# Patient Record
Sex: MALE | Race: WHITE | Employment: OTHER | URBAN - METROPOLITAN AREA
[De-identification: names, ages, dates, MRNs, and addresses within clinical notes are randomized per-mention and may not be internally consistent; named-entity substitution may affect disease eponyms.]

---

## 2020-06-25 ENCOUNTER — OFFICE VISIT (OUTPATIENT)
Dept: PRIMARY CARE CLINIC | Age: 80
End: 2020-06-25

## 2020-06-25 PROCEDURE — 99211 OFF/OP EST MAY X REQ PHY/QHP: CPT | Performed by: NURSE PRACTITIONER

## 2020-06-27 LAB
SARS-COV-2: NOT DETECTED
SOURCE: NORMAL

## 2020-06-30 ENCOUNTER — HOSPITAL ENCOUNTER (OUTPATIENT)
Dept: NON INVASIVE DIAGNOSTICS | Age: 80
Discharge: HOME | End: 2020-06-30
Payer: MEDICARE

## 2020-06-30 ENCOUNTER — HOSPITAL ENCOUNTER (OUTPATIENT)
Dept: PULMONOLOGY | Age: 80
Discharge: HOME | End: 2020-06-30
Payer: MEDICARE

## 2020-06-30 VITALS — OXYGEN SATURATION: 96 %

## 2020-06-30 LAB
LV EF: 53 %
LVEF MODALITY: NORMAL

## 2020-06-30 PROCEDURE — 94760 N-INVAS EAR/PLS OXIMETRY 1: CPT

## 2020-06-30 PROCEDURE — 94664 DEMO&/EVAL PT USE INHALER: CPT

## 2020-06-30 PROCEDURE — 94726 PLETHYSMOGRAPHY LUNG VOLUMES: CPT

## 2020-06-30 PROCEDURE — 94010 BREATHING CAPACITY TEST: CPT

## 2020-06-30 PROCEDURE — 93306 TTE W/DOPPLER COMPLETE: CPT

## 2020-06-30 PROCEDURE — 93356 MYOCRD STRAIN IMG SPCKL TRCK: CPT

## 2020-06-30 PROCEDURE — 94729 DIFFUSING CAPACITY: CPT

## 2020-07-01 NOTE — PROCEDURES
Pulmonary Function Test:     Indication: Diffuse large B cell lymphoma     Test comment:     Spirometry data is acceptable and reproducible. Maximum effort given during the test.    Pulse ox is 96% on room air    There is no height or weight on file to calculate BMI. Spirometry data:    FEV1/FVC: 79. Predicted ratio 71    FEV1 2.79L, which is 94% predicted    FVC is 3.51L, which is 84% predicted    Lung Volumes:    TLC (by Plethysmography) is 6.84L, which is 94% predicted    RV is 3.56L which is 131% predicted    Diffusion Capacity:    DLCO is 28.48 which is 92% predicted    Impression:    1. There is no obstruction present    2. There is no restriction present    3. There is no significant hyperinflation. There is air trapping of unclear significance     4.  There is no reduction in diffusion capacity

## 2020-07-13 PROBLEM — C83.30 DIFFUSE LARGE B CELL LYMPHOMA (HCC): Status: ACTIVE | Noted: 2020-07-13

## 2020-07-15 ENCOUNTER — HOSPITAL ENCOUNTER (OUTPATIENT)
Dept: ONCOLOGY | Age: 80
Setting detail: INFUSION SERIES
Discharge: HOME | End: 2020-07-15
Payer: MEDICARE

## 2020-07-15 VITALS
RESPIRATION RATE: 18 BRPM | DIASTOLIC BLOOD PRESSURE: 75 MMHG | OXYGEN SATURATION: 97 % | HEART RATE: 64 BPM | TEMPERATURE: 98 F | SYSTOLIC BLOOD PRESSURE: 128 MMHG

## 2020-07-15 DIAGNOSIS — C83.30 DIFFUSE LARGE B-CELL LYMPHOMA, UNSPECIFIED BODY REGION (HCC): Primary | ICD-10-CM

## 2020-07-15 LAB
A/G RATIO: 1.5 (ref 1.1–2.2)
ALBUMIN SERPL-MCNC: 4.5 G/DL (ref 3.4–5)
ALP BLD-CCNC: 73 U/L (ref 40–129)
ALT SERPL-CCNC: 18 U/L (ref 10–40)
ANION GAP SERPL CALCULATED.3IONS-SCNC: 10 MMOL/L (ref 3–16)
AST SERPL-CCNC: 23 U/L (ref 15–37)
BASOPHILS ABSOLUTE: 0 K/UL (ref 0–0.2)
BASOPHILS ABSOLUTE: 0 K/UL (ref 0–0.2)
BASOPHILS RELATIVE PERCENT: 0.4 %
BASOPHILS RELATIVE PERCENT: 0.6 %
BILIRUB SERPL-MCNC: 0.4 MG/DL (ref 0–1)
BUN BLDV-MCNC: 20 MG/DL (ref 7–20)
CALCIUM SERPL-MCNC: 9.3 MG/DL (ref 8.3–10.6)
CHLORIDE BLD-SCNC: 104 MMOL/L (ref 99–110)
CO2: 27 MMOL/L (ref 21–32)
CREAT SERPL-MCNC: 1.1 MG/DL (ref 0.8–1.3)
EOSINOPHILS ABSOLUTE: 0 K/UL (ref 0–0.6)
EOSINOPHILS ABSOLUTE: 0.1 K/UL (ref 0–0.6)
EOSINOPHILS RELATIVE PERCENT: 0.5 %
EOSINOPHILS RELATIVE PERCENT: 1 %
GFR AFRICAN AMERICAN: >60
GFR NON-AFRICAN AMERICAN: >60
GLOBULIN: 3 G/DL
GLUCOSE BLD-MCNC: 225 MG/DL (ref 70–99)
HCT VFR BLD CALC: 44 % (ref 40.5–52.5)
HCT VFR BLD CALC: 46.4 % (ref 40.5–52.5)
HEMOGLOBIN: 14.6 G/DL (ref 13.5–17.5)
HEMOGLOBIN: 15.5 G/DL (ref 13.5–17.5)
LYMPHOCYTES ABSOLUTE: 1.4 K/UL (ref 1–5.1)
LYMPHOCYTES ABSOLUTE: 1.6 K/UL (ref 1–5.1)
LYMPHOCYTES RELATIVE PERCENT: 23.6 %
LYMPHOCYTES RELATIVE PERCENT: 26.2 %
MAGNESIUM: 2.1 MG/DL (ref 1.8–2.4)
MCH RBC QN AUTO: 32.9 PG (ref 26–34)
MCH RBC QN AUTO: 33.3 PG (ref 26–34)
MCHC RBC AUTO-ENTMCNC: 33.2 G/DL (ref 31–36)
MCHC RBC AUTO-ENTMCNC: 33.5 G/DL (ref 31–36)
MCV RBC AUTO: 98.9 FL (ref 80–100)
MCV RBC AUTO: 99.5 FL (ref 80–100)
MONOCYTES ABSOLUTE: 0.5 K/UL (ref 0–1.3)
MONOCYTES ABSOLUTE: 0.6 K/UL (ref 0–1.3)
MONOCYTES RELATIVE PERCENT: 10.2 %
MONOCYTES RELATIVE PERCENT: 7.7 %
NEUTROPHILS ABSOLUTE: 3.4 K/UL (ref 1.7–7.7)
NEUTROPHILS ABSOLUTE: 4.6 K/UL (ref 1.7–7.7)
NEUTROPHILS RELATIVE PERCENT: 62.2 %
NEUTROPHILS RELATIVE PERCENT: 67.6 %
PDW BLD-RTO: 13.4 % (ref 12.4–15.4)
PDW BLD-RTO: 13.6 % (ref 12.4–15.4)
PLATELET # BLD: 139 K/UL (ref 135–450)
PLATELET # BLD: 162 K/UL (ref 135–450)
PMV BLD AUTO: 8.8 FL (ref 5–10.5)
PMV BLD AUTO: 9.5 FL (ref 5–10.5)
POTASSIUM SERPL-SCNC: 4.8 MMOL/L (ref 3.5–5.1)
RBC # BLD: 4.44 M/UL (ref 4.2–5.9)
RBC # BLD: 4.66 M/UL (ref 4.2–5.9)
SODIUM BLD-SCNC: 141 MMOL/L (ref 136–145)
TOTAL PROTEIN: 7.5 G/DL (ref 6.4–8.2)
WBC # BLD: 5.4 K/UL (ref 4–11)
WBC # BLD: 6.9 K/UL (ref 4–11)

## 2020-07-15 PROCEDURE — 2580000003 HC RX 258: Performed by: INTERNAL MEDICINE

## 2020-07-15 PROCEDURE — 85025 COMPLETE CBC W/AUTO DIFF WBC: CPT

## 2020-07-15 PROCEDURE — 96366 THER/PROPH/DIAG IV INF ADDON: CPT

## 2020-07-15 PROCEDURE — 6360000002 HC RX W HCPCS: Performed by: INTERNAL MEDICINE

## 2020-07-15 PROCEDURE — 99213 OFFICE O/P EST LOW 20 MIN: CPT

## 2020-07-15 PROCEDURE — 80053 COMPREHEN METABOLIC PANEL: CPT

## 2020-07-15 PROCEDURE — 83735 ASSAY OF MAGNESIUM: CPT

## 2020-07-15 PROCEDURE — 96365 THER/PROPH/DIAG IV INF INIT: CPT

## 2020-07-15 RX ORDER — ASCORBIC ACID 500 MG
500 TABLET ORAL DAILY
COMMUNITY

## 2020-07-15 RX ORDER — M-VIT,TX,IRON,MINS/CALC/FOLIC 27MG-0.4MG
1 TABLET ORAL DAILY
COMMUNITY

## 2020-07-15 RX ADMIN — CALCIUM GLUCONATE 4 G: 98 INJECTION, SOLUTION INTRAVENOUS at 08:17

## 2020-07-15 NOTE — PROGRESS NOTES
Autologous stem cell collection  7/15/2020     Asaf Sanchez    MRN: [id number]    : 1940    Referring MD: Burt Hayes MD  [address] [facility] [address], [address], [address]    S:  SUBJECTIVE:  Patient is doing welI. Dx: recurrent DLBCL    Indication: anticipation T-cell collection for Derian trial CAR-T infusion     Day + 1 of T-cell collection      Medications    Scheduled Meds:   calcium gluconate IVPB  4 g Intravenous Once     Continuous Infusions:  PRN Meds:. ROS  · Constitutional: Denies fever, sweats, weight loss. Minor [place]  · Eyes: No visual changes or diplopia. No scleral icterus. · ENT: No Headaches, hearing loss or vertigo. No mouth sores or sore throat. · Cardiovascular: No chest pain, dyspnea on exertion, palpitations or loss of consciousness. · Respiratory: No cough or wheezing, no sputum production. No hemoptysis. .    · Gastrointestinal: No abdominal pain, appetite loss, blood in stools. No change in bowel habits. · Genitourinary: No dysuria, trouble voiding, or hematuria. · Musculoskeletal:  Generalized weakness. No joint complaints. · Integumentary: No rash or pruritis. · Neurological: No headache, diplopia. No change in gait, balance, or coordination. No paresthesias. · Endocrine: No temperature intolerance. No excessive thirst, fluid intake, or urination. · Hematologic/Lymphatic: No abnormal bruising or ecchymoses, blood clots or swollen lymph nodes. · Allergic/Immunologic: No nasal congestion or hives.      O:   Physical Exam:     I&O:  No intake or output data in the 24 hours ending 07/15/20 0946    Vital Signs:  /75   Pulse 77   Temp 98.1 °F (36.7 °C) (Oral)   Resp 16   SpO2 97%     Weight:    Wt Readings from Last 3 Encounters:   No data found for Wt         General: Awake, alert and oriented.   HEENT: normocephalic, PERRL, no scleral erythema or icterus, Oral mucosa moist and intact, throat clear  NECK: supple without palpable adenopathy  BACK: Straight negative CVAT  SKIN: warm dry and intact without lesions rashes or masses  CHEST: CTA bilaterally without use of accessory muscles  CV: Normal S1 S2, RRR, no MRG  ABD: NT ND normoactive BS, no palpable masses or hepatosplenomegaly  EXTREMITIES: without edema, denies calf tenderness  NEURO: CN II - XII grossly intact  CATHETER: none    Data    CBC:   Recent Labs     07/15/20  0815   WBC 5.4   HGB 15.5   HCT 46.4   MCV 99.5        BMP/Mag:  Recent Labs     07/15/20  0815      K 4.8      CO2 27   BUN 20   CREATININE 1.1   MG 2.10     LIVP:   Recent Labs     07/15/20  0815   AST 23   ALT 18   BILITOT 0.4   ALKPHOS 73     Coags: No results for input(s): PROTIME, INR, APTT in the last 72 hours. Uric Acid No results for input(s): LABURIC in the last 72 hours. Description of procedure:   Pt underwent a 20lt collection at a flow rate of 20mls/min. Pt tolerated the procedure well with no complications. He received Ca gluconate empirically. Access was peripheral IV which is intact at the insertion site. A/P:    DLBCL: Collect lymphocytes for CAR-T 7/15/20.  He  tolerated the lymphocyte collection well today.     - PET week of August 2nd, 2020, prior to lympodepleting chemotherapy     Toxicities: mild weakness and fatigue       Chrissie Schmidt, APRN - NP

## 2020-07-15 NOTE — PROGRESS NOTES
Patient had to come back to outpatient infusion for a CBC w Diff. His post cbc w diff was sent per [facility] RN but lab never received it. Patient was upset that he had to come back. RN verbalized understanding and the importance of that particular lab. Patient and wife discharged ambulatory.

## 2020-07-15 NOTE — PLAN OF CARE
Problem: KNOWLEDGE DEFICIT  Goal: Patient/S.O. demonstrates understanding of disease process, treatment plan, medications, and discharge instructions. Outcome: Met This Shift   Pt arrived to [facility] today for scheduled T cell pheresis procedure. Apheresis, line care, education, & lab draws all completed by Karlee RUGGIERO, Shannon Stallworth. Review aKrlee documentation for details.

## 2020-08-04 ENCOUNTER — HOSPITAL ENCOUNTER (OUTPATIENT)
Dept: CT IMAGING | Age: 80
Discharge: HOME | End: 2020-08-04
Payer: MEDICARE

## 2020-08-04 LAB
GFR AFRICAN AMERICAN: >60
GFR NON-AFRICAN AMERICAN: >60
PERFORMED ON: NORMAL
POC CREATININE: 1.1 MG/DL (ref 0.8–1.3)
POC SAMPLE TYPE: NORMAL

## 2020-08-04 PROCEDURE — 6360000004 HC RX CONTRAST MEDICATION: Performed by: INTERNAL MEDICINE

## 2020-08-04 PROCEDURE — 82565 ASSAY OF CREATININE: CPT

## 2020-08-04 PROCEDURE — 70491 CT SOFT TISSUE NECK W/DYE: CPT

## 2020-08-04 PROCEDURE — 71260 CT THORAX DX C+: CPT

## 2020-08-04 RX ADMIN — IOPAMIDOL 80 ML: 755 INJECTION, SOLUTION INTRAVENOUS at 09:04

## 2020-08-04 RX ADMIN — IOHEXOL 50 ML: 240 INJECTION, SOLUTION INTRATHECAL; INTRAVASCULAR; INTRAVENOUS; ORAL at 09:05

## 2020-08-18 ENCOUNTER — HOSPITAL ENCOUNTER (INPATIENT)
Age: 80
LOS: 7 days | Discharge: HOME | DRG: 552 | End: 2020-08-25
Attending: INTERNAL MEDICINE | Admitting: INTERNAL MEDICINE
Payer: MEDICARE

## 2020-08-18 ENCOUNTER — APPOINTMENT (OUTPATIENT)
Dept: GENERAL RADIOLOGY | Age: 80
DRG: 552 | End: 2020-08-18
Attending: INTERNAL MEDICINE
Payer: MEDICARE

## 2020-08-18 PROBLEM — R29.90 NEUROTOXICITY: Status: ACTIVE | Noted: 2020-08-18

## 2020-08-18 LAB
ALBUMIN SERPL-MCNC: 4.1 G/DL (ref 3.4–5)
ANION GAP SERPL CALCULATED.3IONS-SCNC: 11 MMOL/L (ref 3–16)
BILIRUBIN URINE: NEGATIVE
BLOOD, URINE: NEGATIVE
BUN BLDV-MCNC: 19 MG/DL (ref 7–20)
CALCIUM SERPL-MCNC: 8.7 MG/DL (ref 8.3–10.6)
CHLORIDE BLD-SCNC: 98 MMOL/L (ref 99–110)
CLARITY: CLEAR
CO2: 25 MMOL/L (ref 21–32)
COLOR: YELLOW
CREAT SERPL-MCNC: 1 MG/DL (ref 0.8–1.3)
GFR AFRICAN AMERICAN: >60
GFR NON-AFRICAN AMERICAN: >60
GLUCOSE BLD-MCNC: 229 MG/DL (ref 70–99)
GLUCOSE URINE: NEGATIVE MG/DL
KETONES, URINE: ABNORMAL MG/DL
LACTATE DEHYDROGENASE: 185 U/L (ref 100–190)
LACTIC ACID: 1.8 MMOL/L (ref 0.4–2)
LEUKOCYTE ESTERASE, URINE: NEGATIVE
MICROSCOPIC EXAMINATION: ABNORMAL
NITRITE, URINE: NEGATIVE
PH UA: 6 (ref 5–8)
PHOSPHORUS: 2.5 MG/DL (ref 2.5–4.9)
PHOSPHORUS: 2.8 MG/DL (ref 2.5–4.9)
POTASSIUM SERPL-SCNC: 4.1 MMOL/L (ref 3.5–5.1)
PROTEIN UA: NEGATIVE MG/DL
SODIUM BLD-SCNC: 134 MMOL/L (ref 136–145)
SPECIFIC GRAVITY UA: 1.02 (ref 1–1.03)
URIC ACID, SERUM: 4.6 MG/DL (ref 3.5–7.2)
URINE TYPE: ABNORMAL
UROBILINOGEN, URINE: 0.2 E.U./DL

## 2020-08-18 PROCEDURE — 87103 BLOOD FUNGUS CULTURE: CPT

## 2020-08-18 PROCEDURE — 87040 BLOOD CULTURE FOR BACTERIA: CPT

## 2020-08-18 PROCEDURE — 80069 RENAL FUNCTION PANEL: CPT

## 2020-08-18 PROCEDURE — 6360000002 HC RX W HCPCS: Performed by: INTERNAL MEDICINE

## 2020-08-18 PROCEDURE — 6370000000 HC RX 637 (ALT 250 FOR IP): Performed by: INTERNAL MEDICINE

## 2020-08-18 PROCEDURE — 83605 ASSAY OF LACTIC ACID: CPT

## 2020-08-18 PROCEDURE — 36591 DRAW BLOOD OFF VENOUS DEVICE: CPT

## 2020-08-18 PROCEDURE — 6370000000 HC RX 637 (ALT 250 FOR IP): Performed by: PHYSICIAN ASSISTANT

## 2020-08-18 PROCEDURE — 2580000003 HC RX 258: Performed by: INTERNAL MEDICINE

## 2020-08-18 PROCEDURE — 84100 ASSAY OF PHOSPHORUS: CPT

## 2020-08-18 PROCEDURE — 84550 ASSAY OF BLOOD/URIC ACID: CPT

## 2020-08-18 PROCEDURE — 94150 VITAL CAPACITY TEST: CPT

## 2020-08-18 PROCEDURE — 81003 URINALYSIS AUTO W/O SCOPE: CPT

## 2020-08-18 PROCEDURE — 71046 X-RAY EXAM CHEST 2 VIEWS: CPT

## 2020-08-18 PROCEDURE — 93005 ELECTROCARDIOGRAM TRACING: CPT | Performed by: PHYSICIAN ASSISTANT

## 2020-08-18 PROCEDURE — 83615 LACTATE (LD) (LDH) ENZYME: CPT

## 2020-08-18 PROCEDURE — 2060000000 HC ICU INTERMEDIATE R&B

## 2020-08-18 PROCEDURE — 51798 US URINE CAPACITY MEASURE: CPT

## 2020-08-18 RX ORDER — ACETAMINOPHEN 325 MG/1
650 TABLET ORAL EVERY 4 HOURS PRN
Status: DISCONTINUED | OUTPATIENT
Start: 2020-08-18 | End: 2020-08-25 | Stop reason: HOSPADM

## 2020-08-18 RX ORDER — LEVETIRACETAM 500 MG/1
500 TABLET ORAL 2 TIMES DAILY
Status: DISCONTINUED | OUTPATIENT
Start: 2020-08-18 | End: 2020-08-25 | Stop reason: HOSPADM

## 2020-08-18 RX ORDER — ONDANSETRON HYDROCHLORIDE 8 MG/1
8 TABLET, FILM COATED ORAL EVERY 8 HOURS PRN
Status: DISCONTINUED | OUTPATIENT
Start: 2020-08-18 | End: 2020-08-25 | Stop reason: HOSPADM

## 2020-08-18 RX ORDER — ONDANSETRON HYDROCHLORIDE 8 MG/1
8 TABLET, FILM COATED ORAL EVERY 8 HOURS PRN
COMMUNITY
End: 2021-05-06

## 2020-08-18 RX ORDER — SODIUM CHLORIDE 0.9 % (FLUSH) 0.9 %
10 SYRINGE (ML) INJECTION EVERY 12 HOURS SCHEDULED
Status: DISCONTINUED | OUTPATIENT
Start: 2020-08-18 | End: 2020-08-25 | Stop reason: HOSPADM

## 2020-08-18 RX ORDER — ASCORBIC ACID 500 MG
500 TABLET ORAL DAILY
Status: DISCONTINUED | OUTPATIENT
Start: 2020-08-18 | End: 2020-08-25 | Stop reason: HOSPADM

## 2020-08-18 RX ORDER — SODIUM CHLORIDE 0.9 % (FLUSH) 0.9 %
10 SYRINGE (ML) INJECTION PRN
Status: DISCONTINUED | OUTPATIENT
Start: 2020-08-18 | End: 2020-08-25 | Stop reason: HOSPADM

## 2020-08-18 RX ORDER — POTASSIUM CHLORIDE 29.8 MG/ML
20 INJECTION INTRAVENOUS CONTINUOUS PRN
Status: DISCONTINUED | OUTPATIENT
Start: 2020-08-18 | End: 2020-08-25 | Stop reason: HOSPADM

## 2020-08-18 RX ORDER — M-VIT,TX,IRON,MINS/CALC/FOLIC 27MG-0.4MG
1 TABLET ORAL DAILY
Status: DISCONTINUED | OUTPATIENT
Start: 2020-08-18 | End: 2020-08-25 | Stop reason: HOSPADM

## 2020-08-18 RX ORDER — LEVETIRACETAM 500 MG/1
500 TABLET ORAL 2 TIMES DAILY
COMMUNITY
End: 2021-05-06

## 2020-08-18 RX ORDER — SODIUM CHLORIDE 9 MG/ML
500 INJECTION, SOLUTION INTRAVENOUS CONTINUOUS PRN
Status: DISCONTINUED | OUTPATIENT
Start: 2020-08-18 | End: 2020-08-25 | Stop reason: HOSPADM

## 2020-08-18 RX ORDER — TRAMADOL HYDROCHLORIDE 50 MG/1
50 TABLET ORAL EVERY 6 HOURS PRN
Status: DISCONTINUED | OUTPATIENT
Start: 2020-08-18 | End: 2020-08-25 | Stop reason: HOSPADM

## 2020-08-18 RX ORDER — MAGNESIUM SULFATE IN WATER 40 MG/ML
4 INJECTION, SOLUTION INTRAVENOUS PRN
Status: DISCONTINUED | OUTPATIENT
Start: 2020-08-18 | End: 2020-08-25 | Stop reason: HOSPADM

## 2020-08-18 RX ADMIN — PIPERACILLIN SODIUM AND TAZOBACTAM SODIUM 4.5 G: 4; .5 INJECTION, POWDER, LYOPHILIZED, FOR SOLUTION INTRAVENOUS at 21:03

## 2020-08-18 RX ADMIN — MULTIPLE VITAMINS W/ MINERALS TAB 1 TABLET: TAB at 18:05

## 2020-08-18 RX ADMIN — LEVETIRACETAM 500 MG: 500 TABLET, FILM COATED ORAL at 21:02

## 2020-08-18 RX ADMIN — Medication 500 MG: at 18:50

## 2020-08-18 RX ADMIN — TRAMADOL HYDROCHLORIDE 50 MG: 50 TABLET, FILM COATED ORAL at 23:45

## 2020-08-18 RX ADMIN — SODIUM CHLORIDE 15 ML: 900 IRRIGANT IRRIGATION at 17:03

## 2020-08-18 RX ADMIN — ACETAMINOPHEN 650 MG: 325 TABLET ORAL at 21:02

## 2020-08-18 ASSESSMENT — PAIN - FUNCTIONAL ASSESSMENT
PAIN_FUNCTIONAL_ASSESSMENT: PREVENTS OR INTERFERES SOME ACTIVE ACTIVITIES AND ADLS
PAIN_FUNCTIONAL_ASSESSMENT: PREVENTS OR INTERFERES SOME ACTIVE ACTIVITIES AND ADLS

## 2020-08-18 ASSESSMENT — PAIN DESCRIPTION - DESCRIPTORS
DESCRIPTORS: CRAMPING;DISCOMFORT
DESCRIPTORS: CRAMPING;DISCOMFORT

## 2020-08-18 ASSESSMENT — PAIN DESCRIPTION - LOCATION
LOCATION: LEG;HIP
LOCATION: LEG;HIP

## 2020-08-18 ASSESSMENT — PAIN DESCRIPTION - ONSET
ONSET: ON-GOING
ONSET: ON-GOING

## 2020-08-18 ASSESSMENT — PAIN SCALES - GENERAL
PAINLEVEL_OUTOF10: 4
PAINLEVEL_OUTOF10: 0
PAINLEVEL_OUTOF10: 3

## 2020-08-18 ASSESSMENT — PAIN DESCRIPTION - ORIENTATION
ORIENTATION: RIGHT;LEFT
ORIENTATION: RIGHT;LEFT

## 2020-08-18 ASSESSMENT — PAIN DESCRIPTION - PAIN TYPE
TYPE: ACUTE PAIN
TYPE: ACUTE PAIN

## 2020-08-18 ASSESSMENT — PAIN DESCRIPTION - FREQUENCY
FREQUENCY: CONTINUOUS
FREQUENCY: CONTINUOUS

## 2020-08-18 ASSESSMENT — PAIN DESCRIPTION - PROGRESSION
CLINICAL_PROGRESSION: NOT CHANGED
CLINICAL_PROGRESSION: NOT CHANGED

## 2020-08-18 NOTE — PROGRESS NOTES
4 Eyes Admission Assessment     I agree as the admission nurse that 2 RN's have performed a thorough Head to Toe Skin Assessment on the patient. ALL assessment sites listed below have been assessed on admission. Areas assessed by both nurses: theodore and Anika   [x]   Head, Face, and Ears   [x]   Shoulders, Back, and Chest  [x]   Arms, Elbows, and Hands   [x]   Coccyx, Sacrum, and Ischum  [x]   Legs, Feet, and Heels        Does the Patient have Skin Breakdown?   No         Sushant Prevention initiated:  No   Wound Care Orders initiated:  No      [facility] nurse consulted for Pressure Injury (Stage 3,4, Unstageable, DTI, NWPT, and Complex wounds) or Sushant score 18 or lower:  No      Nurse 1 eSignature: Electronically signed by Kimberly Damon RN on 8/18/20 at 4:15 PM EDT    **SHARE this note so that the co-signing nurse is able to place an eSignature**    Nurse 2 eSignature: Electronically signed by Aniyah Castillo RN on 8/18/20 at 7:28 PM EDT

## 2020-08-18 NOTE — PROGRESS NOTES
Patient admitted to [facility] via wheelchair from [facility]. Patient and wife oriented to patient room including call light and bed controls. Admission assessment completed - see admission flowsheet documentation. Patient is high fall risk. Safety measures instituted per policy. Patient and wife oriented to unit policies and procedures including: pain management practices, unit safety precautions, family rapid response, q4h vital signs and assessments, daily 4am lab draws, weekly chest x-rays, weekly VRE rectal swabs for surveillance, daily chlorhexidine bathing, standing transfusion orders, and routine central line care. Also discussed use of call light and how to get in touch with nursing staff. Stressed the importance of calling out immediately for any changes in condition including but not limited to: pain, chills, fever, nausea, vomiting, diarrhea, chest pain, sob/olsen, assistance with toileting, bleeding, or any other symptoms that are out of the ordinary for the patient. Patient verbalizes understanding of all instructions and will call for assistance as needed.

## 2020-08-18 NOTE — H&P
[facility] History and Physical        Attending Physician: Wagner Allison MD    Primary Care: Sweta Martins MD       Referring MD: Wagner Allison MD  [address] [facility] [address]  [place], [address]    Name: Grady Johnson :  1940  MRN:  [id number]    Admission: (Not on file)      Date: 2020    Reason for Admission:  Neurotoxicity     History of Present Illness:     Mr. 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 With retroperitoneal lymph node biopsy showed myc partially positive; BCL-2 strongly positive. A high-67 proliferation index was 90%. 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 He was treated with R-CHOP x6 (1/10/2019- 2019). His PET scan after 4 cycles showed complete response. A follow-up PET scan (2020) showed 2 new zones of elevated uptake involving the right inguinal node and soft tissue thickening in the right retroperitoneum. He underwent a repeat biopsy of the inguinal lymph node. This again documented diffuse large cell B-cell non-Hodgkin's lymphoma germinal center type. There was translocation of chromosome 14; 18. He was then consented for [address]. He underwent lymphodepleting chemotherapy with Fludarabine and cyclophosphamide on 8/10-20 and had his CAR-T infusion on 20. He was returning daily for close follow up. He presented to [facility] in the morning on 20 in his usual state of health. He had walked in and out of the office without issue. He then went home and was eating lunch in a chair when he had sudden onset numbness in bilateral lower extremities. He was able to ambulate into the car but reported increased weakness. He denies loss of bowel or bladder function but states that he does not feel that he has to urinate. He denies fevers, chills, confusion, back pain. Given his sudden onset neurologic changes, he will be admitted for further work up and close monitoring. Neurology will be consulted on admission. He will also have MRIs of the lumbar spine ordered, as well as an LP after washout of his anticoagulant to further evaluate. No past surgical history on file. No past medical history on file. Prior to Admission medications    Medication Sig Start Date End Date Taking? Authorizing Provider   apixaban (ELIQUIS) 2.5 MG TABS tablet Take 2.5 mg by mouth 2 times daily    Historical Provider, MD   Multiple Vitamins-Minerals (THERAPEUTIC MULTIVITAMIN-MINERALS) tablet Take 1 tablet by mouth daily    Historical Provider, MD   vitamin C (ASCORBIC ACID) 500 MG tablet Take 500 mg by mouth daily    Historical Provider, MD       No Known Allergies    No family history on file.      Social History     Socioeconomic History    Marital status:      Spouse name: Not on file    Number of children: Not on file    Years of education: Not on file    Highest education level: Not on file   Occupational History    Not on file   Social Needs    Financial resource strain: Not on file    Food insecurity     Worry: Not on file     Inability: Not on file    Transportation needs     Medical: Not on file     Non-medical: Not on file   Tobacco Use    Smoking status: Not on file   Substance and Sexual Activity    Alcohol use: Not on file    Drug use: Not on file    Sexual activity: Not on file   Lifestyle    Physical activity     Days per week: Not on file     Minutes per session: Not on file    Stress: Not on file   Relationships    Social connections     Talks on phone: Not on file     Gets together: Not on file     Attends Congregation service: Not on file     Active member of club or organization: Not on file     Attends meetings of clubs or organizations: Not on file     Relationship status: Not on file    Intimate partner violence     Fear of current or ex partner: Not on file     Emotionally abused: Not on file     Physically abused: Not on file     Forced sexual activity: Not on file   Other Topics Concern    Not on file   Social History Narrative    Not on file        ROS:  As noted above, otherwise remainder of 10-point ROS negative      Physical Exam:     Vital Signs: There were no vitals taken for this visit. Weight:    Wt Readings from Last 3 Encounters:   No data found for Wt       KPS: 50%  Requires considerable assistance and frequent medical care    ECOG PS:  (3) Capable of limited self-care, confined to bed or chair > 50% of waking hours    General: Awake, alert and oriented. HEENT: normocephalic, alopecia, PERRL, no scleral erythema or icterus, Oral mucosa moist and intact, throat clear  NECK: supple without palpable adenopathy  BACK: Straight negative CVAT  SKIN: warm dry and intact without lesions rashes or masses  CHEST: CTA bilaterally without use of accessory muscles  CV: Normal S1 S2, RRR, no MRG  ABD: NT ND normoactive BS, no palpable masses or hepatosplenomegaly  EXTREMITIES: without edema, denies calf tenderness  NEURO: CN II - XII grossly intact; strength 4/5 in BLE except 5/5 plantar and dorsiflexioin at the ankles, sensation in the LE intact, finger to dose intact as well as upper body strength. Walks with unsteady wide based gait which is new. CATHETER: Right chest port     Laboratory Data:  CBC: No results for input(s): WBC, HGB, HCT, MCV, PLT in the last 72 hours. BMP/Mag:No results for input(s): NA, K, CL, CO2, PHOS, BUN, CREATININE, MG in the last 72 hours. Invalid input(s): CA  LIVP: No results for input(s): AST, ALT, LIPASE, BILIDIR, BILITOT, ALKPHOS in the last 72 hours. Invalid input(s): AMYLASE,  ALB  Coags: No results for input(s): PROTIME, INR, APTT in the last 72 hours. Uric Acid No results for input(s): LABURIC in the last 72 hours.   No results found for: CRP  No results found for: FERRITIN    PROBLEM LIST:             1. Diffuse Large B-Cell Lymphoma  2. History of Pulmonary Embolism  3. Neurotoxicity       TREATMENT:             1. R-CHOP x 6 cycles (1/10/2019-4/24/2019)  2. USOR 57478 - lymphodepleting chemotherapy:  Fludarabine and cyclophosphamide (8/10/2020-8/12/2020) f/b CAR-T 8/14/2020      ASSESSMENT AND PLAN:          1. Recurrent NHL  - PET/CT (6/30/20): right paraortic mass, SUV 5.2.   - Plan: lympho-depleting chemo (Fludarabine/Cyclophosphamide, 8/10-8/12/20) followed by CAR-T infusion (8/14/20) per clinical trial.      Day +4 of CAR-T infusion     Ferritin 210  C-RP 0.9    Lymphodepleting Chemotherapy: Fludarabine and cyclophosphamide  Disease Status at time of Infusion: Relapsed  CAR-T Infusion Date: 8/14/20  CAR-T Product:  Karin Handler ([id number])    2. CRS/Neuro:   - Cont Keppra 500mg BID (start 8/10/20)   - Consult neurology on admission  - MRI L/S spine (8/18/20): pending  - LP (8/18/20): ordered, will need to be delayed until 8/20/20 d/t taking Eliquis 8/18/20    Toxicity Grading   CRS stgstrstastdstest:st st1st ICANs thgthrthathdtheth:th th5th ICE score: 10      3. ID: Afebrile    - Start prophylaxis antimicrobials when ANC <1.5.     4. Heme: anemia from chemotherapy  - Transfuse for Hgb < 7 and Platelets < 28K  - No transfusion today     5. Metabolic: Electrolytes & renal fxn stable  - Encourage PO fluid intake    6. GI / Nutrition: Appetite and oral intake is good  - Cont low microbial diet  - Zofran and Compazine as needed     7. Hx of PE  - Lower dose to 2.5mg BID and hold if platelets < 94L -- HOLD in anticipation of LP      - DVT Prophylaxis: Prophylaxis on hold d/t contraindication  Contraindications to pharmacologic prophylaxis: Patient already on therapeutic anticoagulation  Contraindications to mechanical prophylaxis: None    - Disposition: Once resolution in neurologic symptoms    The patient was seen and examined by Dr. Poli Nichole. This admission history and physical has been discussed and agreed upon by Dr. Poli Nichole.     Teresa Urbina DEX Mcclodu MD  [facility]  Please contact me through [address]

## 2020-08-18 NOTE — CONSULTS
Neurology Consult Note    Reason for Consult: numbness     Chief complaint: numbness     Dr Maynor Hickman MD asked me to see Tasneem Morocho in consultation for evaluation of numbness. History of Present Illness:  Tasneem Morocho is a [de-identified] y.o. male who presents with numbness  Location: both legs  Quality: numb and tingling  Onset: abruptly  Course: improved by about 50%  Frequency: once  Duration: about 4 hours  Modifying factors: none, but symptoms have been improving on their own since onset  Associated symptoms: initially perceived weakness in both legs, now strength reported as normal.    Sneha Caballero was having lunch and abruptly after lunch developed weakness and reduced sensation and paresthesias in both legs. He admits to reduced urinary urgency since chemotherapy began weeks ago, and denies any abrupt change in this today. He admits to chronic gradient stocking reduction in sensation in both feet up to the ankles bilaterally for \"years. \" He denies any headaches, neck, or back pain. He denies any change in vision or hearing. He denies any dysphagia. He denies urinary or bowel incontinence. Medical History:  No past medical history on file. No past surgical history on file. Medications Prior to Admission: levETIRAcetam (KEPPRA) 500 MG tablet, Take 500 mg by mouth 2 times daily  ondansetron (ZOFRAN) 8 MG tablet, Take 8 mg by mouth every 8 hours as needed for Nausea or Vomiting  apixaban (ELIQUIS) 2.5 MG TABS tablet, Take 2.5 mg by mouth 2 times daily  Multiple Vitamins-Minerals (THERAPEUTIC MULTIVITAMIN-MINERALS) tablet, Take 1 tablet by mouth daily  vitamin C (ASCORBIC ACID) 500 MG tablet, Take 500 mg by mouth daily  No Known Allergies  No family history on file.   Social History     Tobacco Use   Smoking Status Not on file     Social History     Substance and Sexual Activity   Drug Use Not on file     Social History     Substance and Sexual Activity   Alcohol Use Not on file ROS:  Constitutional- No weight loss. No fevers. Eyes- No diplopia. No photophobia. Ears/nose/throat- No dysphagia. No Dysarthria  Cardiovascular- No palpitations. No chest pain  Respiratory- No dyspnea. No Cough  Gastrointestinal- No Abdominal pain. No Vomiting. Genitourinary- No incontinence. No urinary retention  Musculoskeletal- No myalgia. No arthralgia  Skin- No rash. No easy bruising. Psychiatric- No depression. No anxiety  Endocrine- No diabetes. No thyroid issues. Hematologic- No bleeding difficulty. No fatigue  Neurologic- +weakness. No Headache. Exam:  Vitals:    08/18/20 1512   BP: 132/80   Pulse: 79   Resp: 18   SpO2: 97%     Constitutional   Vital signs: BP, HR, and RR reviewed   General Alert, no distress, well-nourished  Eyes: fundoscopic exam unable to visualize fundi  Cardiovascular: pulses symmetric in all 4 extremities. No peripheral edema. Psychiatric: cooperative with examination, no psychotic behavior noted. Neurologic  Mental status:  orientation to person and place  General fund of knowledge grossly intact  Memory grossly intact  Attention intact as able to attend well to the exam   Language fluent in conversation, no dysarthria  Comprehension intact; follows simple commands     Cranial nerves:  CN2: Visual Fields full w/o extinction on confrontational testing  CN 3,4,6: extraocular muscles intact  CN5: facial sensation symmetric  CN7:face symmetric  CN8: hearing grossly intact  CN9: palate elevated symmetrically  CN11: trap full strength on shoulder shrug  CN12: tongue midline with protrusion     Strength: No prontator drift. 5/5 strength in bilateral upper and lower extremities. Deep tendon reflexes: 2/4 in all 4 extremities     Sensory: light touch is reduced in bilateral lower extremities, intact in U.E. bilat. Cerebellar/coordination: finger nose finger normal without ataxia.     Tone: normal in all 4 extremities     Gait: gait was deferred for safety    I reviewed the following laboratory and imaging study reports, and I independently reviewed the following imaging studies. Labs  No results found for this or any previous visit (from the past 36 hour(s)). Studies:  XR CHEST (2 VW)    (Results Pending)   MRI LUMBAR SPINE W WO CONTRAST    (Results Pending)   MRI SACRUM COCCYX W WO CONTRAST    (Results Pending)   FL LUMBAR PUNCTURE DIAG    (Results Pending)       Impression:  1. Lower extremity numbness  2. Paresthesias  3. Lower extremity weakness  4. Reduced urinary urgency  5. Cauda equina syndrome  6. Myelo-radiculoneuropathy, possible  7. Non-hodgkin's lymphoma  8. Neurotoxicity, possible with CAR-T therapy  9. History of bilateral femoral DVT    Loki Linder is a [de-identified] y.o. male who presented with acute onset bilateral lower extremity numbness and paresthesias slowly improving, and with perceived weakness (now resolved) and chronically reduced urinary urgency (since onset of CAR-T therapy) collectively concerning for neurologic or vascular etiology. Symptoms concerning for Cauda equina syndrome, however neurotoxicity from CAR-T well within differential, as well as worsening of known bilateral femoral DVT a consideration. Recommendation:  1. Obtain MRI T/L spine w/wo to evaluate for myelopathy or cauda equina compression; we discussed risks and benefits of dona usage. 2. If any lesion found on MRI imaging, recommend team obtain stat neurosurgery consultation. 3. Obtain vascular medicine/surgery consultation given history of femoral DVTs. 4. Obtain inflammatory workup including TSH, vitamin B12, thiamine, folate, methylmalonic acid, VDRL, HIV, and serum paraneoplastic ([facility]) panel. 5. If above workup normal would advise EMG of both legs when able. 6. The patient should follow up with Neurology as an outpatient after discharge. Your medical management. Thank you for allowing my participation in Mike's care. Please call if any questions or concerns. Ilsa Mariano D.O. [address] [place] [address] [address] Neuroscience  Ph: [phone]    Total face to face time spent performing history, physical examination, and counseling was at least 80 minutes with 50% of time spent counseling the patient and family.     A copy of this note was provided for the attending: Megan Mhoan MD .

## 2020-08-19 ENCOUNTER — APPOINTMENT (OUTPATIENT)
Dept: MRI IMAGING | Age: 80
DRG: 552 | End: 2020-08-19
Attending: INTERNAL MEDICINE
Payer: MEDICARE

## 2020-08-19 ENCOUNTER — APPOINTMENT (OUTPATIENT)
Dept: GENERAL RADIOLOGY | Age: 80
DRG: 552 | End: 2020-08-19
Attending: INTERNAL MEDICINE
Payer: MEDICARE

## 2020-08-19 LAB
ALBUMIN SERPL-MCNC: 4.1 G/DL (ref 3.4–5)
ALP BLD-CCNC: 72 U/L (ref 40–129)
ALT SERPL-CCNC: 19 U/L (ref 10–40)
ANION GAP SERPL CALCULATED.3IONS-SCNC: 12 MMOL/L (ref 3–16)
ANISOCYTOSIS: ABNORMAL
APTT: 30.4 SEC (ref 24.2–36.2)
AST SERPL-CCNC: 20 U/L (ref 15–37)
BACTERIA: ABNORMAL /HPF
BASOPHILS ABSOLUTE: 0 K/UL (ref 0–0.2)
BASOPHILS RELATIVE PERCENT: 0 %
BILIRUB SERPL-MCNC: 0.6 MG/DL (ref 0–1)
BILIRUBIN DIRECT: <0.2 MG/DL (ref 0–0.3)
BILIRUBIN URINE: NEGATIVE
BILIRUBIN, INDIRECT: NORMAL MG/DL (ref 0–1)
BLOOD, URINE: NEGATIVE
BUN BLDV-MCNC: 19 MG/DL (ref 7–20)
C-REACTIVE PROTEIN: 7 MG/L (ref 0–5.1)
CALCIUM SERPL-MCNC: 8.8 MG/DL (ref 8.3–10.6)
CHLORIDE BLD-SCNC: 98 MMOL/L (ref 99–110)
CLARITY: CLEAR
CO2: 25 MMOL/L (ref 21–32)
COLOR: YELLOW
CREAT SERPL-MCNC: 1.1 MG/DL (ref 0.8–1.3)
CRYSTALS, UA: ABNORMAL /HPF
EKG ATRIAL RATE: 79 BPM
EKG DIAGNOSIS: NORMAL
EKG P AXIS: 35 DEGREES
EKG P-R INTERVAL: 138 MS
EKG Q-T INTERVAL: 388 MS
EKG QRS DURATION: 104 MS
EKG QTC CALCULATION (BAZETT): 444 MS
EKG R AXIS: -25 DEGREES
EKG T AXIS: -17 DEGREES
EKG VENTRICULAR RATE: 79 BPM
EOSINOPHILS ABSOLUTE: 0.1 K/UL (ref 0–0.6)
EOSINOPHILS RELATIVE PERCENT: 2 %
FERRITIN: 249.7 NG/ML (ref 30–400)
FOLATE: >20 NG/ML (ref 4.78–24.2)
GFR AFRICAN AMERICAN: >60
GFR NON-AFRICAN AMERICAN: >60
GLUCOSE BLD-MCNC: 154 MG/DL (ref 70–99)
GLUCOSE BLD-MCNC: 204 MG/DL (ref 70–99)
GLUCOSE BLD-MCNC: 215 MG/DL (ref 70–99)
GLUCOSE BLD-MCNC: 223 MG/DL (ref 70–99)
GLUCOSE BLD-MCNC: 235 MG/DL (ref 70–99)
GLUCOSE URINE: 250 MG/DL
HCT VFR BLD CALC: 36.7 % (ref 40.5–52.5)
HEMOGLOBIN: 12.6 G/DL (ref 13.5–17.5)
INR BLD: 0.99 (ref 0.86–1.14)
KETONES, URINE: NEGATIVE MG/DL
LACTATE DEHYDROGENASE: 188 U/L (ref 100–190)
LEUKOCYTE ESTERASE, URINE: NEGATIVE
LYMPHOCYTES ABSOLUTE: 0.2 K/UL (ref 1–5.1)
LYMPHOCYTES RELATIVE PERCENT: 5 %
MAGNESIUM: 1.9 MG/DL (ref 1.8–2.4)
MCH RBC QN AUTO: 33.7 PG (ref 26–34)
MCHC RBC AUTO-ENTMCNC: 34.2 G/DL (ref 31–36)
MCV RBC AUTO: 98.6 FL (ref 80–100)
MICROSCOPIC EXAMINATION: YES
MONOCYTES ABSOLUTE: 0.2 K/UL (ref 0–1.3)
MONOCYTES RELATIVE PERCENT: 5 %
NEUTROPHILS ABSOLUTE: 2.8 K/UL (ref 1.7–7.7)
NEUTROPHILS RELATIVE PERCENT: 88 %
NITRITE, URINE: NEGATIVE
PDW BLD-RTO: 13 % (ref 12.4–15.4)
PERFORMED ON: ABNORMAL
PH UA: 5.5 (ref 5–8)
PHOSPHORUS: 3.2 MG/DL (ref 2.5–4.9)
PLATELET # BLD: 135 K/UL (ref 135–450)
PMV BLD AUTO: 8.8 FL (ref 5–10.5)
POTASSIUM SERPL-SCNC: 4.3 MMOL/L (ref 3.5–5.1)
PROTEIN UA: ABNORMAL MG/DL
PROTHROMBIN TIME: 11.5 SEC (ref 10–13.2)
RBC # BLD: 3.72 M/UL (ref 4.2–5.9)
RBC UA: ABNORMAL /HPF (ref 0–4)
SODIUM BLD-SCNC: 135 MMOL/L (ref 136–145)
SPECIFIC GRAVITY UA: >=1.03 (ref 1–1.03)
TOTAL CK: 56 U/L (ref 39–308)
TOTAL PROTEIN: 6.7 G/DL (ref 6.4–8.2)
TOXIC GRANULATION: PRESENT
TSH REFLEX: 1.59 UIU/ML (ref 0.27–4.2)
URIC ACID, SERUM: 3.7 MG/DL (ref 3.5–7.2)
URINE TYPE: ABNORMAL
UROBILINOGEN, URINE: 0.2 E.U./DL
VITAMIN B-12: 357 PG/ML (ref 211–911)
WBC # BLD: 3.2 K/UL (ref 4–11)
WBC UA: ABNORMAL /HPF (ref 0–5)

## 2020-08-19 PROCEDURE — 82728 ASSAY OF FERRITIN: CPT

## 2020-08-19 PROCEDURE — 84100 ASSAY OF PHOSPHORUS: CPT

## 2020-08-19 PROCEDURE — 83036 HEMOGLOBIN GLYCOSYLATED A1C: CPT

## 2020-08-19 PROCEDURE — 83921 ORGANIC ACID SINGLE QUANT: CPT

## 2020-08-19 PROCEDURE — 84425 ASSAY OF VITAMIN B-1: CPT

## 2020-08-19 PROCEDURE — 81001 URINALYSIS AUTO W/SCOPE: CPT

## 2020-08-19 PROCEDURE — 87102 FUNGUS ISOLATION CULTURE: CPT

## 2020-08-19 PROCEDURE — 83735 ASSAY OF MAGNESIUM: CPT

## 2020-08-19 PROCEDURE — 2060000000 HC ICU INTERMEDIATE R&B

## 2020-08-19 PROCEDURE — 85025 COMPLETE CBC W/AUTO DIFF WBC: CPT

## 2020-08-19 PROCEDURE — 83615 LACTATE (LD) (LDH) ENZYME: CPT

## 2020-08-19 PROCEDURE — 6360000002 HC RX W HCPCS: Performed by: INTERNAL MEDICINE

## 2020-08-19 PROCEDURE — 87070 CULTURE OTHR SPECIMN AEROBIC: CPT

## 2020-08-19 PROCEDURE — 2580000003 HC RX 258: Performed by: PHYSICIAN ASSISTANT

## 2020-08-19 PROCEDURE — A9579 GAD-BASE MR CONTRAST NOS,1ML: HCPCS | Performed by: PSYCHIATRY & NEUROLOGY

## 2020-08-19 PROCEDURE — 85730 THROMBOPLASTIN TIME PARTIAL: CPT

## 2020-08-19 PROCEDURE — 87253 VIRUS INOCULATE TISSUE ADDL: CPT

## 2020-08-19 PROCEDURE — 80076 HEPATIC FUNCTION PANEL: CPT

## 2020-08-19 PROCEDURE — 93010 ELECTROCARDIOGRAM REPORT: CPT | Performed by: INTERNAL MEDICINE

## 2020-08-19 PROCEDURE — 36415 COLL VENOUS BLD VENIPUNCTURE: CPT

## 2020-08-19 PROCEDURE — 72157 MRI CHEST SPINE W/O & W/DYE: CPT

## 2020-08-19 PROCEDURE — 87252 VIRUS INOCULATION TISSUE: CPT

## 2020-08-19 PROCEDURE — 2580000003 HC RX 258: Performed by: INTERNAL MEDICINE

## 2020-08-19 PROCEDURE — 6370000000 HC RX 637 (ALT 250 FOR IP): Performed by: PHYSICIAN ASSISTANT

## 2020-08-19 PROCEDURE — 84550 ASSAY OF BLOOD/URIC ACID: CPT

## 2020-08-19 PROCEDURE — 86140 C-REACTIVE PROTEIN: CPT

## 2020-08-19 PROCEDURE — 85610 PROTHROMBIN TIME: CPT

## 2020-08-19 PROCEDURE — 6360000004 HC RX CONTRAST MEDICATION: Performed by: PSYCHIATRY & NEUROLOGY

## 2020-08-19 PROCEDURE — 72158 MRI LUMBAR SPINE W/O & W/DYE: CPT

## 2020-08-19 PROCEDURE — 87086 URINE CULTURE/COLONY COUNT: CPT

## 2020-08-19 PROCEDURE — 36591 DRAW BLOOD OFF VENOUS DEVICE: CPT

## 2020-08-19 PROCEDURE — 82746 ASSAY OF FOLIC ACID SERUM: CPT

## 2020-08-19 PROCEDURE — 82550 ASSAY OF CK (CPK): CPT

## 2020-08-19 PROCEDURE — 84443 ASSAY THYROID STIM HORMONE: CPT

## 2020-08-19 PROCEDURE — 6370000000 HC RX 637 (ALT 250 FOR IP): Performed by: NURSE PRACTITIONER

## 2020-08-19 PROCEDURE — 87205 SMEAR GRAM STAIN: CPT

## 2020-08-19 PROCEDURE — 80048 BASIC METABOLIC PNL TOTAL CA: CPT

## 2020-08-19 PROCEDURE — 82607 VITAMIN B-12: CPT

## 2020-08-19 PROCEDURE — 87106 FUNGI IDENTIFICATION YEAST: CPT

## 2020-08-19 RX ORDER — SODIUM CHLORIDE 9 MG/ML
INJECTION, SOLUTION INTRAVENOUS CONTINUOUS
Status: DISCONTINUED | OUTPATIENT
Start: 2020-08-19 | End: 2020-08-21

## 2020-08-19 RX ORDER — OXYCODONE HYDROCHLORIDE 5 MG/1
5 TABLET ORAL EVERY 4 HOURS PRN
Status: DISCONTINUED | OUTPATIENT
Start: 2020-08-19 | End: 2020-08-25 | Stop reason: HOSPADM

## 2020-08-19 RX ORDER — DEXTROSE MONOHYDRATE 25 G/50ML
12.5 INJECTION, SOLUTION INTRAVENOUS PRN
Status: DISCONTINUED | OUTPATIENT
Start: 2020-08-19 | End: 2020-08-25 | Stop reason: HOSPADM

## 2020-08-19 RX ORDER — NICOTINE POLACRILEX 4 MG
15 LOZENGE BUCCAL PRN
Status: DISCONTINUED | OUTPATIENT
Start: 2020-08-19 | End: 2020-08-25 | Stop reason: HOSPADM

## 2020-08-19 RX ORDER — INSULIN LISPRO 100 [IU]/ML
0-6 INJECTION, SOLUTION INTRAVENOUS; SUBCUTANEOUS NIGHTLY
Status: DISCONTINUED | OUTPATIENT
Start: 2020-08-19 | End: 2020-08-24

## 2020-08-19 RX ORDER — DEXTROSE MONOHYDRATE 50 MG/ML
100 INJECTION, SOLUTION INTRAVENOUS PRN
Status: DISCONTINUED | OUTPATIENT
Start: 2020-08-19 | End: 2020-08-25 | Stop reason: HOSPADM

## 2020-08-19 RX ORDER — INSULIN LISPRO 100 [IU]/ML
0-12 INJECTION, SOLUTION INTRAVENOUS; SUBCUTANEOUS
Status: DISCONTINUED | OUTPATIENT
Start: 2020-08-19 | End: 2020-08-24

## 2020-08-19 RX ADMIN — LEVETIRACETAM 500 MG: 500 TABLET, FILM COATED ORAL at 20:58

## 2020-08-19 RX ADMIN — Medication 10 ML: at 09:25

## 2020-08-19 RX ADMIN — Medication 10 ML: at 20:58

## 2020-08-19 RX ADMIN — Medication 500 MG: at 09:23

## 2020-08-19 RX ADMIN — INSULIN LISPRO 4 UNITS: 100 INJECTION, SOLUTION INTRAVENOUS; SUBCUTANEOUS at 09:45

## 2020-08-19 RX ADMIN — PIPERACILLIN SODIUM AND TAZOBACTAM SODIUM 4.5 G: 4; .5 INJECTION, POWDER, LYOPHILIZED, FOR SOLUTION INTRAVENOUS at 20:58

## 2020-08-19 RX ADMIN — GADOTERIDOL 20 ML: 279.3 INJECTION, SOLUTION INTRAVENOUS at 08:18

## 2020-08-19 RX ADMIN — SODIUM CHLORIDE: 9 INJECTION, SOLUTION INTRAVENOUS at 09:25

## 2020-08-19 RX ADMIN — INSULIN LISPRO 4 UNITS: 100 INJECTION, SOLUTION INTRAVENOUS; SUBCUTANEOUS at 11:49

## 2020-08-19 RX ADMIN — MULTIPLE VITAMINS W/ MINERALS TAB 1 TABLET: TAB at 09:23

## 2020-08-19 RX ADMIN — PIPERACILLIN SODIUM AND TAZOBACTAM SODIUM 4.5 G: 4; .5 INJECTION, POWDER, LYOPHILIZED, FOR SOLUTION INTRAVENOUS at 09:23

## 2020-08-19 RX ADMIN — INSULIN LISPRO 2 UNITS: 100 INJECTION, SOLUTION INTRAVENOUS; SUBCUTANEOUS at 21:50

## 2020-08-19 RX ADMIN — INSULIN LISPRO 2 UNITS: 100 INJECTION, SOLUTION INTRAVENOUS; SUBCUTANEOUS at 17:06

## 2020-08-19 RX ADMIN — PIPERACILLIN SODIUM AND TAZOBACTAM SODIUM 4.5 G: 4; .5 INJECTION, POWDER, LYOPHILIZED, FOR SOLUTION INTRAVENOUS at 15:25

## 2020-08-19 RX ADMIN — LEVETIRACETAM 500 MG: 500 TABLET, FILM COATED ORAL at 09:23

## 2020-08-19 RX ADMIN — PIPERACILLIN SODIUM AND TAZOBACTAM SODIUM 4.5 G: 4; .5 INJECTION, POWDER, LYOPHILIZED, FOR SOLUTION INTRAVENOUS at 03:35

## 2020-08-19 ASSESSMENT — PAIN DESCRIPTION - PROGRESSION
CLINICAL_PROGRESSION: NOT CHANGED
CLINICAL_PROGRESSION: NOT CHANGED

## 2020-08-19 ASSESSMENT — PAIN SCALES - GENERAL
PAINLEVEL_OUTOF10: 0

## 2020-08-19 NOTE — PLAN OF CARE
Problem: Falls - Risk of:  Goal: Will remain free from falls  Description: Will remain free from falls  8/19/2020 1424 by Chang Tejeda RN  Outcome: Met This Shift  Note: Pt is a High fall risk. See Lenell Fell Fall Score and ABCDS Injury Risk assessments. Explained fall risk precautions to pt and family and rationale behind their use to keep the patient safe. Pt bed is in low position, side rails up, call light and belongings are in reach. Fall wristband applied and present on pts wrist.  Chair Alarm on. Pt encouraged to call for assistance. Will continue with hourly rounds for PO intake, pain needs, toileting and repositioning as needed. Problem: Pain:  Goal: Pain level will decrease  Description: Pt has not complained of pain during this shift. Will continue to monitor. 8/19/2020 1424 by Cahng Tejeda RN  Outcome: Met This Shift  Note: Pt reported he was not in pain. Nurse will continue to monitor. Problem: PROTECTIVE PRECAUTIONS  Goal: Patient will remain free of nosocomial Infections  Description: Pt currently in a private, positive pressure room. Educated pt on wearing a mask when neutropenic and/or leaving the floor. No living plants or flowers allowed. Also reinforced importance of hand hygiene. Pt following a low microbial diet. Surfaces throughout room cleaned with bleach wipes per unit policy. 8/19/2020 1424 by Chang Tejeda RN  Outcome: Ongoing  Note: Pt remains in protective precautions. No living plants or fresh flowers in his/her room. Patient educated on wearing mask when in hallways. Patient, staff, and visitors adhering to handwashing guidelines. Patient cleansed with chlorhexidine wipes and linens changed daily per protocol. Pt verbalizes understanding of low microbial diet. Patient remains free of nosocomial infections.        Problem: Bleeding:  Goal: Will show no signs and symptoms of excessive bleeding  Description: Will show no signs and symptoms of excessive bleeding  8/19/2020 1424 by Andreas Frederick RN  Outcome: Ongoing  Note: Patient's hemoglobin this AM:   Recent Labs     08/19/20  0340   HGB 12.6*     Patient's platelet count this AM:   Recent Labs     08/19/20  0340       Thrombocytopenia Precautions in place. Patient showing no signs or symptoms of active bleeding. Transfusion not indicated at this time. Patient verbalizes understanding of all instructions. Will continue to assess and implement POC. Call light within reach and hourly rounding in place. Problem: Venous Thromboembolism:  Goal: Will show no signs or symptoms of venous thromboembolism  Description: Will show no signs or symptoms of venous thromboembolism  8/19/2020 1424 by Andreas Frederick RN  Outcome: Ongoing  Note:  Pt is at risk for DVT d/t decreased mobility and cancer treatment. Pt educated on importance of activity. Pt has orders for SCDs while in bed, however pt currently refusing treatment. Reviewed risks of DVT & PE development while inpatient. Provider aware of patient's refusal and re-education of importance of prophylaxis. No new orders at this time. Will continue to re-instruct patient and intervene as appropriate. Problem: Infection - Central Venous Catheter-Associated Bloodstream Infection:  Goal: Will show no infection signs and symptoms  Description: Will show no infection signs and symptoms  Outcome: Ongoing  Note: CVC site remains free of signs/symptoms of infection. No drainage, edema, erythema, pain, or warmth noted at site. Dressing changes continue per protocol and on an as needed basis - see flowsheet. Performed CHG bath today per [facility] protocol utilizing Bed bath with CHG wipes. CVC site cleansed with CHG wipe over dressing, skin surrounding dressing, and first 6\" of IV tubing. Pt tolerated well. Continued to encourage daily CHG bathing per [facility] protocol.

## 2020-08-19 NOTE — PROGRESS NOTES
[facility] History and Physical      2020    Elana Cooper    :  1940    MRN:  [id number]    Referring MD: Kevin Willard MD  [address], [place] [address], [address]      Subjective:  Pain in both legs but strength seems excellent. MRI pending and will have LP today. ECOG PS:  (3) Capable of limited self-care, confined to bed or chair > 50% of waking hours    KPS: 60% Requires occasional assistance, but is able to care for most of his personal needs    Isolation:  None     Medications    Scheduled Meds:   [Held by provider] apixaban  2.5 mg Oral BID    therapeutic multivitamin-minerals  1 tablet Oral Daily    vitamin C  500 mg Oral Daily    levETIRAcetam  500 mg Oral BID    sodium chloride flush  10 mL Intravenous 2 times per day    Saline Mouthwash  15 mL Swish & Spit 4x Daily AC & HS    piperacillin-tazobactam  4.5 g Intravenous Q6H     Continuous Infusions:   sodium chloride      sodium chloride      potassium chloride       PRN Meds:.ondansetron, sodium chloride, sodium chloride flush, potassium chloride, magnesium sulfate, magnesium hydroxide, Saline Mouthwash, alteplase, acetaminophen, traMADol      ROS:  As noted above, otherwise remainder of 10-point ROS negative      Physical Exam:     Vital Signs:  /78   Pulse 81   Temp 99.6 °F (37.6 °C) (Oral)   Resp 18   Ht 5' 11\" (1.803 m)   Wt 250 lb (113.4 kg)   SpO2 (!) 84%   BMI 34.87 kg/m²     Weight:    Wt Readings from Last 3 Encounters:   20 250 lb (113.4 kg)       General: Awake, alert and oriented.   HEENT: normocephalic, alopecia, PERRL, no scleral erythema or icterus, Oral mucosa moist and intact, throat clear  NECK: supple without palpable adenopathy  BACK: Straight negative CVAT  SKIN: warm dry and intact without lesions rashes or masses  CHEST: CTA bilaterally without use of accessory muscles  CV: Normal S1 S2, RRR, no MRG  ABD: NT ND normoactive BS, no palpable masses or

## 2020-08-19 NOTE — PLAN OF CARE
Problem: Falls - Risk of:  Goal: Will remain free from falls  Description: Will remain free from falls  Outcome: Ongoing    [place] Risk per VALENCIA/ABCDS: Pt bed is in low position, side rails up, call light and belongings are in reach. Fall risk light is on outside pts room. Pt encouraged to call for assistance as needed. Will continue with hourly rounds for PO intake, pain needs, toileting and repositioning as needed. Problem: PROTECTIVE PRECAUTIONS  Goal: Patient will remain free of nosocomial Infections  Description: Pt currently in a private, positive pressure room. Educated pt on wearing a mask when neutropenic and/or leaving the floor. No living plants or flowers allowed. Also reinforced importance of hand hygiene. Pt following a low microbial diet. Surfaces throughout room cleaned with bleach wipes per unit policy. Outcome: Ongoing    Problem: Bleeding:  Goal: Will show no signs and symptoms of excessive bleeding  Description: Will show no signs and symptoms of excessive bleeding  Outcome: Ongoing   Patient's hemoglobin this AM: No results for input(s): HGB in the last 72 hours. Patient's platelet count this AM: No results for input(s): PLT in the last 72 hours. Thrombocytopenia Precautions in place. Patient showing no signs or symptoms of active bleeding. Transfusion not indicated at this time. Patient verbalizes understanding of all instructions. Will continue to assess and implement POC. Call light within reach and hourly rounding in place. Problem: Pain:  Goal: Control of chronic pain  Description: Control of chronic pain  Outcome: Ongoing   Pt has not complained of pain during this shift. Will continue to monitor. Problem: Discharge Planning:  Goal: Discharged to appropriate level of care  Description: Discharged to appropriate level of care  Outcome: Ongoing  Pt has been updated on plan of care and has no questions at this time. Will continue to monitor.

## 2020-08-19 NOTE — PLAN OF CARE
Problem: Falls - Risk of:  Goal: Will remain free from falls  Description: Will remain free from falls  8/19/2020 0512 by Aliza Baker RN  Outcome: Ongoing  8/19/2020 0512 by Aliza Baker RN  Outcome: Ongoing    Pt is a High fall risk. See Kevan Westfir Fall Score and ABCDS Injury Risk assessments.   + Screening for Orthostasis and/or + High Fall Risk per VALENCIA/ABCDS: Explained fall risk precautions to pt and family and rationale behind their use to keep the patient safe. Pt bed is in low position, side rails up, call light and belongings are in reach. Fall wristband applied and present on pts wrist.  Chair Alarm on. Pt encouraged to call for assistance. Will continue with hourly rounds for PO intake, pain needs, toileting and repositioning as needed. Problem: Bleeding:  Goal: Will show no signs and symptoms of excessive bleeding  Description: Will show no signs and symptoms of excessive bleeding  8/19/2020 0512 by Aliza Baker RN  Outcome: Ongoing    Patient's hemoglobin this AM:   Recent Labs     08/19/20  0340   HGB 12.6*     Patient's platelet count this AM:   Recent Labs     08/19/20  0340       Thrombocytopenia Precautions in place. Patient showing no signs or symptoms of active bleeding. Transfusion not indicated at this time. Patient verbalizes understanding of all instructions. Will continue to assess and implement POC. Call light within reach and hourly rounding in place. Problem: Pain:  Goal: Pain level will decrease  Description: Pt has not complained of pain during this shift. Will continue to monitor. 8/19/2020 0512 by Aliza Baker RN  Outcome: Ongoing     Pt states pain in legs during this shift and inability to get comfortable. Tramadol given per orders. Pt satisfied.      Problem: Venous Thromboembolism:  Goal: Will show no signs or symptoms of venous thromboembolism  Description: Will show no signs or symptoms of venous thromboembolism  8/19/2020 0512 by Aliaz Baker RN  Outcome: Ongoing    Adherent with DVT Prevention: Pt is at risk for DVT d/t decreased mobility and cancer treatment. Pt educated on importance of activity. Pt has orders for SCDs while in bed. Pt verbalizes understanding of need for prophylaxis while inpatient. Problem: Discharge Planning:  Goal: Discharged to appropriate level of care  Description: Discharged to appropriate level of care  8/19/2020 0512 by Arely Vera RN  Outcome: Ongoing    Will continue to update pt on POC.

## 2020-08-19 NOTE — CONSULTS
[facility]  [id number]   1940   8/19/2020    Requesting physician: Dr. Ronnie Denton     Reason for consultation: thecal sac narrowing     History of present illness: Patient is a [de-identified] y.o. 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 He had a follow up appointment at [facility] yesterday (currently enrolled in clinical trial with CAR-T) and was in his usual state of health. He and his wife went home after the appointment, he was sitting at the table eating his lunch and had a sudden onset of numbness to both thighs. He says the numbness did not extend below his knee, but does complain of chronic decreased sensation to both feet for years. This numbness was not associated with weakness according to the patient, he was able to walk to the car from the house when this was occurring. The patient denies loss of bowel/bladder function, denies back pain or spasms throughout this episode, he does reports some leg cramping while lying flat in MRI today. The numbness lasted for roughly 3-4 hours and started spontaneously resolving. The patient was admitted overnight and as of this morning states that he feels completely back to his baseline. MRI of thoracic and lumbar spine completed revealing focal T2 hyperintense intramedullary signal at T7 suggestive of transverse myelitis, and DDD at L4-5 with moderate to severe thecal sac narrowing. Neurosurgery was consulted for recommendations. ROS:   GENERAL:  Denies fever or recent illness.  Denies weight changes   EYES:  Denies vision change or diplopia  EARS:  Denies hearing loss  CARDIAC:  Denies chest pain  RESPIRATORY:  Denies shortness of breath  SKIN:  Denies rash or lesions   HEM:  Denies excessive bruising  PSYCH:  Denies anxiety or depression  NEURO:  Denies headache,lateralizing weakness, reports numbness in bilateral thighs that has resolved   :  Denies urinary difficulty  GI: Denies nausea, vomiting, diarrhea or constipation  MUSCULOSKELETAL:  Reports leg cramping that has resolved     No Known Allergies    No past medical history on file. No past surgical history on file. Social History     Occupational History    Not on file   Tobacco Use    Smoking status: Never Smoker    Smokeless tobacco: Never Used   Substance and Sexual Activity    Alcohol use: Not on file    Drug use: Not on file    Sexual activity: Not on file        No family history on file.      Outpatient Medications Marked as Taking for the 8/18/20 encounter [facility] Encounter)   Medication Sig Dispense Refill    levETIRAcetam (KEPPRA) 500 MG tablet Take 500 mg by mouth 2 times daily      ondansetron (ZOFRAN) 8 MG tablet Take 8 mg by mouth every 8 hours as needed for Nausea or Vomiting      apixaban (ELIQUIS) 2.5 MG TABS tablet Take 2.5 mg by mouth 2 times daily      Multiple Vitamins-Minerals (THERAPEUTIC MULTIVITAMIN-MINERALS) tablet Take 1 tablet by mouth daily      vitamin C (ASCORBIC ACID) 500 MG tablet Take 500 mg by mouth daily        Current Facility-Administered Medications   Medication Dose Route Frequency Provider Last Rate Last Dose    0.9 % sodium chloride infusion   Intravenous Continuous Carla Rose MD 50 mL/hr at 08/19/20 0925      insulin lispro (1 Unit Dial) 0-12 Units  0-12 Units Subcutaneous TID WC CHIKIS Harrison CNP   4 Units at 08/19/20 1149    insulin lispro (1 Unit Dial) 0-6 Units  0-6 Units Subcutaneous Nightly CHIKIS Harrison CNP        glucose (GLUTOSE) 40 % oral gel 15 g  15 g Oral PRN CHIKIS Harrison CNP        dextrose 50 % IV solution  12.5 g Intravenous PRN CHIKIS Harrison CNP        glucagon (rDNA) injection 1 mg  1 mg Intramuscular PRN CHIKIS Harrison CNP        dextrose 5 % solution  100 mL/hr Intravenous PRN CHIKIS Harrison CNP  oxyCODONE (ROXICODONE) immediate release tablet 5 mg  5 mg Oral Q4H PRN CHIKIS Smith - CNP        [Held by provider] apixaban (ELIQUIS) tablet 2.5 mg  2.5 mg Oral BID Raimundo Rios   Stopped at 08/19/20 0900    therapeutic multivitamin-minerals 1 tablet  1 tablet Oral Daily Raimundo Rios   1 tablet at 08/19/20 3851    vitamin C (ASCORBIC ACID) tablet 500 mg  500 mg Oral Daily Raimundo Rios   500 mg at 08/19/20 6754    levETIRAcetam (KEPPRA) tablet 500 mg  500 mg Oral BID DEX Rios   500 mg at 08/19/20 7239    ondansetron (ZOFRAN) tablet 8 mg  8 mg Oral Q8H PRN DEX Rios        0.9 % sodium chloride infusion  500 mL Intravenous Continuous PRN DEX Rios        sodium chloride flush 0.9 % injection 10 mL  10 mL Intravenous 2 times per day DEX Rios   10 mL at 08/19/20 0925    sodium chloride flush 0.9 % injection 10 mL  10 mL Intravenous PRN DEX Rios        potassium chloride 20 mEq/50 mL IVPB (Central Line)  20 mEq Intravenous Continuous PRN DEX Rios        magnesium sulfate 4 g in 100 mL IVPB premix  4 g Intravenous PRN DEX Rios        magnesium hydroxide (MILK OF MAGNESIA) 400 MG/5ML suspension 30 mL  30 mL Oral Daily PRN DEX Rios        Saline Mouthwash 15 mL  15 mL Swish & Spit 4x Daily AC & HS Raimundo Rios   15 mL at 08/18/20 1703    Saline Mouthwash 15 mL  15 mL Swish & Spit Q4H PRN Raimundo Rios        alteplase (CATHFLO) injection 2 mg  2 mg Intracatheter PRN DEX Rios        acetaminophen (TYLENOL) tablet 650 mg  650 mg Oral Q4H PRN Marizol Simon MD   650 mg at 08/18/20 2102    piperacillin-tazobactam (ZOSYN) 4.5 g in dextrose 5 % 100 mL IVPB (mini-bag)  4.5 g Intravenous Q6H Marizol Simon MD   Stopped at 08/19/20 1000    traMADol (ULTRAM) tablet 50 mg  50 mg Oral Q6H PRN Marizol Simon MD   50 mg at 08/18/20 5252      Objective:  /74 Pulse 78   Temp 99.1 °F (37.3 °C) (Oral)   Resp 18   Ht 5' 11\" (1.803 m)   Wt 250 lb 12.8 oz (113.8 kg)   SpO2 97%   BMI 34.98 kg/m²     Physical Exam:  Patient seen and examined   General: Well developed. Alert and cooperative in no acute distress. HENT: atraumatic, neck supple  Eyes: Optic discs: Not tested  Pulmonary: unlabored respiratory effort  Cardiovascular:  Warm well perfused. Bilateral lower extremity edema, R>L. Gastrointestinal: abdomen soft, NT, ND    Neurologic Exam:  Neurological:  Mental Status: Awake, alert, oriented x 4, speech clear and appropriate  Attention: Intact  Language: No aphasia or dysarthria noted  Sensation: Intact to all extremities to light touch, reports decreased sensation to feet for years  Coordination: Intact    DTRs:    Right  Left    ankle clonus  - -   toes (babinski)  down down       Musculoskeletal:   Gait: Not tested   Assist devices: None   Tone: normal   Motor strength:    Right  Left    Right  Left    Deltoid  5 5  Hip Flex  5 5   Biceps  5 5  Knee Extensors  5 5   Triceps  5 5  Knee Flexors  5 5   Wrist Ext  5 5  Ankle Dorsiflex. 5 5   Wrist Flex  5 5  Ankle Plantarflex. 5 5   Handgrip  5 5  Ext Jorge Luis Longus  5 5   Thumb Ext  5 5           Radiological Findings:    MRI thoracic and lumbar spine:   Thoracic spine  1. Focal T2 hyperintense intramedullary signal at T7 suggestive of transverse myelitis. 2. Multilevel degenerative disc disease as above. 3. Multilevel neural foraminal stenosis, most pronounced at left T10-T11. Lumbar spine   1. Multilevel degenerative disc disease as detailed above, most pronounced at L4-5 where there is moderate to severe thecal sac narrowing. 2. Multilevel mild neural foraminal stenosis.            Labs  Recent Labs     08/18/20  1736 08/18/20  2118 08/19/20  0340   NA  --  134* 135*   CL  --  98* 98*   CO2  --  25 25   BUN  --  19 19   CREATININE  --  1.0 1.1   GLUCOSE  --  229* 223*   PHOS 2.8 2.5 3.2   MG  --   --

## 2020-08-20 ENCOUNTER — APPOINTMENT (OUTPATIENT)
Dept: GENERAL RADIOLOGY | Age: 80
DRG: 552 | End: 2020-08-20
Attending: INTERNAL MEDICINE
Payer: MEDICARE

## 2020-08-20 LAB
ALBUMIN SERPL-MCNC: 3.9 G/DL (ref 3.4–5)
ALP BLD-CCNC: 68 U/L (ref 40–129)
ALT SERPL-CCNC: 18 U/L (ref 10–40)
ANION GAP SERPL CALCULATED.3IONS-SCNC: 9 MMOL/L (ref 3–16)
APPEARANCE CSF: ABNORMAL
AST SERPL-CCNC: 20 U/L (ref 15–37)
BASOPHILS ABSOLUTE: 0 K/UL (ref 0–0.2)
BASOPHILS RELATIVE PERCENT: 0.4 %
BILIRUB SERPL-MCNC: 0.6 MG/DL (ref 0–1)
BILIRUBIN DIRECT: <0.2 MG/DL (ref 0–0.3)
BILIRUBIN, INDIRECT: NORMAL MG/DL (ref 0–1)
BUN BLDV-MCNC: 17 MG/DL (ref 7–20)
C-REACTIVE PROTEIN: 7.8 MG/L (ref 0–5.1)
CALCIUM SERPL-MCNC: 8.4 MG/DL (ref 8.3–10.6)
CHLORIDE BLD-SCNC: 99 MMOL/L (ref 99–110)
CLOT EVALUATION CSF: ABNORMAL
CO2: 26 MMOL/L (ref 21–32)
COLOR CSF: COLORLESS
CREAT SERPL-MCNC: 1.2 MG/DL (ref 0.8–1.3)
EOSINOPHILS ABSOLUTE: 0.1 K/UL (ref 0–0.6)
EOSINOPHILS RELATIVE PERCENT: 3.9 %
ESTIMATED AVERAGE GLUCOSE: 200.1 MG/DL
FERRITIN: 269.5 NG/ML (ref 30–400)
GFR AFRICAN AMERICAN: >60
GFR NON-AFRICAN AMERICAN: 58
GLUCOSE BLD-MCNC: 144 MG/DL (ref 70–99)
GLUCOSE BLD-MCNC: 157 MG/DL (ref 70–99)
GLUCOSE BLD-MCNC: 160 MG/DL (ref 70–99)
GLUCOSE BLD-MCNC: 178 MG/DL (ref 70–99)
GLUCOSE BLD-MCNC: 292 MG/DL (ref 70–99)
GLUCOSE, CSF: 104 MG/DL (ref 40–80)
HBA1C MFR BLD: 8.6 %
HCT VFR BLD CALC: 35.5 % (ref 40.5–52.5)
HEMOGLOBIN: 12.3 G/DL (ref 13.5–17.5)
LYMPHOCYTES ABSOLUTE: 0.3 K/UL (ref 1–5.1)
LYMPHOCYTES RELATIVE PERCENT: 17.4 %
MCH RBC QN AUTO: 33.3 PG (ref 26–34)
MCHC RBC AUTO-ENTMCNC: 34.5 G/DL (ref 31–36)
MCV RBC AUTO: 96.7 FL (ref 80–100)
MONOCYTES ABSOLUTE: 0.2 K/UL (ref 0–1.3)
MONOCYTES RELATIVE PERCENT: 13.4 %
NEUTROPHILS ABSOLUTE: 1 K/UL (ref 1.7–7.7)
NEUTROPHILS RELATIVE PERCENT: 64.9 %
NO DIFFERENTIAL CSF: ABNORMAL
PATH CONSULT CSF: YES
PDW BLD-RTO: 12.8 % (ref 12.4–15.4)
PERFORMED ON: ABNORMAL
PLATELET # BLD: 137 K/UL (ref 135–450)
PMV BLD AUTO: 8.3 FL (ref 5–10.5)
POTASSIUM SERPL-SCNC: 4.1 MMOL/L (ref 3.5–5.1)
PROTEIN CSF: 68 MG/DL (ref 15–45)
RBC # BLD: 3.68 M/UL (ref 4.2–5.9)
RBC CSF: 1145 /CUMM
SODIUM BLD-SCNC: 134 MMOL/L (ref 136–145)
TOTAL PROTEIN: 6.4 G/DL (ref 6.4–8.2)
TUBE NUMBER CSF: ABNORMAL
URINE CULTURE, ROUTINE: NORMAL
WBC # BLD: 1.6 K/UL (ref 4–11)
WBC CSF: 4 /CUMM (ref 0–5)

## 2020-08-20 PROCEDURE — 83036 HEMOGLOBIN GLYCOSYLATED A1C: CPT

## 2020-08-20 PROCEDURE — 62328 DX LMBR SPI PNXR W/FLUOR/CT: CPT

## 2020-08-20 PROCEDURE — 2060000000 HC ICU INTERMEDIATE R&B

## 2020-08-20 PROCEDURE — 6370000000 HC RX 637 (ALT 250 FOR IP): Performed by: NURSE PRACTITIONER

## 2020-08-20 PROCEDURE — 6370000000 HC RX 637 (ALT 250 FOR IP): Performed by: PHYSICIAN ASSISTANT

## 2020-08-20 PROCEDURE — 80048 BASIC METABOLIC PNL TOTAL CA: CPT

## 2020-08-20 PROCEDURE — 85025 COMPLETE CBC W/AUTO DIFF WBC: CPT

## 2020-08-20 PROCEDURE — 82728 ASSAY OF FERRITIN: CPT

## 2020-08-20 PROCEDURE — 97116 GAIT TRAINING THERAPY: CPT

## 2020-08-20 PROCEDURE — 009U3ZX DRAINAGE OF SPINAL CANAL, PERCUTANEOUS APPROACH, DIAGNOSTIC: ICD-10-PCS | Performed by: RADIOLOGY

## 2020-08-20 PROCEDURE — 6360000002 HC RX W HCPCS: Performed by: INTERNAL MEDICINE

## 2020-08-20 PROCEDURE — 2500000003 HC RX 250 WO HCPCS: Performed by: INTERNAL MEDICINE

## 2020-08-20 PROCEDURE — 6370000000 HC RX 637 (ALT 250 FOR IP): Performed by: INTERNAL MEDICINE

## 2020-08-20 PROCEDURE — 84157 ASSAY OF PROTEIN OTHER: CPT

## 2020-08-20 PROCEDURE — 97530 THERAPEUTIC ACTIVITIES: CPT

## 2020-08-20 PROCEDURE — 97161 PT EVAL LOW COMPLEX 20 MIN: CPT

## 2020-08-20 PROCEDURE — 87205 SMEAR GRAM STAIN: CPT

## 2020-08-20 PROCEDURE — 36591 DRAW BLOOD OFF VENOUS DEVICE: CPT

## 2020-08-20 PROCEDURE — 80076 HEPATIC FUNCTION PANEL: CPT

## 2020-08-20 PROCEDURE — 89050 BODY FLUID CELL COUNT: CPT

## 2020-08-20 PROCEDURE — B01B1ZZ FLUOROSCOPY OF SPINAL CORD USING LOW OSMOLAR CONTRAST: ICD-10-PCS | Performed by: RADIOLOGY

## 2020-08-20 PROCEDURE — 82945 GLUCOSE OTHER FLUID: CPT

## 2020-08-20 PROCEDURE — 86140 C-REACTIVE PROTEIN: CPT

## 2020-08-20 PROCEDURE — 2580000003 HC RX 258: Performed by: INTERNAL MEDICINE

## 2020-08-20 PROCEDURE — 2580000003 HC RX 258: Performed by: PHYSICIAN ASSISTANT

## 2020-08-20 RX ORDER — VALACYCLOVIR HYDROCHLORIDE 500 MG/1
500 TABLET, FILM COATED ORAL 2 TIMES DAILY
Status: DISCONTINUED | OUTPATIENT
Start: 2020-08-20 | End: 2020-08-25 | Stop reason: HOSPADM

## 2020-08-20 RX ORDER — FLUCONAZOLE 200 MG/1
200 TABLET ORAL DAILY
Status: DISCONTINUED | OUTPATIENT
Start: 2020-08-20 | End: 2020-08-25 | Stop reason: HOSPADM

## 2020-08-20 RX ADMIN — LEVETIRACETAM 500 MG: 500 TABLET, FILM COATED ORAL at 21:00

## 2020-08-20 RX ADMIN — LEVETIRACETAM 500 MG: 500 TABLET, FILM COATED ORAL at 08:01

## 2020-08-20 RX ADMIN — FLUCONAZOLE 200 MG: 200 TABLET ORAL at 08:01

## 2020-08-20 RX ADMIN — Medication 10 ML: at 21:01

## 2020-08-20 RX ADMIN — SODIUM CHLORIDE: 9 INJECTION, SOLUTION INTRAVENOUS at 21:53

## 2020-08-20 RX ADMIN — INSULIN LISPRO 2 UNITS: 100 INJECTION, SOLUTION INTRAVENOUS; SUBCUTANEOUS at 07:10

## 2020-08-20 RX ADMIN — PIPERACILLIN SODIUM AND TAZOBACTAM SODIUM 4.5 G: 4; .5 INJECTION, POWDER, LYOPHILIZED, FOR SOLUTION INTRAVENOUS at 21:00

## 2020-08-20 RX ADMIN — VALACYCLOVIR HYDROCHLORIDE 500 MG: 500 TABLET, FILM COATED ORAL at 21:00

## 2020-08-20 RX ADMIN — PIPERACILLIN SODIUM AND TAZOBACTAM SODIUM 4.5 G: 4; .5 INJECTION, POWDER, LYOPHILIZED, FOR SOLUTION INTRAVENOUS at 15:48

## 2020-08-20 RX ADMIN — PIPERACILLIN SODIUM AND TAZOBACTAM SODIUM 4.5 G: 4; .5 INJECTION, POWDER, LYOPHILIZED, FOR SOLUTION INTRAVENOUS at 04:00

## 2020-08-20 RX ADMIN — MULTIPLE VITAMINS W/ MINERALS TAB 1 TABLET: TAB at 08:01

## 2020-08-20 RX ADMIN — VALACYCLOVIR HYDROCHLORIDE 500 MG: 500 TABLET, FILM COATED ORAL at 08:01

## 2020-08-20 RX ADMIN — INSULIN LISPRO 3 UNITS: 100 INJECTION, SOLUTION INTRAVENOUS; SUBCUTANEOUS at 21:48

## 2020-08-20 RX ADMIN — LIDOCAINE HYDROCHLORIDE 5 ML: 10 INJECTION, SOLUTION EPIDURAL; INFILTRATION; INTRACAUDAL; PERINEURAL at 12:11

## 2020-08-20 RX ADMIN — Medication 10 ML: at 08:01

## 2020-08-20 RX ADMIN — PIPERACILLIN SODIUM AND TAZOBACTAM SODIUM 4.5 G: 4; .5 INJECTION, POWDER, LYOPHILIZED, FOR SOLUTION INTRAVENOUS at 08:00

## 2020-08-20 RX ADMIN — Medication 500 MG: at 08:01

## 2020-08-20 RX ADMIN — INSULIN LISPRO 2 UNITS: 100 INJECTION, SOLUTION INTRAVENOUS; SUBCUTANEOUS at 16:10

## 2020-08-20 RX ADMIN — TRAMADOL HYDROCHLORIDE 50 MG: 50 TABLET, FILM COATED ORAL at 02:14

## 2020-08-20 ASSESSMENT — PAIN SCALES - GENERAL
PAINLEVEL_OUTOF10: 0
PAINLEVEL_OUTOF10: 3
PAINLEVEL_OUTOF10: 0
PAINLEVEL_OUTOF10: 0

## 2020-08-20 NOTE — PLAN OF CARE
Problem: Falls - Risk of:  Goal: Will remain free from falls  Description: Will remain free from falls  8/20/2020 0136 by Erin Ruvalcaba RN  Outcome: Ongoing  Note: Pt is a High fall risk. See [facility] Fall Score and ABCDS Injury Risk assessments.   + Screening for Orthostasis and/or + High Fall Risk per VALENCIA/ABCDS: Explained fall risk precautions to pt and family and rationale behind their use to keep the patient safe. Pt bed is in low position, side rails up, call light and belongings are in reach. Fall wristband applied and present on pts wrist.  Bed alarm on. Pt encouraged to call for assistance. Will continue with hourly rounds for PO intake, pain needs, toileting and repositioning as needed. .       Problem: PROTECTIVE PRECAUTIONS  Goal: Patient will remain free of nosocomial Infections  Description: Pt currently in a private, positive pressure room. Educated pt on wearing a mask when neutropenic and/or leaving the floor. No living plants or flowers allowed. Also reinforced importance of hand hygiene. Pt following a low microbial diet. Surfaces throughout room cleaned with bleach wipes per unit policy. 8/20/2020 0136 by Erin Ruvalcaba RN  Outcome: Ongoing  Note: Pt remains in protective precautions. Pt educated on wearing mask when in hallways. Pt, staff, and visitors adhering to handwashing guidelines. Pt educated to shower or bathe daily with chlorhexidine and linens changed daily per protocol. Pt verbalizes understanding of low microbial diet. Will continue to monitor. Problem: Bleeding:  Goal: Will show no signs and symptoms of excessive bleeding  Description: Will show no signs and symptoms of excessive bleeding  8/20/2020 0136 by Erin Ruvalcaba RN  Outcome: Ongoing  Note: Pt platelet count 262. Pt educated/reminded about bleeding precautions. Pt verbalizes understanding. No signs of active bleeding this shift. Will continue to monitor.         Problem: Pain:  Goal: Pain level will decrease  Description: Pt has not complained of pain during this shift. Will continue to monitor. 8/20/2020 0136 by Shaan Verduzco RN  Outcome: Ongoing  Note: Pt has complained of pain in his back, has declined the need for medication. Heat packs have been provided and pt is satisfied with that. Will continue to monitor. Problem: Venous Thromboembolism:  Goal: Will show no signs or symptoms of venous thromboembolism  Description: Will show no signs or symptoms of venous thromboembolism  8/20/2020 0136 by Shaan Verduzco RN  Outcome: Ongoing  Note: Adherent with DVT Prevention: Pt is at risk for DVT d/t decreased mobility and cancer treatment. Pt educated on importance of activity. Pt has orders for Subcut prophylactic lovenox. Pt verbalizes understanding of need for prophylaxis while inpatient. Problem: Discharge Planning:  Goal: Discharged to appropriate level of care  Description: Discharged to appropriate level of care  8/20/2020 0136 by Shaan Verduzco RN  Outcome: Ongoing  Note: Pt is up to date on plan of care. Problem: Infection - Central Venous Catheter-Associated Bloodstream Infection:  Goal: Will show no infection signs and symptoms  Description: Will show no infection signs and symptoms  8/20/2020 0136 by Shaan Verduzco RN  Outcome: Ongoing  Note: Pt afebrile throughout shift. VSS. No sign of redness, tenderness, or drainage at line site. Will continue to monitor.

## 2020-08-20 NOTE — PROGRESS NOTES
Occupational Therapy  Referral received, chart reviewed/reviewed PT note. Spoke with Pt and pt's wife and they reported pt is at or close to performing at baseline for functional mobility and ADLs. Educated on role of OT, no need for formal eval, no OT needs found. Pt and wife reported no concerns. OT to sign off, RN aware.

## 2020-08-20 NOTE — PLAN OF CARE
Problem: Pain:  Goal: Pain level will decrease  Description: Pt has not complained of pain during this shift. Will continue to monitor. Outcome: Met This Shift     Problem: Falls - Risk of:  Goal: Will remain free from falls  Description: Will remain free from falls  Outcome: Ongoing  Note: Explained fall risk precautions to pt and family and rationale behind their use to keep the patient safe. Pt bed is in low position, side rails up, call light and belongings are in reach. Fall wristband applied and present on pts wrist.  Chair Alarm on. Pt encouraged to call for assistance. Will continue with hourly rounds for PO intake, pain needs, toileting and repositioning as needed. Problem: PROTECTIVE PRECAUTIONS  Goal: Patient will remain free of nosocomial Infections  Description: Pt currently in a private, positive pressure room. Educated pt on wearing a mask when neutropenic and/or leaving the floor. No living plants or flowers allowed. Also reinforced importance of hand hygiene. Pt following a low microbial diet. Surfaces throughout room cleaned with bleach wipes per unit policy. Outcome: Ongoing  Note: Pt remains in protective precautions. No living plants or fresh flowers in his/her room. Patient educated on wearing mask when in hallways. Patient, staff, and visitors adhering to handwashing guidelines. Patient cleansed with chlorhexidine wipes and linens changed daily per protocol. Pt verbalizes understanding of low microbial diet. Patient remains free of nosocomial infections. Problem: Bleeding:  Goal: Will show no signs and symptoms of excessive bleeding  Description: Will show no signs and symptoms of excessive bleeding  Outcome: Ongoing  Note: Patient's hemoglobin this AM:   Recent Labs     08/20/20  0410   HGB 12.3*     Patient's platelet count this AM:   Recent Labs     08/20/20  0410       Thrombocytopenia Precautions in place.   Patient showing no signs or symptoms of active bleeding. Transfusion not indicated at this time. Patient verbalizes understanding of all instructions. Will continue to assess and implement POC. Call light within reach and hourly rounding in place. Problem: Venous Thromboembolism:  Goal: Will show no signs or symptoms of venous thromboembolism  Description: Will show no signs or symptoms of venous thromboembolism  Outcome: Ongoing  Note: Pt is at risk for DVT d/t decreased mobility and cancer treatment. Pt educated on importance of activity. Pt has orders for SCDs while in bed, however pt currently refusing treatment. Reviewed risks of DVT & PE development while inpatient. Provider aware of patient's refusal and re-education of importance of prophylaxis. No new orders at this time. Will continue to re-instruct patient and intervene as appropriate. Problem: Discharge Planning:  Goal: Discharged to appropriate level of care  Description: Discharged to appropriate level of care  Outcome: Ongoing     Problem: Infection - Central Venous Catheter-Associated Bloodstream Infection:  Goal: Will show no infection signs and symptoms  Description: Will show no infection signs and symptoms  Outcome: Ongoing  Note: CVC site remains free of signs/symptoms of infection. No drainage, edema, erythema, pain, or warmth noted at site. Dressing changes continue per protocol and on an as needed basis - see flowsheet. Performed CHG bath today per [facility] protocol utilizing Bed bath with CHG wipes. CVC site cleansed with CHG wipe over dressing, skin surrounding dressing, and first 6\" of IV tubing. Pt tolerated well. Continued to encourage daily CHG bathing per [facility] protocol.

## 2020-08-20 NOTE — PROGRESS NOTES
of transverse myelitis. 2. Multilevel degenerative disc disease as above. 3. Multilevel neural foraminal stenosis, most pronounced at left T10-T11.              Lumbar spine         1. Multilevel degenerative disc disease as detailed above, most pronounced at L4-5 where there is moderate to severe thecal sac narrowing. 2. Multilevel mild neural foraminal stenosis.                 Impression:  1. Bilateral lower extremity weakness  2. Bilateral lower extremity numbness  3. Non-Hodgkin's Lymphoma  4. History of bilateral femoral DVT        Nestor Mckeon is a [de-identified] y.o. male who presented with abrupt onset BLE numbness and subjective BLE weakness in the setting of recent CAR-T therapy and Non-Hodgkin's Lymphoma.      Per radiology read, there is Focal T2 hyperintense intramedullary signal at T7 suggestive of transverse myelitis, however the patients current clinical picture does not match this radiographic finding. Imaging also revealed severe thecal sac narrowing at the level of L4-L5. Per neurosurgery, no surgical intervention indicated.      Patient remains at his baseline.      Recommendations:  - Agree with plans for LP   - Await pending serum studies  - PT/OT, rehab as able  - If above workup normal would advise EMG of both legs when able  - The patient should follow up with Neurology as an outpatient after discharge, would advise with Dr. Daryl Medel or Dr. Nichelle Cook if an option  - If preliminary CSF unrevealing, will sign off at that time. No further recommendations.  Please call with questions         A copy of this note was provided for MD Deborah Sheets [address] Neurology  [phone]

## 2020-08-20 NOTE — PROGRESS NOTES
NUTRITION NOTE   Admission Date: 8/18/2020     Type and Reason for Visit: Initial    NUTRITION RECOMMENDATIONS:   1. PO Diet: Continue current diet  2. ONS: Not indicated at this time. NUTRITION ASSESSMENT:  Nutrition eval for pt s/p CAR-t infusion 8/14. Currently on general diet; po intake on general diet has been adequate, taking % of meals offered so far. No weight loss per EMR. RD will continue to monitor for adequacy of PO intake to meet increased nutrient needs s/p CAR-t. MALNUTRITION ASSESSMENT  Context of Malnutrition: Chronic Illness   Malnutrition Status: No malnutrition    NUTRITION DIAGNOSIS   Problem: Problem #1: Increased nutrient needs  Etiology: Acute or chronic injury or trauma  Signs & Symptoms: chemo and CAR-t infusion    NUTRITION INTERVENTION  Food and/or Nutrient Delivery:Continue Current diet   Nutrition education/counseling/coordination of care: Continue Inpatient Monitoring     NUTRITION RISK LEVEL: Risk Level: Low        The patient will still be monitored per nutrition standards of care. Consult dietitian if nutrition interventions essential to patient care is needed.      Rafael Guillory, [address]  [place]:  [phone]  Office:  [phone]

## 2020-08-20 NOTE — CARE COORDINATION
Case Management Assessment           Initial Evaluation                Date / Time of Evaluation: 8/20/2020 11:52 AM                 Assessment Completed by: Davee Hatchet    Patient Name: Pro Campbell     YOB: 1940  Diagnosis: Neurotoxicity [R29.90]     Date / Time: 8/18/2020  2:53 PM    Patient Admission Status: Inpatient    If patient is discharged prior to next notation, then this note serves as note for discharge by case management. Current PCP: Lexus Cadet MD  Clinic Patient: No    Chart Reviewed: Yes  Patient/ Family Interviewed: Yes    Initial assessment completed at bedside with: patient's spouse at bedside    Hospitalization in the last 30 days: Yes    Emergency Contacts:  Extended Emergency Contact Information  Primary Emergency Contact: [address] [place] [address] Phone: [phone]  Relation: Spouse  Secondary Emergency Contact: Jorge Espinoza  [place] Phone: [phone]  Relation: Child    Advance Directives:   Code Status: Full Code      Financial  Payor: Albin Napoles / Plan: MEDICARE PART A AND B / Product Type: *No Product type* /     Pre-cert required for SNF: No    Pharmacy    [address] [phone]  [facility] [address]  Phone: [phone] Fax: [phone]      Potential assistance Purchasing Medications: Potential Assistance Purchasing Medications: No  Does Patient want to participate in local refill/ meds to beds program?: No    Meds To Beds General Rules:  1. Can ONLY be done Monday- Friday between 8:30am-5pm  2. Prescription(s) must be in pharmacy by 3pm to be filled same day  3. Copy of patient's insurance/ prescription drug card and patient face sheet must be sent along with the prescription(s)  4. Cost of Rx cannot be added to hospital bill. If financial assistance is needed, please contact unit  or ;   or  Kang [employer] provide pharmacy voucher for patients co-pays  5. Patients can then  the prescription on their way out of the hospital at discharge, or pharmacy can deliver to the bedside if staff is available. (payment due at time of pick-up or delivery - cash, check, or card accepted)     Able to afford home medications/ co-pay costs: Yes    ADLS  Support Systems: None    PT AM-PAC: 23 /24  OT AM-PAC:   /24    [place]: tri-level esperanza  Steps: 16 steps to get to main level    Plans to RETURN to current housing: Yes  Barriers to RETURNING to current housing: medical clearance    Home Care Information  Currently ACTIVE with "[employer]" Way: No  Home Care Agency: Not Applicable    Currently ACTIVE with [place] on Aging: No  Passport/ Waiver: No  Passport/ Waiver Services: Not Applicable    Durable Medical Equipment  DME Provider: none  Equipment: independent at [address] [place] [address] and [facility] [employer] prior to admission: No  My[address] P[address]: Not Applicable  Other Respiratory Equipment: none    Informed of need to bring portable home O2 tank on day of DISCHARGE for nursing to connect prior to leaving: No  Verbalized agreement/Understanding: No  Person to bring portable tank at discharge: none    Dialysis  Active with HD/PD prior to admission: No  Nephrologist: none    HD Center:  Not Applicable    DISCHARGE PLAN:  Disposition: Home- No Services Needed    Transportation PLAN for discharge: family     Factors facilitating achievement of predicted outcomes: Family support, Motivated and Cooperative    Barriers to discharge: Medical complications    Additional Case Management Notes: Met with patient's spouse at bedside. She reports that the patient is independent at baseline. Patient was admitted for lower extremity weakness, but this has since improved. Patient and family anticipate no needs at discharge. Following for potential needs.     The Plan for Transition of Care is related to the

## 2020-08-20 NOTE — PROGRESS NOTES
Physical Therapy    Facility/Department: [address]  Initial Assessment/Treatment/Discharge Summary     NAME: Rosina Payment  : 1940  MRN: [id number]    Date of Service: 2020    Discharge Recommendations:    Rosina Mccoy scored a 23/24 on the AM-PAC short mobility form. At this time, no further PT is recommended upon discharge due to pt at functional baseline. Recommend patient returns to prior setting with prior services. PT Equipment Recommendations  Equipment Needed: No    Assessment   Assessment: Pt very near or at functional baseline performing all functional mobility at supervision-independent level. Pt planning to d/c home with wife verbalizing no safety concerns. Pt with no further acute PT needs at this time. Will sign off from PT services. Prognosis: Excellent  Decision Making: Low Complexity  Patient Education: role of PT, use of call light, d/c planning; pt verb understanding  Barriers to Learning: none  REQUIRES PT FOLLOW UP: No  Activity Tolerance  Activity Tolerance: Patient Tolerated treatment well       Patient Diagnosis(es): There were no encounter diagnoses. has no past medical history on file. has no past surgical history on file. Restrictions  Position Activity Restriction  Other position/activity restrictions: up as tolerated, If platelet count equal to or less than 10 but the patient has received a platelet transfusion, physical therapy or occupational therapy may proceed with treatment. Vision/Hearing  Vision: Within Functional Limits  Hearing: Within functional limits     Subjective  General  Chart Reviewed: Yes  Additional Pertinent Hx: [de-identified] y.o. 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   MRI of thoracic and lumbar spine completed revealing focal T2 hyperintense intramedullary signal at T7 suggestive of transverse Good  Sitting - Static: Good  Sitting - Dynamic: Good  Standing - Static: Good  Standing - Dynamic: Good        Plan   Safety Devices  Type of devices: Left in chair, Chair alarm in place, Call light within reach, Nurse notified, Gait belt    Therapy Time   Individual Concurrent Group Co-treatment   Time In 0910         Time Out 0948         Minutes 38           Timed Code Treatment Minutes:  23    Total Treatment Minutes:  [address], [place] [id number]

## 2020-08-20 NOTE — PROGRESS NOTES
[facility] [place] [facility]      2020    Adan Dumont    :  1940    MRN:  [id number]    Referring MD: Nette Guzman MD  [address], [address], [address]      Subjective:  Pain in both legs but strength seems excellent, resolving. LP today. Difficult to focus during interview. ECOG PS:  (3) Capable of limited self-care, confined to bed or chair > 50% of waking hours    KPS: 60% Requires occasional assistance, but is able to care for most of his personal needs    Isolation:  None     Medications    Scheduled Meds:   insulin lispro  0-12 Units Subcutaneous TID WC    insulin lispro  0-6 Units Subcutaneous Nightly    [Held by provider] apixaban  2.5 mg Oral BID    therapeutic multivitamin-minerals  1 tablet Oral Daily    vitamin C  500 mg Oral Daily    levETIRAcetam  500 mg Oral BID    sodium chloride flush  10 mL Intravenous 2 times per day    Saline Mouthwash  15 mL Swish & Spit 4x Daily AC & HS    piperacillin-tazobactam  4.5 g Intravenous Q6H     Continuous Infusions:   sodium chloride 50 mL/hr at 20 0925    dextrose      sodium chloride      potassium chloride       PRN Meds:.glucose, dextrose, glucagon (rDNA), dextrose, oxyCODONE, ondansetron, sodium chloride, sodium chloride flush, potassium chloride, magnesium sulfate, magnesium hydroxide, Saline Mouthwash, alteplase, acetaminophen, traMADol      ROS:  As noted above, otherwise remainder of 10-point ROS negative      Physical Exam:     Vital Signs:  /65   Pulse 75   Temp 98.6 °F (37 °C) (Oral)   Resp 16   Ht 5' 11\" (1.803 m)   Wt 250 lb 12.8 oz (113.8 kg)   SpO2 94%   BMI 34.98 kg/m²     Weight:    Wt Readings from Last 3 Encounters:   20 250 lb 12.8 oz (113.8 kg)       General: Awake, alert and oriented.   HEENT: normocephalic, alopecia, PERRL, no scleral erythema or icterus, Oral mucosa moist and intact, throat clear  NECK: supple without palpable adenopathy  BACK: Straight negative CVAT  SKIN: warm dry and intact without lesions rashes or masses  CHEST: CTA bilaterally without use of accessory muscles  CV: Normal S1 S2, RRR, no MRG  ABD: NT ND normoactive BS, no palpable masses or hepatosplenomegaly  EXTREMITIES: without edema, denies calf tenderness  NEURO: CN II - XII grossly intact; strength 4/5 in BLE except 5/5 plantar and dorsiflexioin at the ankles, sensation in the LE intact, finger to dose intact as well as upper body strength. Walks with unsteady wide based gait which is new. CATHETER: Right chest port     Laboratory Data:  CBC:   Recent Labs     08/19/20  0340 08/20/20  0410   WBC 3.2* 1.6*   HGB 12.6* 12.3*   HCT 36.7* 35.5*   MCV 98.6 96.7    137     BMP/Mag:  Recent Labs     08/18/20  1736 08/18/20  2118 08/19/20  0340 08/20/20  0410   NA  --  134* 135* 134*   K  --  4.1 4.3 4.1   CL  --  98* 98* 99   CO2  --  25 25 26   PHOS 2.8 2.5 3.2  --    BUN  --  19 19 17   CREATININE  --  1.0 1.1 1.2   MG  --   --  1.90  --      LIVP:   Recent Labs     08/19/20  0340 08/20/20  0410   AST 20 20   ALT 19 18   BILIDIR <0.2 <0.2   BILITOT 0.6 0.6   ALKPHOS 72 68     Coags:   Recent Labs     08/19/20  0340   PROTIME 11.5   INR 0.99   APTT 30.4     Uric Acid   Recent Labs     08/18/20  1736 08/19/20  0340   LABURIC 4.6 3.7     Lab Results   Component Value Date    CRP 7.8 (H) 08/20/2020    CRP 7.0 (H) 08/19/2020     Lab Results   Component Value Date    FERRITIN 269.5 08/20/2020    FERRITIN 249.7 08/19/2020       PROBLEM LIST:             1. Diffuse Large B-Cell Lymphoma  2. H/o PE  3. DVT      TREATMENT:             1. R-CHOP x 6 cycles (1/10/2019-4/24/2019)  2. USOR 86570 - lymphodepleting chemotherapy:  Fludarabine and cyclophosphamide (8/10/2020-8/12/2020) f/b CAR-T 8/14/2020     ASSESSMENT AND PLAN:           1.  Recurrent NHL  - PET/CT (6/30/20): right paraortic mass, SUV 5.2.   - Plan: lympho-depleting chemo (Fludarabine/Cyclophosphamide, 8/10-8/12/20) followed by CAR-T infusion (8/14/20) per clinical trial.      Day +6 of CAR-T infusion   Lymphodepleting Chemotherapy: Fludarabine and cyclophosphamide  Disease Status at time of Infusion: Relapsed  CAR-T Infusion Date: 8/14/20  CAR-T Product:  Lisocabtagene Maraleucel ([id number])    2. CRS / Neuro:  No evidence  - Neuro checks w/ CARTOX 10-point assessment Q4hrs  - Cont Keppra 500mg BID (8/10/20)   - Consult neurology on admission  1. Obtain MRI T/L spine w/wo to evaluate for myelopathy or cauda equina compression - If any lesion found on MRI imaging, recommend team obtain stat neurosurgery consultation. 2. Obtain vascular medicine/surgery consultation given history of femoral DVTs. 3. Obtain inflammatory workup including TSH, vitamin B12, thiamine, folate, methylmalonic acid, VDRL, HIV, and serum paraneoplastic ([facility]) panel. 4. If above workup normal would advise EMG of both legs when able. - MRI T/L/S spine (8/19/20): Focal T2 hyperintense intramedullary signal at T7 suggestive of transverse myelitis. Multilevel degenerative disc disease.  - LP (8/20/20): ordered - routine CSF studies including cytology/flow as well as viral, fungal, & bacterial cultures  - Monitor CRP and Ferrtin closely   Lab Results   Component Value Date    CRP 7.8 (H) 08/20/2020    CRP 7.0 (H) 08/19/2020     Lab Results   Component Value Date    FERRITIN 269.5 08/20/2020    FERRITIN 249.7 08/19/2020     Toxicity Grading  CRS Grade: Grade 0  ICANS Grade:  Grade 0  ICE Score:  CAR-T Score: 10    3. ID: febrile following admission - suspect possible CRS although cannot r/o systemic infection. No localizing symptoms. Now afebrile  - Start Diflucan, & Valtrex today  - Cont Zosyn Day +2 (8/19/20)  - Blood Cxs 8/19 - NGTD    4. Heme: Neutropenia & Anemia from chemotherapy   - Transfuse for Hgb < 7 and Platelets < 49R  - No transfusion today    5.   Metabolic: +Hyperglycemia  TLS:  No evidence, cont allopurinol and daily TLS labs   - Cont IVFs: NS at 50mL/hr  - Replace potassium and magnesium per PRN orders  - Start humalog Med SSI & accuchecks AC/HS  - Check HgbA1c    6. Nutrition:  Appetite and oral intake is good. - Cont low microbial diet   - Follow closely with dietary    7. Coagulation: H/o PE and DVT on chronic anticoagulation  - Hold eliquis for upcoming LP. Resume following LP  - No evidence of acute thrombosis at this time      - DVT Prophylaxis: Cont tx dose eliquis - resume following LP  Contraindications to pharmacologic prophylaxis: None  Contraindications to mechanical prophylaxis: None    - Disposition:  Uncertain at this time      CHIKIS Hussein - CNP      [facility]. Valentina Britton DO, MS  Oncology/Hematology Care    Please contact via:  1. Perfect Serve  2.   Cell Phone:  [phone]    8/20/2020   10:13 AM

## 2020-08-21 LAB
ALBUMIN SERPL-MCNC: 3.8 G/DL (ref 3.4–5)
ALP BLD-CCNC: 73 U/L (ref 40–129)
ALT SERPL-CCNC: 18 U/L (ref 10–40)
ANION GAP SERPL CALCULATED.3IONS-SCNC: 9 MMOL/L (ref 3–16)
ANISOCYTOSIS: ABNORMAL
APTT: 28.8 SEC (ref 24.2–36.2)
AST SERPL-CCNC: 19 U/L (ref 15–37)
ATYPICAL LYMPHOCYTE RELATIVE PERCENT: 4 % (ref 0–6)
BANDED NEUTROPHILS RELATIVE PERCENT: 1 % (ref 0–7)
BASOPHILS ABSOLUTE: 0 K/UL (ref 0–0.2)
BASOPHILS RELATIVE PERCENT: 0 %
BILIRUB SERPL-MCNC: 0.6 MG/DL (ref 0–1)
BILIRUBIN DIRECT: <0.2 MG/DL (ref 0–0.3)
BILIRUBIN, INDIRECT: ABNORMAL MG/DL (ref 0–1)
BUN BLDV-MCNC: 16 MG/DL (ref 7–20)
C-REACTIVE PROTEIN: 28.2 MG/L (ref 0–5.1)
CALCIUM SERPL-MCNC: 8.5 MG/DL (ref 8.3–10.6)
CHLORIDE BLD-SCNC: 100 MMOL/L (ref 99–110)
CO2: 27 MMOL/L (ref 21–32)
CREAT SERPL-MCNC: 1.1 MG/DL (ref 0.8–1.3)
D DIMER: <200 NG/ML DDU (ref 0–229)
EOSINOPHILS ABSOLUTE: 0.1 K/UL (ref 0–0.6)
EOSINOPHILS RELATIVE PERCENT: 5 %
ESTIMATED AVERAGE GLUCOSE: 203 MG/DL
FERRITIN: 284.1 NG/ML (ref 30–400)
FIBRINOGEN: 521 MG/DL (ref 200–397)
FINAL REPORT: NORMAL
FUNGUS STAIN: NORMAL
GFR AFRICAN AMERICAN: >60
GFR NON-AFRICAN AMERICAN: >60
GLUCOSE BLD-MCNC: 163 MG/DL (ref 70–99)
GLUCOSE BLD-MCNC: 175 MG/DL (ref 70–99)
GLUCOSE BLD-MCNC: 177 MG/DL (ref 70–99)
GLUCOSE BLD-MCNC: 178 MG/DL (ref 70–99)
GLUCOSE BLD-MCNC: 184 MG/DL (ref 70–99)
HBA1C MFR BLD: 8.7 %
HCT VFR BLD CALC: 34.3 % (ref 40.5–52.5)
HEMOGLOBIN: 11.9 G/DL (ref 13.5–17.5)
INR BLD: 1.01 (ref 0.86–1.14)
LACTATE DEHYDROGENASE: 143 U/L (ref 100–190)
LYMPHOCYTES ABSOLUTE: 0.6 K/UL (ref 1–5.1)
LYMPHOCYTES RELATIVE PERCENT: 34 %
MACROCYTES: ABNORMAL
MAGNESIUM: 2 MG/DL (ref 1.8–2.4)
MCH RBC QN AUTO: 34 PG (ref 26–34)
MCHC RBC AUTO-ENTMCNC: 34.8 G/DL (ref 31–36)
MCV RBC AUTO: 97.9 FL (ref 80–100)
METHYLMALONIC ACID: 0.12 UMOL/L (ref 0–0.4)
MONOCYTES ABSOLUTE: 0.3 K/UL (ref 0–1.3)
MONOCYTES RELATIVE PERCENT: 22 %
NEUTROPHILS ABSOLUTE: 0.5 K/UL (ref 1.7–7.7)
NEUTROPHILS RELATIVE PERCENT: 34 %
ORGANISM: ABNORMAL
PDW BLD-RTO: 13.1 % (ref 12.4–15.4)
PERFORMED ON: ABNORMAL
PHOSPHORUS: 3.5 MG/DL (ref 2.5–4.9)
PLATELET # BLD: 150 K/UL (ref 135–450)
PMV BLD AUTO: 8.6 FL (ref 5–10.5)
POTASSIUM SERPL-SCNC: 4.1 MMOL/L (ref 3.5–5.1)
PRELIMINARY: NORMAL
PROTHROMBIN TIME: 11.7 SEC (ref 10–13.2)
RBC # BLD: 3.51 M/UL (ref 4.2–5.9)
SODIUM BLD-SCNC: 136 MMOL/L (ref 136–145)
THROAT CULTURE: ABNORMAL
THROAT CULTURE: ABNORMAL
TOTAL PROTEIN: 6.3 G/DL (ref 6.4–8.2)
TRIGL SERPL-MCNC: 146 MG/DL (ref 0–150)
URIC ACID, SERUM: 2.5 MG/DL (ref 3.5–7.2)
WBC # BLD: 1.5 K/UL (ref 4–11)

## 2020-08-21 PROCEDURE — 6370000000 HC RX 637 (ALT 250 FOR IP): Performed by: PHYSICIAN ASSISTANT

## 2020-08-21 PROCEDURE — 83615 LACTATE (LD) (LDH) ENZYME: CPT

## 2020-08-21 PROCEDURE — 6360000002 HC RX W HCPCS: Performed by: INTERNAL MEDICINE

## 2020-08-21 PROCEDURE — 85610 PROTHROMBIN TIME: CPT

## 2020-08-21 PROCEDURE — 82728 ASSAY OF FERRITIN: CPT

## 2020-08-21 PROCEDURE — 80076 HEPATIC FUNCTION PANEL: CPT

## 2020-08-21 PROCEDURE — 84478 ASSAY OF TRIGLYCERIDES: CPT

## 2020-08-21 PROCEDURE — 2580000003 HC RX 258: Performed by: INTERNAL MEDICINE

## 2020-08-21 PROCEDURE — 6370000000 HC RX 637 (ALT 250 FOR IP): Performed by: NURSE PRACTITIONER

## 2020-08-21 PROCEDURE — 85730 THROMBOPLASTIN TIME PARTIAL: CPT

## 2020-08-21 PROCEDURE — 84550 ASSAY OF BLOOD/URIC ACID: CPT

## 2020-08-21 PROCEDURE — 86140 C-REACTIVE PROTEIN: CPT

## 2020-08-21 PROCEDURE — 83735 ASSAY OF MAGNESIUM: CPT

## 2020-08-21 PROCEDURE — 2580000003 HC RX 258: Performed by: PHYSICIAN ASSISTANT

## 2020-08-21 PROCEDURE — 80048 BASIC METABOLIC PNL TOTAL CA: CPT

## 2020-08-21 PROCEDURE — 82232 ASSAY OF BETA-2 PROTEIN: CPT

## 2020-08-21 PROCEDURE — 2060000000 HC ICU INTERMEDIATE R&B

## 2020-08-21 PROCEDURE — 88112 CYTOPATH CELL ENHANCE TECH: CPT

## 2020-08-21 PROCEDURE — 85379 FIBRIN DEGRADATION QUANT: CPT

## 2020-08-21 PROCEDURE — 85025 COMPLETE CBC W/AUTO DIFF WBC: CPT

## 2020-08-21 PROCEDURE — 84100 ASSAY OF PHOSPHORUS: CPT

## 2020-08-21 PROCEDURE — 85384 FIBRINOGEN ACTIVITY: CPT

## 2020-08-21 RX ADMIN — Medication 500 MG: at 09:39

## 2020-08-21 RX ADMIN — VALACYCLOVIR HYDROCHLORIDE 500 MG: 500 TABLET, FILM COATED ORAL at 09:35

## 2020-08-21 RX ADMIN — SODIUM CHLORIDE 15 ML: 900 IRRIGANT IRRIGATION at 12:00

## 2020-08-21 RX ADMIN — LEVETIRACETAM 500 MG: 500 TABLET, FILM COATED ORAL at 09:35

## 2020-08-21 RX ADMIN — MULTIPLE VITAMINS W/ MINERALS TAB 1 TABLET: TAB at 09:34

## 2020-08-21 RX ADMIN — Medication 10 ML: at 09:38

## 2020-08-21 RX ADMIN — INSULIN LISPRO 2 UNITS: 100 INJECTION, SOLUTION INTRAVENOUS; SUBCUTANEOUS at 07:19

## 2020-08-21 RX ADMIN — PIPERACILLIN SODIUM AND TAZOBACTAM SODIUM 4.5 G: 4; .5 INJECTION, POWDER, LYOPHILIZED, FOR SOLUTION INTRAVENOUS at 21:19

## 2020-08-21 RX ADMIN — VALACYCLOVIR HYDROCHLORIDE 500 MG: 500 TABLET, FILM COATED ORAL at 21:19

## 2020-08-21 RX ADMIN — APIXABAN 2.5 MG: 5 TABLET, FILM COATED ORAL at 09:34

## 2020-08-21 RX ADMIN — Medication 10 ML: at 21:20

## 2020-08-21 RX ADMIN — INSULIN LISPRO 1 UNITS: 100 INJECTION, SOLUTION INTRAVENOUS; SUBCUTANEOUS at 21:20

## 2020-08-21 RX ADMIN — FLUCONAZOLE 200 MG: 200 TABLET ORAL at 09:34

## 2020-08-21 RX ADMIN — PIPERACILLIN SODIUM AND TAZOBACTAM SODIUM 4.5 G: 4; .5 INJECTION, POWDER, LYOPHILIZED, FOR SOLUTION INTRAVENOUS at 15:43

## 2020-08-21 RX ADMIN — LEVETIRACETAM 500 MG: 500 TABLET, FILM COATED ORAL at 21:20

## 2020-08-21 RX ADMIN — INSULIN LISPRO 2 UNITS: 100 INJECTION, SOLUTION INTRAVENOUS; SUBCUTANEOUS at 11:34

## 2020-08-21 RX ADMIN — PIPERACILLIN SODIUM AND TAZOBACTAM SODIUM 4.5 G: 4; .5 INJECTION, POWDER, LYOPHILIZED, FOR SOLUTION INTRAVENOUS at 04:00

## 2020-08-21 RX ADMIN — INSULIN LISPRO 2 UNITS: 100 INJECTION, SOLUTION INTRAVENOUS; SUBCUTANEOUS at 17:18

## 2020-08-21 RX ADMIN — APIXABAN 2.5 MG: 5 TABLET, FILM COATED ORAL at 21:19

## 2020-08-21 RX ADMIN — PIPERACILLIN SODIUM AND TAZOBACTAM SODIUM 4.5 G: 4; .5 INJECTION, POWDER, LYOPHILIZED, FOR SOLUTION INTRAVENOUS at 09:34

## 2020-08-21 ASSESSMENT — PAIN SCALES - GENERAL
PAINLEVEL_OUTOF10: 0

## 2020-08-21 NOTE — PROGRESS NOTES
[address] [facility] History and Physical      2020    Nestor Mckeon    :  1940    MRN:  [id number]    Referring MD: Felicitas Bravo MD  [address] [facility] [address], [place] [address] [address], [address]      Subjective:  No acute issues - LE symptoms improving; occ febrile; H influ isolated from throat     ECOG PS:  (3) Capable of limited self-care, confined to bed or chair > 50% of waking hours    KPS: 60% Requires occasional assistance, but is able to care for most of his personal needs    Isolation:  None     Medications    Scheduled Meds:   fluconazole  200 mg Oral Daily    valACYclovir  500 mg Oral BID    insulin lispro  0-12 Units Subcutaneous TID WC    insulin lispro  0-6 Units Subcutaneous Nightly    [Held by provider] apixaban  2.5 mg Oral BID    therapeutic multivitamin-minerals  1 tablet Oral Daily    vitamin C  500 mg Oral Daily    levETIRAcetam  500 mg Oral BID    sodium chloride flush  10 mL Intravenous 2 times per day    Saline Mouthwash  15 mL Swish & Spit 4x Daily AC & HS    piperacillin-tazobactam  4.5 g Intravenous Q6H     Continuous Infusions:   sodium chloride 50 mL/hr at 20 2153    dextrose      sodium chloride      potassium chloride       PRN Meds:.glucose, dextrose, glucagon (rDNA), dextrose, oxyCODONE, ondansetron, sodium chloride, sodium chloride flush, potassium chloride, magnesium sulfate, magnesium hydroxide, Saline Mouthwash, alteplase, acetaminophen, traMADol      ROS:  As noted above, otherwise remainder of 10-point ROS negative      Physical Exam:     Vital Signs:  /61   Pulse 66   Temp 98.6 °F (37 °C) (Oral)   Resp 16   Ht 5' 11\" (1.803 m)   Wt 253 lb (114.8 kg)   SpO2 97%   BMI 35.29 kg/m²     Weight:    Wt Readings from Last 3 Encounters:   20 253 lb (114.8 kg)       General: Awake, alert and oriented.   HEENT: normocephalic, alopecia, PERRL, no scleral erythema or icterus, Oral mucosa moist and intact, throat clear  NECK: supple without palpable adenopathy  BACK: Straight negative CVAT  SKIN: warm dry and intact without lesions rashes or masses  CHEST: CTA bilaterally without use of accessory muscles  CV: Normal S1 S2, RRR, no MRG  ABD: NT ND normoactive BS, no palpable masses or hepatosplenomegaly  EXTREMITIES: without edema, denies calf tenderness  NEURO: CN II - XII grossly intact; strength 4/5 in BLE except 5/5 plantar and dorsiflexioin at the ankles, sensation in the LE intact, finger to dose intact as well as upper body strength. Walks with unsteady wide based gait which is new. CATHETER: Right chest port     Laboratory Data:  CBC:   Recent Labs     08/19/20  0340 08/20/20  0410 08/21/20  0406   WBC 3.2* 1.6* 1.5*   HGB 12.6* 12.3* 11.9*   HCT 36.7* 35.5* 34.3*   MCV 98.6 96.7 97.9    137 150     BMP/Mag:  Recent Labs     08/18/20  2118 08/19/20  0340 08/20/20  0410 08/21/20  0406   * 135* 134* 136   K 4.1 4.3 4.1 4.1   CL 98* 98* 99 100   CO2 25 25 26 27   PHOS 2.5 3.2  --  3.5   BUN 19 19 17 16   CREATININE 1.0 1.1 1.2 1.1   MG  --  1.90  --  2.00     LIVP:   Recent Labs     08/19/20  0340 08/20/20  0410 08/21/20  0406   AST 20 20 19   ALT 19 18 18   BILIDIR <0.2 <0.2 <0.2   BILITOT 0.6 0.6 0.6   ALKPHOS 72 68 73     Coags:   Recent Labs     08/19/20  0340 08/21/20  0406   PROTIME 11.5 11.7   INR 0.99 1.01   APTT 30.4 28.8     Uric Acid   Recent Labs     08/18/20  1736 08/19/20  0340 08/21/20  0406   LABURIC 4.6 3.7 2.5*     Lab Results   Component Value Date    CRP 28.2 (H) 08/21/2020    CRP 7.8 (H) 08/20/2020    CRP 7.0 (H) 08/19/2020     Lab Results   Component Value Date    FERRITIN 284.1 08/21/2020    FERRITIN 269.5 08/20/2020    FERRITIN 249.7 08/19/2020       PROBLEM LIST:             1. Diffuse Large B-Cell Lymphoma  2. H/o PE  3. DVT      TREATMENT:             1. R-CHOP x 6 cycles (1/10/2019-4/24/2019)  2.  OR 82132 - lymphodepleting chemotherapy:  Fludarabine and cyclophosphamide (8/10/2020-8/12/2020) f/b CAR-T 8/14/2020     ASSESSMENT AND PLAN:           1. Recurrent NHL  - PET/CT (6/30/20): right paraortic mass, SUV 5.2.   - Plan: lympho-depleting chemo (Fludarabine/Cyclophosphamide, 8/10-8/12/20) followed by CAR-T infusion (8/14/20) per clinical trial.      Day +7  Lymphodepleting Chemotherapy: Fludarabine and cyclophosphamide  Disease Status at time of Infusion: Relapsed  CAR-T Infusion Date: 8/14/20  CAR-T Product:  Lisocabtagenkelvin Holtucel ([id number])    2. CRS / Neuro:  No evidence  - Neuro checks w/ CARTOX 10-point assessment Q4hrs  - Cont Keppra 500mg BID (8/10/20)   - Consult neurology on admission  - If above workup normal would advise EMG of both legs when able  - The patient should follow up with Neurology as an outpatient after discharge, would advise with Dr. Bharti Jorgensen or Dr. Juan Bella if an option  - MRI T/L/S spine (8/19/20): Focal T2 hyperintense intramedullary signal at T7 suggestive of transverse myelitis. Multilevel degenerative disc disease.  - LP (8/20/20): CSF is bloody w/slightly elevated protein & WBCs. Otherwise cytology/flow as well as viral, fungal, & bacterial cultures all pending  - Monitor CRP and Ferrtin closely - continuing to rise  Lab Results   Component Value Date    CRP 28.2 (H) 08/21/2020    CRP 7.8 (H) 08/20/2020    CRP 7.0 (H) 08/19/2020     Lab Results   Component Value Date    FERRITIN 284.1 08/21/2020    FERRITIN 269.5 08/20/2020    FERRITIN 249.7 08/19/2020     Toxicity Grading  CRS Grade: Grade 0  ICANS Grade:  Grade 0  ICE Score:  CAR-T Score: 10    3. ID: febrile following admission - suspect possible CRS although cannot r/o systemic infection. No localizing symptoms. Now afebrile  - Cont Diflucan & Valtrex ppx while neutropenic  - Cont Zosyn Day +3 (8/19/20)  - Blood Cxs 8/19 - NGTD    4. Heme: Neutropenia & Anemia from chemotherapy   - Transfuse for Hgb < 7 and Platelets < 45O  - No transfusion today    5.   Metabolic: +Hyperglycemia  TLS:  No evidence, cont allopurinol and daily TLS labs   - D/c IVFs  - Replace potassium and magnesium per PRN orders  - Cont humalog Med SSI & accuchecks AC/HS  - Check HgbA1c 8/19 - 8.6 c/w T2DM  - Consult dietitian for DM diet education  - Will need f/u with endocrinology once outpt    6. Nutrition: Appetite and oral intake is good. - Cont low microbial diet   - Follow closely with dietary    7. Coagulation: H/o PE and DVT on chronic anticoagulation  - Resume eliquis  - No evidence of acute thrombosis at this time      - DVT Prophylaxis: Cont tx dose eliquis   Contraindications to pharmacologic prophylaxis: None  Contraindications to mechanical prophylaxis: None    - Disposition:  Uncertain at this time      CHIKIS Dominguez - [facility]. Alec Lombardo DO, MS  Oncology/Hematology Care    Please contact via:  1. [employer] Serve  2.   Cell Phone:  [phone]    8/21/2020   7:17 AM

## 2020-08-21 NOTE — PLAN OF CARE
Problem: Falls - Risk of:  Goal: Will remain free from falls  Description: Will remain free from falls  Note: Orthostatic vital signs obtained at start of shift - see flowsheet for details. Pt does not meet criteria for orthostasis. Pt is a Med fall risk. See Cooper Best Fall Score and ABCDS Injury Risk assessments. - Screening for Orthostasis AND not a [place] Risk per VALENCIA/ABCDS: Pt bed is in low position, side rails up, call light and belongings are in reach. Fall risk light is on outside pts room. Pt encouraged to call for assistance as needed. Will continue with hourly rounds for PO intake, pain needs, toileting and repositioning as needed. Problem: PROTECTIVE PRECAUTIONS  Goal: Patient will remain free of nosocomial Infections  Description: Pt currently in a private, positive pressure room. Educated pt on wearing a mask when neutropenic and/or leaving the floor. No living plants or flowers allowed. Also reinforced importance of hand hygiene. Pt following a low microbial diet. Surfaces throughout room cleaned with bleach wipes per unit policy. Note: Pt remains in protective precautions. Pt educated on wearing mask when in hallways. Pt, staff, and visitors adhering to handwashing guidelines. Pt educated to shower or bathe daily with chlorhexidine and linens changed daily per protocol. Pt verbalizes understanding of low microbial diet. Will continue to monitor. Problem: Bleeding:  Goal: Will show no signs and symptoms of excessive bleeding  Description: Will show no signs and symptoms of excessive bleeding  Note: Patient's hemoglobin this AM:   Recent Labs     08/21/20  0406   HGB 11.9*     Patient's platelet count this AM:   Recent Labs     08/21/20  0406       Thrombocytopenia Precautions in place. Patient showing no signs or symptoms of active bleeding. Transfusion not indicated at this time. Patient verbalizes understanding of all instructions.  Will

## 2020-08-21 NOTE — PROGRESS NOTES
Physician Progress Note      PATIENT:               Sofy Felix  CSN #:                  [id number]  :                       1940  ADMIT DATE:       2020 2:53 PM  [facility] DATE:  RESPONDING  PROVIDER #:        Shama Soler CNP          QUERY TEXT:    Dear Attending Provider,    Patient admitted with BLE numbness, s/p CAR-T . Noted to have Multilevel   neural foraminal stenosis T10-11, and severe thecal sac narrowing at the level   of L4-L5. If possible, please document in progress notes and discharge   summary if you are evaluating and/or treating any of the following: The medical record reflects the following:  Risk Factors: Thecal sac narrowing,  foraminal stenosis, CAR-T   Clinical Indicators: LP results negative, Per Neurosurgery \"MRI revealing DDD   with thecal sac narrowing at L4-5\"; Per attending notes \"suspect possible CRS   although cannot r/o systemic infection; ICE Score:  CAR-T Score: 10\"  Treatment: Imaging w/u, LP, Zosyn  Options provided:  -- BLE numbness due to DDD with thoracic and lumbar spinal stenosis  -- BLE numbness due CRS  -- Other - I will add my own diagnosis  -- Disagree - Not applicable / Not valid  -- Disagree - Clinically unable to determine / Unknown  -- Refer to Clinical Documentation Reviewer    PROVIDER RESPONSE TEXT:    This patient has BLE numbness due to DDD with thoracic and lumbar spinal   stenosis. Query created by:  Prakash Erickson on 2020 2:13 PM      Electronically signed by:  Shama Soler CNP 2020 2:54 PM

## 2020-08-21 NOTE — PROGRESS NOTES
Neurology Progress Note  Seen for lower extremity numbness and weakness     Updates:  No major changes     ROS:   Denies bladder incontinence or difficulty voiding  Denies weakness  Denies saddle anesthesia  Denies new numbness  No nausea or vomiting       Exam:  Blood pressure (!) 149/79, pulse 80, temperature 98 °F (36.7 °C), temperature source Oral, resp. rate 18, height 5' 11\" (1.803 m), weight 252 lb 12.8 oz (114.7 kg), SpO2 97 %. Constitutional                          Vital signs: BP, HR, and RR reviewed            General Alert, no distress, well-nourished  Eyes: Unable to visualize the fundi  Cardiovascular: pulses symmetric in all 4 extremities.  No peripheral edema. Psychiatric: cooperative with examination, no  psychotic behavior noted. Neurologic  Mental status: Eyes open spontaneously   orientation to person, place, month, year              General fund of knowledge grossly intact              Memory grossly intact              Attention intact as able to attend well to the exam                Language fluent in conversation              Comprehension intact; follows simple commands  Cranial nerves:   CN2: Visual Fields full w/o extinction on confrontational testing  CN 3,4,6: extraocular muscles intact  CN5: facial sensation symmetric   CN7:face symmetric without dysarthria  CN8: hearing grossly intact  CN9: palate elevated symmetrically  CN11: trap full strength on shoulder shrug  CN12: tongue midline with protrusion  Strength: 5/5 strength in all 4 extremities   Cerebellar/coordination: finger nose finger normal without ataxia  Tone: normal in all 4 extremities  Gait: normal gait        Labs:  CK: 56  CRP: 7.8  Folate: >20.00  B12: 357  A1C: 8.6  TSH: 1.59    CSF:   Ref.  Range 8/20/2020 12:04   Appearance, CSF Unknown Bloody (A)   Clot Evaluation CSF Unknown see below   Glucose, CSF Latest Ref Range: 40 - 80 mg/dL 104 (H)   No differential CSF Unknown see below   Protein, CSF Latest Ref Range: 15 - 45 mg/dL 68 (H)   RBC, CSF Latest Ref Range: 0 /cumm 1145 (A)   WBC, CSF Latest Ref Range: 0 - 5 /cumm 4   Color, CSF Latest Ref Range: Colorless  Colorless   Tube Number + CELL CT + DIFF-CSF Unknown Tube 1        Studies:  MRI T and L spine radiology read  Thoracic spine         1. Focal T2 hyperintense intramedullary signal at T7 suggestive of transverse myelitis. 2. Multilevel degenerative disc disease as above. 3. Multilevel neural foraminal stenosis, most pronounced at left T10-T11.              Lumbar spine         1. Multilevel degenerative disc disease as detailed above, most pronounced at L4-5 where there is moderate to severe thecal sac narrowing. 2. Multilevel mild neural foraminal stenosis.                 Impression:  1. Bilateral lower extremity weakness  2. Bilateral lower extremity numbness  3. Non-Hodgkin's Lymphoma  4. History of bilateral femoral DVT  5. Diabetes        Mike Patel is a [de-identified] y. o. male who presented with abrupt onset BLE numbness and subjective BLE weakness in the setting of recent CAR-T therapy and Non-Hodgkin's Lymphoma.      Per radiology read, there is Focal T2 hyperintense intramedullary signal at T7 suggestive of transverse myelitis, however the patient's current clinical picture does not match this radiographic finding. Imaging also revealed severe thecal sac narrowing at the level of L4-L5. Per neurosurgery, no surgical intervention indicated. CSF consistent with a traumatic tap. Protein is mildly elevated even when taking into account increased amount of red blood cells. This is a nonspecific finding and can be seen in the setting of disc disease and diabetes.  CSF reassuring against infection.      Patient remains at his baseline.      Recommendations:  - Await pending serum and CSF studies  - PT/OT, rehab as able  - If above workup normal would advise EMG of both legs when able  - The patient should follow up with Neurology as an outpatient after

## 2020-08-22 LAB
ALBUMIN SERPL-MCNC: 3.8 G/DL (ref 3.4–5)
ALP BLD-CCNC: 70 U/L (ref 40–129)
ALT SERPL-CCNC: 16 U/L (ref 10–40)
ANION GAP SERPL CALCULATED.3IONS-SCNC: 8 MMOL/L (ref 3–16)
AST SERPL-CCNC: 17 U/L (ref 15–37)
BASOPHILS ABSOLUTE: 0 K/UL (ref 0–0.2)
BASOPHILS RELATIVE PERCENT: 0 %
BETA-2 MICROGLOBULIN: 2.7 MG/L (ref 1.1–2.4)
BILIRUB SERPL-MCNC: 0.6 MG/DL (ref 0–1)
BILIRUBIN DIRECT: <0.2 MG/DL (ref 0–0.3)
BILIRUBIN, INDIRECT: ABNORMAL MG/DL (ref 0–1)
BLOOD CULTURE, ROUTINE: NORMAL
BUN BLDV-MCNC: 15 MG/DL (ref 7–20)
C-REACTIVE PROTEIN: 16.8 MG/L (ref 0–5.1)
CALCIUM SERPL-MCNC: 8.6 MG/DL (ref 8.3–10.6)
CHLORIDE BLD-SCNC: 102 MMOL/L (ref 99–110)
CO2: 27 MMOL/L (ref 21–32)
CREAT SERPL-MCNC: 1 MG/DL (ref 0.8–1.3)
CULTURE, BLOOD 2: NORMAL
EOSINOPHILS ABSOLUTE: 0.1 K/UL (ref 0–0.6)
EOSINOPHILS RELATIVE PERCENT: 3 %
FERRITIN: 241.7 NG/ML (ref 30–400)
GFR AFRICAN AMERICAN: >60
GFR NON-AFRICAN AMERICAN: >60
GLUCOSE BLD-MCNC: 122 MG/DL (ref 70–99)
GLUCOSE BLD-MCNC: 156 MG/DL (ref 70–99)
GLUCOSE BLD-MCNC: 179 MG/DL (ref 70–99)
GLUCOSE BLD-MCNC: 180 MG/DL (ref 70–99)
GLUCOSE BLD-MCNC: 366 MG/DL (ref 70–99)
HCT VFR BLD CALC: 34 % (ref 40.5–52.5)
HEMOGLOBIN: 11.7 G/DL (ref 13.5–17.5)
LYMPHOCYTES ABSOLUTE: 0.7 K/UL (ref 1–5.1)
LYMPHOCYTES RELATIVE PERCENT: 41 %
MCH RBC QN AUTO: 33.8 PG (ref 26–34)
MCHC RBC AUTO-ENTMCNC: 34.4 G/DL (ref 31–36)
MCV RBC AUTO: 98.3 FL (ref 80–100)
MONOCYTES ABSOLUTE: 0.5 K/UL (ref 0–1.3)
MONOCYTES RELATIVE PERCENT: 31 %
NEUTROPHILS ABSOLUTE: 0.4 K/UL (ref 1.7–7.7)
NEUTROPHILS RELATIVE PERCENT: 25 %
PDW BLD-RTO: 13.2 % (ref 12.4–15.4)
PERFORMED ON: ABNORMAL
PLATELET # BLD: 162 K/UL (ref 135–450)
PMV BLD AUTO: 8.1 FL (ref 5–10.5)
POTASSIUM SERPL-SCNC: 4 MMOL/L (ref 3.5–5.1)
RBC # BLD: 3.46 M/UL (ref 4.2–5.9)
RBC # BLD: NORMAL 10*6/UL
SODIUM BLD-SCNC: 137 MMOL/L (ref 136–145)
TOTAL PROTEIN: 6.2 G/DL (ref 6.4–8.2)
VITAMIN B1 WHOLE BLOOD: 91 NMOL/L (ref 70–180)
WBC # BLD: 1.7 K/UL (ref 4–11)

## 2020-08-22 PROCEDURE — 82728 ASSAY OF FERRITIN: CPT

## 2020-08-22 PROCEDURE — 80076 HEPATIC FUNCTION PANEL: CPT

## 2020-08-22 PROCEDURE — 6370000000 HC RX 637 (ALT 250 FOR IP): Performed by: PHYSICIAN ASSISTANT

## 2020-08-22 PROCEDURE — 6360000002 HC RX W HCPCS: Performed by: INTERNAL MEDICINE

## 2020-08-22 PROCEDURE — 6370000000 HC RX 637 (ALT 250 FOR IP): Performed by: NURSE PRACTITIONER

## 2020-08-22 PROCEDURE — 80048 BASIC METABOLIC PNL TOTAL CA: CPT

## 2020-08-22 PROCEDURE — 2580000003 HC RX 258: Performed by: INTERNAL MEDICINE

## 2020-08-22 PROCEDURE — 2580000003 HC RX 258: Performed by: PHYSICIAN ASSISTANT

## 2020-08-22 PROCEDURE — 94760 N-INVAS EAR/PLS OXIMETRY 1: CPT

## 2020-08-22 PROCEDURE — 36591 DRAW BLOOD OFF VENOUS DEVICE: CPT

## 2020-08-22 PROCEDURE — 86140 C-REACTIVE PROTEIN: CPT

## 2020-08-22 PROCEDURE — 2060000000 HC ICU INTERMEDIATE R&B

## 2020-08-22 PROCEDURE — 85025 COMPLETE CBC W/AUTO DIFF WBC: CPT

## 2020-08-22 RX ADMIN — APIXABAN 2.5 MG: 5 TABLET, FILM COATED ORAL at 20:24

## 2020-08-22 RX ADMIN — Medication 500 MG: at 08:36

## 2020-08-22 RX ADMIN — VALACYCLOVIR HYDROCHLORIDE 500 MG: 500 TABLET, FILM COATED ORAL at 08:36

## 2020-08-22 RX ADMIN — PIPERACILLIN SODIUM AND TAZOBACTAM SODIUM 4.5 G: 4; .5 INJECTION, POWDER, LYOPHILIZED, FOR SOLUTION INTRAVENOUS at 20:24

## 2020-08-22 RX ADMIN — Medication 10 ML: at 08:37

## 2020-08-22 RX ADMIN — MULTIPLE VITAMINS W/ MINERALS TAB 1 TABLET: TAB at 08:36

## 2020-08-22 RX ADMIN — INSULIN LISPRO 2 UNITS: 100 INJECTION, SOLUTION INTRAVENOUS; SUBCUTANEOUS at 07:00

## 2020-08-22 RX ADMIN — INSULIN LISPRO 10 UNITS: 100 INJECTION, SOLUTION INTRAVENOUS; SUBCUTANEOUS at 16:28

## 2020-08-22 RX ADMIN — PIPERACILLIN SODIUM AND TAZOBACTAM SODIUM 4.5 G: 4; .5 INJECTION, POWDER, LYOPHILIZED, FOR SOLUTION INTRAVENOUS at 03:59

## 2020-08-22 RX ADMIN — INSULIN LISPRO 1 UNITS: 100 INJECTION, SOLUTION INTRAVENOUS; SUBCUTANEOUS at 20:25

## 2020-08-22 RX ADMIN — VALACYCLOVIR HYDROCHLORIDE 500 MG: 500 TABLET, FILM COATED ORAL at 20:25

## 2020-08-22 RX ADMIN — PIPERACILLIN SODIUM AND TAZOBACTAM SODIUM 4.5 G: 4; .5 INJECTION, POWDER, LYOPHILIZED, FOR SOLUTION INTRAVENOUS at 08:40

## 2020-08-22 RX ADMIN — LEVETIRACETAM 500 MG: 500 TABLET, FILM COATED ORAL at 08:36

## 2020-08-22 RX ADMIN — PIPERACILLIN SODIUM AND TAZOBACTAM SODIUM 4.5 G: 4; .5 INJECTION, POWDER, LYOPHILIZED, FOR SOLUTION INTRAVENOUS at 15:10

## 2020-08-22 RX ADMIN — LEVETIRACETAM 500 MG: 500 TABLET, FILM COATED ORAL at 20:25

## 2020-08-22 RX ADMIN — FLUCONAZOLE 200 MG: 200 TABLET ORAL at 08:36

## 2020-08-22 RX ADMIN — Medication 10 ML: at 20:29

## 2020-08-22 RX ADMIN — APIXABAN 2.5 MG: 5 TABLET, FILM COATED ORAL at 08:36

## 2020-08-22 ASSESSMENT — PAIN SCALES - GENERAL
PAINLEVEL_OUTOF10: 0

## 2020-08-22 NOTE — PROGRESS NOTES
[facility] History and Physical      2020    Nikhil Issa    :  1940    MRN:  [id number]    Referring MD: Sarbjit Villar MD  [address] [place] [address], [address]      Subjective: In chair - off bed alarm today, no further fevers     ECOG PS:  (3) Capable of limited self-care, confined to bed or chair > 50% of waking hours    KPS: 60% Requires occasional assistance, but is able to care for most of his personal needs    Isolation:  None     Medications    Scheduled Meds:   fluconazole  200 mg Oral Daily    valACYclovir  500 mg Oral BID    insulin lispro  0-12 Units Subcutaneous TID WC    insulin lispro  0-6 Units Subcutaneous Nightly    apixaban  2.5 mg Oral BID    therapeutic multivitamin-minerals  1 tablet Oral Daily    vitamin C  500 mg Oral Daily    levETIRAcetam  500 mg Oral BID    sodium chloride flush  10 mL Intravenous 2 times per day    Saline Mouthwash  15 mL Swish & Spit 4x Daily AC & HS    piperacillin-tazobactam  4.5 g Intravenous Q6H     Continuous Infusions:   dextrose      sodium chloride      potassium chloride       PRN Meds:.glucose, dextrose, glucagon (rDNA), dextrose, oxyCODONE, ondansetron, sodium chloride, sodium chloride flush, potassium chloride, magnesium sulfate, magnesium hydroxide, Saline Mouthwash, alteplase, acetaminophen, traMADol      ROS:  As noted above, otherwise remainder of 10-point ROS negative      Physical Exam:     Vital Signs:  /78   Pulse 84   Temp 97.7 °F (36.5 °C) (Oral)   Resp 18   Ht 5' 11\" (1.803 m)   Wt 250 lb (113.4 kg)   SpO2 97%   BMI 34.87 kg/m²     Weight:    Wt Readings from Last 3 Encounters:   20 250 lb (113.4 kg)       General: Awake, alert and oriented.   HEENT: normocephalic, alopecia, PERRL, no scleral erythema or icterus, Oral mucosa moist and intact, throat clear  NECK: supple without palpable adenopathy  BACK: Straight negative CVAT  SKIN: warm dry and intact without lesions rashes CAR-T infusion (8/14/20) per clinical trial.      Day +8  Lymphodepleting Chemotherapy: Fludarabine and cyclophosphamide  Disease Status at time of Infusion: Relapsed  CAR-T Infusion Date: 8/14/20  CAR-T Product:  Brendan Naik ([id number])    2. CRS / Neuro:  No evidence  - Neuro checks w/ CARTOX 10-point assessment Q4hrs  - Cont Keppra 500mg BID (8/10/20)   - Consult neurology on admission  - If above workup normal would advise EMG of both legs when able  - The patient should follow up with Neurology as an outpatient after discharge, would advise with Dr. Blaine Lyons or Dr. Hemal Burton if an option  - MRI T/L/S spine (8/19/20): Focal T2 hyperintense intramedullary signal at T7 suggestive of transverse myelitis. Multilevel degenerative disc disease.  - LP (8/20/20): CSF is bloody w/slightly elevated protein & WBCs. Otherwise cytology/flow as well as viral, fungal, & bacterial cultures all pending  - Monitor CRP and Ferrtin closely - continuing to rise  Lab Results   Component Value Date    CRP 16.8 (H) 08/22/2020    CRP 28.2 (H) 08/21/2020    CRP 7.8 (H) 08/20/2020     Lab Results   Component Value Date    FERRITIN 241.7 08/22/2020    FERRITIN 284.1 08/21/2020    FERRITIN 269.5 08/20/2020     Toxicity Grading  CRS Grade: Grade 0  ICANS Grade:  Grade 0  ICE Score:  CAR-T Score: 10    3. ID: febrile following admission - suspect possible CRS although cannot r/o systemic infection. No localizing symptoms. Now afebrile  - Cont Diflucan & Valtrex ppx while neutropenic  - Cont Zosyn Day +4 (8/19/20)  - Blood Cxs 8/19 - NGTD    4. Heme: Neutropenia & Anemia from chemotherapy   - Transfuse for Hgb < 7 and Platelets < 97C  - No transfusion today    5.   Metabolic: +Hyperglycemia  TLS:  No evidence, cont allopurinol and daily TLS labs   - D/c IVFs  - Replace potassium and magnesium per PRN orders  - Cont humalog Med SSI & accuchecks AC/HS  - Check HgbA1c 8/19 - 8.6 c/w T2DM  - Consult dietitian for DM diet

## 2020-08-22 NOTE — PLAN OF CARE
Problem: Falls - Risk of:  Goal: Will remain free from falls  Description: Will remain free from falls  8/22/2020 0554 by Sanju Cruz RN  Outcome: Ongoing  Note: Orthostatic vital signs obtained at start of shift - see flowsheet for details. Pt does not meet criteria for orthostasis. Pt is a Med fall risk. See Kandi Needs Fall Score and ABCDS Injury Risk assessments. - Screening for Orthostasis AND not a [place] Risk per VALENCIA/ABCDS: Pt bed is in low position, side rails up, call light and belongings are in reach. Fall risk light is on outside pts room. Pt encouraged to call for assistance as needed. Will continue with hourly rounds for PO intake, pain needs, toileting and repositioning as needed. Problem: PROTECTIVE PRECAUTIONS  Goal: Patient will remain free of nosocomial Infections  Description: Pt currently in a private, positive pressure room. Educated pt on wearing a mask when neutropenic and/or leaving the floor. No living plants or flowers allowed. Also reinforced importance of hand hygiene. Pt following a low microbial diet. Surfaces throughout room cleaned with bleach wipes per unit policy. 8/22/2020 0554 by Sanju Cruz RN  Outcome: Ongoing  Note: Pt remains in protective precautions. Pt educated on wearing mask when in hallways. Pt, staff, and visitors adhering to handwashing guidelines. Pt educated to shower or bathe daily with chlorhexidine and linens changed daily per protocol. Pt verbalizes understanding of low microbial diet. Will continue to monitor.        Problem: Bleeding:  Goal: Will show no signs and symptoms of excessive bleeding  Description: Will show no signs and symptoms of excessive bleeding  8/22/2020 0554 by Sanju Cruz RN  Outcome: Ongoing  Note: Patient's hemoglobin this AM:   Recent Labs     08/22/20  0405   HGB 11.7*     Patient's platelet count this AM:   Recent Labs     08/22/20  0405       Thrombocytopenia Precautions in place. Patient showing no signs or symptoms of active bleeding. Transfusion not indicated at this time. Patient verbalizes understanding of all instructions. Will continue to assess and implement POC. Call light within reach and hourly rounding in place. Problem: Pain:  Goal: Pain level will decrease  Description: Pt has not complained of pain during this shift. Will continue to monitor. 8/22/2020 0554 by Arlen Yi RN  Outcome: Ongoing  Note: Pt had no complaints of pain this shift, is using a heating pack on back. Problem: Venous Thromboembolism:  Goal: Will show no signs or symptoms of venous thromboembolism  Description: Will show no signs or symptoms of venous thromboembolism  Outcome: Ongoing  Note:   Refusing DVT Prevention: Pt is at risk for DVT d/t decreased mobility and cancer treatment. Pt educated on importance of activity. Pt has orders for SCDs while in bed, however pt currently refusing treatment. Reviewed risks of DVT & PE development while inpatient. Provider aware of patient's refusal and re-education of importance of prophylaxis. No new orders at this time. Will continue to re-instruct patient and intervene as appropriate. Problem: Discharge Planning:  Goal: Discharged to appropriate level of care  Description: Discharged to appropriate level of care  8/22/2020 0554 by Arlen Yi RN  Outcome: Ongoing  Note: Pt is up to date on plan of care, expected to discharge beginning of next week     Problem: Infection - Central Venous Catheter-Associated Bloodstream Infection:  Goal: Will show no infection signs and symptoms  Description: Will show no infection signs and symptoms  8/22/2020 0554 by Arlen Yi RN  Outcome: Ongoing  Note: CVC site remains free of signs/symptoms of infection. No drainage, edema, erythema, pain, or warmth noted at site. Dressing changes continue per protocol and on an as needed basis - see flowsheet.      Compliant with [facility] Bath Protocol:  Performed CHG bath today per [facility] protocol utilizing CHG solution in the shower. CVC site cleansed with CHG wipe over dressing, skin surrounding dressing, and first 6\" of IV tubing. Pt tolerated well. Continued to encourage daily CHG bathing per [facility] protocol.

## 2020-08-22 NOTE — FLOWSHEET NOTE
08/21/20 2009   Encounter Summary   Services provided to: Patient and family together   Referral/Consult From: Rounding   Continue Visiting   (es 8/21)   Complexity of Encounter Moderate   Length of Encounter 15 minutes   Routine   Type Initial   Assessment Approachable   Intervention Active listening;Prayer   Outcome Receptive;Engaged in conversation

## 2020-08-22 NOTE — PLAN OF CARE
all instructions. Will continue to assess and implement POC. Call light within reach and hourly rounding in place. Problem: Pain:  Goal: Pain level will decrease  Description: Pt has not complained of pain during this shift. Will continue to monitor. 8/22/2020 1827 by Sunny Gonzales RN  Outcome: Ongoing  Note: Patient did not state any pain during this shift. Problem: Venous Thromboembolism:  Goal: Will show no signs or symptoms of venous thromboembolism  Description: Will show no signs or symptoms of venous thromboembolism  8/22/2020 1827 by Sunny Gonzales RN  Outcome: Ongoing  Note: Adherent with DVT Prevention: Pt is at risk for DVT d/t decreased mobility and cancer treatment. Pt educated on importance of activity. Pt ambulated the halls during this shift. Pt verbalizes understanding of need for prophylaxis while inpatient. Problem: Infection - Central Venous Catheter-Associated Bloodstream Infection:  Goal: Will show no infection signs and symptoms  Description: Will show no infection signs and symptoms  8/22/2020 1827 by Sunny Gonzales RN  Outcome: Ongoing  Note: CVC site remains free of signs/symptoms of infection. No drainage, edema, erythema, pain, or warmth noted at site. Dressing changes continue per protocol and on an as needed basis - see flowsheet. Compliant with [facility] Bath Protocol:  Performed CHG bath today per [facility] protocol utilizing Bed bath with CHG wipes. CVC site cleansed with CHG wipe over dressing, skin surrounding dressing, and first 6\" of IV tubing. Pt tolerated well. Continued to encourage daily CHG bathing per [facility] protocol.      Electronically signed by Sunny Gonzales RN on 8/22/2020 at 6:31 PM

## 2020-08-23 LAB
ALBUMIN SERPL-MCNC: 3.9 G/DL (ref 3.4–5)
ALP BLD-CCNC: 63 U/L (ref 40–129)
ALT SERPL-CCNC: 15 U/L (ref 10–40)
ANION GAP SERPL CALCULATED.3IONS-SCNC: 7 MMOL/L (ref 3–16)
AST SERPL-CCNC: 18 U/L (ref 15–37)
ATYPICAL LYMPHOCYTE RELATIVE PERCENT: 1 % (ref 0–6)
BASOPHILS ABSOLUTE: 0 K/UL (ref 0–0.2)
BASOPHILS RELATIVE PERCENT: 0 %
BILIRUB SERPL-MCNC: 0.6 MG/DL (ref 0–1)
BILIRUBIN DIRECT: <0.2 MG/DL (ref 0–0.3)
BILIRUBIN, INDIRECT: ABNORMAL MG/DL (ref 0–1)
BUN BLDV-MCNC: 17 MG/DL (ref 7–20)
C-REACTIVE PROTEIN: 8.1 MG/L (ref 0–5.1)
CALCIUM SERPL-MCNC: 9 MG/DL (ref 8.3–10.6)
CHLORIDE BLD-SCNC: 102 MMOL/L (ref 99–110)
CO2: 27 MMOL/L (ref 21–32)
CREAT SERPL-MCNC: 1.2 MG/DL (ref 0.8–1.3)
EOSINOPHILS ABSOLUTE: 0.1 K/UL (ref 0–0.6)
EOSINOPHILS RELATIVE PERCENT: 7 %
FERRITIN: 241.4 NG/ML (ref 30–400)
GFR AFRICAN AMERICAN: >60
GFR NON-AFRICAN AMERICAN: 58
GLUCOSE BLD-MCNC: 134 MG/DL (ref 70–99)
GLUCOSE BLD-MCNC: 135 MG/DL (ref 70–99)
GLUCOSE BLD-MCNC: 138 MG/DL (ref 70–99)
GLUCOSE BLD-MCNC: 151 MG/DL (ref 70–99)
GLUCOSE BLD-MCNC: 160 MG/DL (ref 70–99)
HCT VFR BLD CALC: 34.7 % (ref 40.5–52.5)
HEMOGLOBIN: 11.8 G/DL (ref 13.5–17.5)
LYMPHOCYTES ABSOLUTE: 0.9 K/UL (ref 1–5.1)
LYMPHOCYTES RELATIVE PERCENT: 41 %
MCH RBC QN AUTO: 33.5 PG (ref 26–34)
MCHC RBC AUTO-ENTMCNC: 33.9 G/DL (ref 31–36)
MCV RBC AUTO: 98.8 FL (ref 80–100)
MONOCYTES ABSOLUTE: 0.4 K/UL (ref 0–1.3)
MONOCYTES RELATIVE PERCENT: 17 %
NEUTROPHILS ABSOLUTE: 0.7 K/UL (ref 1.7–7.7)
NEUTROPHILS RELATIVE PERCENT: 34 %
PDW BLD-RTO: 13.3 % (ref 12.4–15.4)
PERFORMED ON: ABNORMAL
PLATELET # BLD: 187 K/UL (ref 135–450)
PMV BLD AUTO: 8.3 FL (ref 5–10.5)
POTASSIUM SERPL-SCNC: 3.8 MMOL/L (ref 3.5–5.1)
RBC # BLD: 3.51 M/UL (ref 4.2–5.9)
RBC # BLD: NORMAL 10*6/UL
SODIUM BLD-SCNC: 136 MMOL/L (ref 136–145)
TOTAL PROTEIN: 6.3 G/DL (ref 6.4–8.2)
WBC # BLD: 2.1 K/UL (ref 4–11)

## 2020-08-23 PROCEDURE — 6360000002 HC RX W HCPCS: Performed by: INTERNAL MEDICINE

## 2020-08-23 PROCEDURE — 2060000000 HC ICU INTERMEDIATE R&B

## 2020-08-23 PROCEDURE — 2580000003 HC RX 258: Performed by: PHYSICIAN ASSISTANT

## 2020-08-23 PROCEDURE — 82728 ASSAY OF FERRITIN: CPT

## 2020-08-23 PROCEDURE — 6370000000 HC RX 637 (ALT 250 FOR IP): Performed by: PHYSICIAN ASSISTANT

## 2020-08-23 PROCEDURE — 6370000000 HC RX 637 (ALT 250 FOR IP): Performed by: NURSE PRACTITIONER

## 2020-08-23 PROCEDURE — 80076 HEPATIC FUNCTION PANEL: CPT

## 2020-08-23 PROCEDURE — 85025 COMPLETE CBC W/AUTO DIFF WBC: CPT

## 2020-08-23 PROCEDURE — 2580000003 HC RX 258: Performed by: INTERNAL MEDICINE

## 2020-08-23 PROCEDURE — 86140 C-REACTIVE PROTEIN: CPT

## 2020-08-23 PROCEDURE — 36592 COLLECT BLOOD FROM PICC: CPT

## 2020-08-23 PROCEDURE — 80048 BASIC METABOLIC PNL TOTAL CA: CPT

## 2020-08-23 RX ADMIN — Medication 10 ML: at 08:28

## 2020-08-23 RX ADMIN — VALACYCLOVIR HYDROCHLORIDE 500 MG: 500 TABLET, FILM COATED ORAL at 20:16

## 2020-08-23 RX ADMIN — APIXABAN 2.5 MG: 5 TABLET, FILM COATED ORAL at 08:29

## 2020-08-23 RX ADMIN — LEVETIRACETAM 500 MG: 500 TABLET, FILM COATED ORAL at 08:30

## 2020-08-23 RX ADMIN — PIPERACILLIN SODIUM AND TAZOBACTAM SODIUM 4.5 G: 4; .5 INJECTION, POWDER, LYOPHILIZED, FOR SOLUTION INTRAVENOUS at 08:31

## 2020-08-23 RX ADMIN — Medication 10 ML: at 20:27

## 2020-08-23 RX ADMIN — FLUCONAZOLE 200 MG: 200 TABLET ORAL at 08:29

## 2020-08-23 RX ADMIN — PIPERACILLIN SODIUM AND TAZOBACTAM SODIUM 4.5 G: 4; .5 INJECTION, POWDER, LYOPHILIZED, FOR SOLUTION INTRAVENOUS at 20:16

## 2020-08-23 RX ADMIN — SODIUM CHLORIDE 500 ML: 9 INJECTION, SOLUTION INTRAVENOUS at 08:31

## 2020-08-23 RX ADMIN — PIPERACILLIN SODIUM AND TAZOBACTAM SODIUM 4.5 G: 4; .5 INJECTION, POWDER, LYOPHILIZED, FOR SOLUTION INTRAVENOUS at 14:55

## 2020-08-23 RX ADMIN — PIPERACILLIN SODIUM AND TAZOBACTAM SODIUM 4.5 G: 4; .5 INJECTION, POWDER, LYOPHILIZED, FOR SOLUTION INTRAVENOUS at 03:37

## 2020-08-23 RX ADMIN — Medication 500 MG: at 08:44

## 2020-08-23 RX ADMIN — INSULIN LISPRO 1 UNITS: 100 INJECTION, SOLUTION INTRAVENOUS; SUBCUTANEOUS at 20:27

## 2020-08-23 RX ADMIN — VALACYCLOVIR HYDROCHLORIDE 500 MG: 500 TABLET, FILM COATED ORAL at 08:28

## 2020-08-23 RX ADMIN — LEVETIRACETAM 500 MG: 500 TABLET, FILM COATED ORAL at 20:16

## 2020-08-23 RX ADMIN — APIXABAN 2.5 MG: 5 TABLET, FILM COATED ORAL at 20:17

## 2020-08-23 RX ADMIN — MULTIPLE VITAMINS W/ MINERALS TAB 1 TABLET: TAB at 08:29

## 2020-08-23 ASSESSMENT — PAIN SCALES - GENERAL
PAINLEVEL_OUTOF10: 0

## 2020-08-23 NOTE — PLAN OF CARE
Problem: Falls - Risk of:  Goal: Will remain free from falls  Description: Will remain free from falls  8/23/2020 1844 by Tracey Cook RN  Outcome: Ongoing  Note: Orthostatic vital signs obtained at start of shift - see flowsheet for details. Pt does not meet criteria for orthostasis. Pt is a Med fall risk. See Karla Harveyinion Fall Score and ABCDS Injury Risk assessments. - Screening for Orthostasis AND not a [place] Risk per VALENCIA/ABCDS: Pt bed is in low position, side rails up, call light and belongings are in reach. Fall risk light is on outside pts room. Pt encouraged to call for assistance as needed. Will continue with hourly rounds for PO intake, pain needs, toileting and repositioning as needed. Problem: Bleeding:  Goal: Will show no signs and symptoms of excessive bleeding  Description: Will show no signs and symptoms of excessive bleeding  8/23/2020 1844 by Tracey Cook RN  Outcome: Ongoing  Note: Patient's hemoglobin this AM:   Recent Labs     08/23/20  0330   HGB 11.8*     Patient's platelet count this AM:   Recent Labs     08/23/20  0330       Thrombocytopenia not present at this time. Patient showing no signs or symptoms of active bleeding. Transfusion not indicated at this time. Patient verbalizes understanding of all instructions. Will continue to assess and implement POC. Call light within reach and hourly rounding in place. Problem: Pain:  Goal: Pain level will decrease  Description: Pt has not complained of pain during this shift. Will continue to monitor. 8/23/2020 1844 by Tracey Cook RN  Outcome: Ongoing  Note: Patient did not state any pain during this shift. Will continue to monitor.      Problem: Venous Thromboembolism:  Goal: Will show no signs or symptoms of venous thromboembolism  Description: Will show no signs or symptoms of venous thromboembolism  8/23/2020 1844 by Tracey Cook RN  Outcome: Ongoing  Note: Adherent with DVT Prevention: Pt is at risk for DVT d/t decreased mobility and cancer treatment. Pt educated on importance of activity. Pt ambulated several times this shift. Pt verbalizes understanding of need for prophylaxis while inpatient. Problem: Infection - Central Venous Catheter-Associated Bloodstream Infection:  Goal: Will show no infection signs and symptoms  Description: Will show no infection signs and symptoms  8/23/2020 1844 by Spenser Gan RN  Note: CVC site remains free of signs/symptoms of infection. No drainage, edema, erythema, pain, or warmth noted at site. Dressing changes continue per protocol and on an as needed basis - see flowsheet. Compliant with [facility] Bath Protocol:  Performed CHG bath today per [facility] protocol utilizing Bed bath with CHG wipes. CVC site cleansed with CHG wipe over dressing, skin surrounding dressing, and first 6\" of IV tubing. Pt tolerated well. Continued to encourage daily CHG bathing per [facility] protocol.        Electronically signed by Spenser Gan RN on 8/23/2020 at 6:47 PM

## 2020-08-23 NOTE — PROGRESS NOTES
[facility]      2020    Luz Elena Garcia    :  1940    MRN:  [id number]    Referring MD: Opal Mooney MD  [address], [address], [address]      Subjective:  No further fevers -      ECOG PS:  (3) Capable of limited self-care, confined to bed or chair > 50% of waking hours    KPS: 60% Requires occasional assistance, but is able to care for most of his personal needs    Isolation:  None     Medications    Scheduled Meds:   fluconazole  200 mg Oral Daily    valACYclovir  500 mg Oral BID    insulin lispro  0-12 Units Subcutaneous TID WC    insulin lispro  0-6 Units Subcutaneous Nightly    apixaban  2.5 mg Oral BID    therapeutic multivitamin-minerals  1 tablet Oral Daily    vitamin C  500 mg Oral Daily    levETIRAcetam  500 mg Oral BID    sodium chloride flush  10 mL Intravenous 2 times per day    Saline Mouthwash  15 mL Swish & Spit 4x Daily AC & HS    piperacillin-tazobactam  4.5 g Intravenous Q6H     Continuous Infusions:   dextrose      sodium chloride 500 mL (20 0831)    potassium chloride       PRN Meds:.glucose, dextrose, glucagon (rDNA), dextrose, oxyCODONE, ondansetron, sodium chloride, sodium chloride flush, potassium chloride, magnesium sulfate, magnesium hydroxide, Saline Mouthwash, alteplase, acetaminophen, traMADol      ROS:  As noted above, otherwise remainder of 10-point ROS negative      Physical Exam:     Vital Signs:  /70   Pulse 65   Temp 97.6 °F (36.4 °C) (Oral)   Resp 18   Ht 5' 11\" (1.803 m)   Wt 248 lb 9.6 oz (112.8 kg)   SpO2 98%   BMI 34.67 kg/m²     Weight:    Wt Readings from Last 3 Encounters:   20 248 lb 9.6 oz (112.8 kg)       General: Awake, alert and oriented.   HEENT: normocephalic, alopecia, PERRL, no scleral erythema or icterus, Oral mucosa moist and intact, throat clear  NECK: supple without palpable adenopathy  BACK: Straight negative CVAT  SKIN: warm dry and intact without lesions rashes or masses  CHEST: CTA bilaterally without use of accessory muscles  CV: Normal S1 S2, RRR, no MRG  ABD: NT ND normoactive BS, no palpable masses or hepatosplenomegaly  EXTREMITIES: without edema, denies calf tenderness  NEURO: CN II - XII grossly intact; strength 4/5 in BLE except 5/5 plantar and dorsiflexioin at the ankles, sensation in the LE intact, finger to dose intact as well as upper body strength. Walks with unsteady wide based gait which is new. CATHETER: Right chest port     Laboratory Data:  CBC:   Recent Labs     08/21/20  0406 08/22/20  0405 08/23/20  0330   WBC 1.5* 1.7* 2.1*   HGB 11.9* 11.7* 11.8*   HCT 34.3* 34.0* 34.7*   MCV 97.9 98.3 98.8    162 187     BMP/Mag:  Recent Labs     08/21/20  0406 08/22/20  0405 08/23/20  0330    137 136   K 4.1 4.0 3.8    102 102   CO2 27 27 27   PHOS 3.5  --   --    BUN 16 15 17   CREATININE 1.1 1.0 1.2   MG 2.00  --   --      LIVP:   Recent Labs     08/21/20  0406 08/22/20  0405 08/23/20  0330   AST 19 17 18   ALT 18 16 15   BILIDIR <0.2 <0.2 <0.2   BILITOT 0.6 0.6 0.6   ALKPHOS 73 70 63     Coags:   Recent Labs     08/21/20  0406   PROTIME 11.7   INR 1.01   APTT 28.8     Uric Acid   Recent Labs     08/21/20  0406   LABURIC 2.5*     Lab Results   Component Value Date    CRP 8.1 (H) 08/23/2020    CRP 16.8 (H) 08/22/2020    CRP 28.2 (H) 08/21/2020     Lab Results   Component Value Date    FERRITIN 241.4 08/23/2020    FERRITIN 241.7 08/22/2020    FERRITIN 284.1 08/21/2020       PROBLEM LIST:             1. Diffuse Large B-Cell Lymphoma  2. H/o PE  3. DVT      TREATMENT:             1. R-CHOP x 6 cycles (1/10/2019-4/24/2019)  2. USOR 69167 - lymphodepleting chemotherapy:  Fludarabine and cyclophosphamide (8/10/2020-8/12/2020) f/b CAR-T 8/14/2020     ASSESSMENT AND PLAN:           1.  Recurrent NHL  - PET/CT (6/30/20): right paraortic mass, SUV 5.2.   - Plan: lympho-depleting chemo (Fludarabine/Cyclophosphamide, 8/10-8/12/20) followed by CAR-T infusion (8/14/20) per clinical trial.      Day +9  Lymphodepleting Chemotherapy: Fludarabine and cyclophosphamide  Disease Status at time of Infusion: Relapsed  CAR-T Infusion Date: 8/14/20  CAR-T Product:  Brendan Naik ([id number])    2. CRS / Neuro:  No evidence  - Neuro checks w/ CARTOX 10-point assessment Q4hrs  - Cont Keppra 500mg BID (8/10/20)   - Consult neurology on admission  - If above workup normal would advise EMG of both legs when able  - The patient should follow up with Neurology as an outpatient after discharge, would advise with Dr. Rita Coronel or Dr. Mu Dos Santos if an option  - MRI T/L/S spine (8/19/20): Focal T2 hyperintense intramedullary signal at T7 suggestive of transverse myelitis. Multilevel degenerative disc disease.  - LP (8/20/20): CSF is bloody w/slightly elevated protein & WBCs. Otherwise cytology/flow as well as viral, fungal, & bacterial cultures all pending  - Monitor CRP and Ferrtin closely - continuing to rise  Lab Results   Component Value Date    CRP 8.1 (H) 08/23/2020    CRP 16.8 (H) 08/22/2020    CRP 28.2 (H) 08/21/2020     Lab Results   Component Value Date    FERRITIN 241.4 08/23/2020    FERRITIN 241.7 08/22/2020    FERRITIN 284.1 08/21/2020     Toxicity Grading  CRS Grade: Grade 0  ICANS Grade:  Grade 0  ICE Score:  CAR-T Score: 10    3. ID: febrile following admission - suspect possible CRS although cannot r/o systemic infection. No localizing symptoms. Now afebrile  - Cont Diflucan & Valtrex ppx while neutropenic  - Cont Zosyn Day +5 (8/19/20)  - Blood Cxs 8/19 - NGTD    4. Heme: Neutropenia & Anemia from chemotherapy   - Transfuse for Hgb < 7 and Platelets < 44O  - No transfusion today    5.   Metabolic: +Hyperglycemia  TLS:  No evidence, cont allopurinol and daily TLS labs   - D/c IVFs  - Replace potassium and magnesium per PRN orders  - Cont humalog Med SSI & accuchecks AC/HS  - Check HgbA1c 8/19 - 8.6 c/w T2DM  - Consult dietitian for DM diet education  - Will need f/u with endocrinology once outpt    6. Nutrition: Appetite and oral intake is good. - Cont low microbial diet   - Follow closely with dietary    7. Coagulation: H/o PE and DVT on chronic anticoagulation  - Resume eliquis  - No evidence of acute thrombosis at this time      - DVT Prophylaxis: Cont tx dose eliquis   Contraindications to pharmacologic prophylaxis: None  Contraindications to mechanical prophylaxis: None    - Disposition:  Uncertain at this time      CHIKIS Spicer - URSULA Reyez. Radha Pastor DO, MS  [facility]    Please contact via:  1. [employer]  2.   Cell Phone:  [phone]    8/23/2020   1:22 PM

## 2020-08-24 ENCOUNTER — APPOINTMENT (OUTPATIENT)
Dept: GENERAL RADIOLOGY | Age: 80
DRG: 552 | End: 2020-08-24
Attending: INTERNAL MEDICINE
Payer: MEDICARE

## 2020-08-24 LAB
ALBUMIN SERPL-MCNC: 4.1 G/DL (ref 3.4–5)
ALP BLD-CCNC: 68 U/L (ref 40–129)
ALT SERPL-CCNC: 16 U/L (ref 10–40)
ANION GAP SERPL CALCULATED.3IONS-SCNC: 10 MMOL/L (ref 3–16)
APTT: 32.1 SEC (ref 24.2–36.2)
AST SERPL-CCNC: 19 U/L (ref 15–37)
BASOPHILS ABSOLUTE: 0 K/UL (ref 0–0.2)
BASOPHILS RELATIVE PERCENT: 0.8 %
BILIRUB SERPL-MCNC: 0.5 MG/DL (ref 0–1)
BILIRUBIN DIRECT: <0.2 MG/DL (ref 0–0.3)
BILIRUBIN URINE: NEGATIVE
BILIRUBIN, INDIRECT: NORMAL MG/DL (ref 0–1)
BLOOD, URINE: NEGATIVE
BUN BLDV-MCNC: 13 MG/DL (ref 7–20)
C-REACTIVE PROTEIN: 4.8 MG/L (ref 0–5.1)
CALCIUM SERPL-MCNC: 9.1 MG/DL (ref 8.3–10.6)
CHLORIDE BLD-SCNC: 103 MMOL/L (ref 99–110)
CLARITY: CLEAR
CO2: 26 MMOL/L (ref 21–32)
COLOR: YELLOW
CREAT SERPL-MCNC: 1.1 MG/DL (ref 0.8–1.3)
D DIMER: 212 NG/ML DDU (ref 0–229)
EOSINOPHILS ABSOLUTE: 0.1 K/UL (ref 0–0.6)
EOSINOPHILS RELATIVE PERCENT: 2.5 %
FERRITIN: 241.7 NG/ML (ref 30–400)
FIBRINOGEN: 445 MG/DL (ref 200–397)
GFR AFRICAN AMERICAN: >60
GFR NON-AFRICAN AMERICAN: >60
GLUCOSE BLD-MCNC: 141 MG/DL (ref 70–99)
GLUCOSE BLD-MCNC: 155 MG/DL (ref 70–99)
GLUCOSE URINE: NEGATIVE MG/DL
HCT VFR BLD CALC: 36.8 % (ref 40.5–52.5)
HEMOGLOBIN: 12.5 G/DL (ref 13.5–17.5)
INR BLD: 1.15 (ref 0.86–1.14)
KETONES, URINE: ABNORMAL MG/DL
LACTATE DEHYDROGENASE: 173 U/L (ref 100–190)
LEUKOCYTE ESTERASE, URINE: NEGATIVE
LYMPHOCYTES ABSOLUTE: 0.8 K/UL (ref 1–5.1)
LYMPHOCYTES RELATIVE PERCENT: 33.7 %
MAGNESIUM: 2.1 MG/DL (ref 1.8–2.4)
MCH RBC QN AUTO: 33.8 PG (ref 26–34)
MCHC RBC AUTO-ENTMCNC: 34 G/DL (ref 31–36)
MCV RBC AUTO: 99.2 FL (ref 80–100)
MICROSCOPIC EXAMINATION: ABNORMAL
MONOCYTES ABSOLUTE: 0.5 K/UL (ref 0–1.3)
MONOCYTES RELATIVE PERCENT: 19.6 %
NEUTROPHILS ABSOLUTE: 1 K/UL (ref 1.7–7.7)
NEUTROPHILS RELATIVE PERCENT: 43.4 %
NITRITE, URINE: NEGATIVE
PDW BLD-RTO: 13.4 % (ref 12.4–15.4)
PERFORMED ON: ABNORMAL
PH UA: 6.5 (ref 5–8)
PHOSPHORUS: 2.9 MG/DL (ref 2.5–4.9)
PLATELET # BLD: 212 K/UL (ref 135–450)
PMV BLD AUTO: 8 FL (ref 5–10.5)
POTASSIUM SERPL-SCNC: 4 MMOL/L (ref 3.5–5.1)
PROTEIN UA: NEGATIVE MG/DL
PROTHROMBIN TIME: 13.4 SEC (ref 10–13.2)
RBC # BLD: 3.71 M/UL (ref 4.2–5.9)
SODIUM BLD-SCNC: 139 MMOL/L (ref 136–145)
SPECIFIC GRAVITY UA: 1.01 (ref 1–1.03)
TOTAL PROTEIN: 6.7 G/DL (ref 6.4–8.2)
URIC ACID, SERUM: 2.6 MG/DL (ref 3.5–7.2)
URINE TYPE: ABNORMAL
UROBILINOGEN, URINE: 0.2 E.U./DL
WBC # BLD: 2.4 K/UL (ref 4–11)

## 2020-08-24 PROCEDURE — 80076 HEPATIC FUNCTION PANEL: CPT

## 2020-08-24 PROCEDURE — 85610 PROTHROMBIN TIME: CPT

## 2020-08-24 PROCEDURE — 6370000000 HC RX 637 (ALT 250 FOR IP): Performed by: PHYSICIAN ASSISTANT

## 2020-08-24 PROCEDURE — 6360000002 HC RX W HCPCS: Performed by: INTERNAL MEDICINE

## 2020-08-24 PROCEDURE — 71045 X-RAY EXAM CHEST 1 VIEW: CPT

## 2020-08-24 PROCEDURE — 84100 ASSAY OF PHOSPHORUS: CPT

## 2020-08-24 PROCEDURE — 83615 LACTATE (LD) (LDH) ENZYME: CPT

## 2020-08-24 PROCEDURE — 2580000003 HC RX 258: Performed by: PHYSICIAN ASSISTANT

## 2020-08-24 PROCEDURE — 85730 THROMBOPLASTIN TIME PARTIAL: CPT

## 2020-08-24 PROCEDURE — 83735 ASSAY OF MAGNESIUM: CPT

## 2020-08-24 PROCEDURE — 85025 COMPLETE CBC W/AUTO DIFF WBC: CPT

## 2020-08-24 PROCEDURE — 86140 C-REACTIVE PROTEIN: CPT

## 2020-08-24 PROCEDURE — 82232 ASSAY OF BETA-2 PROTEIN: CPT

## 2020-08-24 PROCEDURE — 2060000000 HC ICU INTERMEDIATE R&B

## 2020-08-24 PROCEDURE — 82728 ASSAY OF FERRITIN: CPT

## 2020-08-24 PROCEDURE — 2580000003 HC RX 258: Performed by: INTERNAL MEDICINE

## 2020-08-24 PROCEDURE — 85379 FIBRIN DEGRADATION QUANT: CPT

## 2020-08-24 PROCEDURE — 85384 FIBRINOGEN ACTIVITY: CPT

## 2020-08-24 PROCEDURE — 6370000000 HC RX 637 (ALT 250 FOR IP): Performed by: NURSE PRACTITIONER

## 2020-08-24 PROCEDURE — 84550 ASSAY OF BLOOD/URIC ACID: CPT

## 2020-08-24 PROCEDURE — 80048 BASIC METABOLIC PNL TOTAL CA: CPT

## 2020-08-24 PROCEDURE — 81003 URINALYSIS AUTO W/O SCOPE: CPT

## 2020-08-24 PROCEDURE — 84478 ASSAY OF TRIGLYCERIDES: CPT

## 2020-08-24 RX ORDER — CEFDINIR 300 MG/1
300 CAPSULE ORAL EVERY 12 HOURS SCHEDULED
Status: DISCONTINUED | OUTPATIENT
Start: 2020-08-24 | End: 2020-08-25 | Stop reason: HOSPADM

## 2020-08-24 RX ADMIN — PIPERACILLIN SODIUM AND TAZOBACTAM SODIUM 4.5 G: 4; .5 INJECTION, POWDER, LYOPHILIZED, FOR SOLUTION INTRAVENOUS at 03:24

## 2020-08-24 RX ADMIN — PIPERACILLIN SODIUM AND TAZOBACTAM SODIUM 4.5 G: 4; .5 INJECTION, POWDER, LYOPHILIZED, FOR SOLUTION INTRAVENOUS at 08:24

## 2020-08-24 RX ADMIN — APIXABAN 2.5 MG: 5 TABLET, FILM COATED ORAL at 08:24

## 2020-08-24 RX ADMIN — CEFDINIR 300 MG: 300 CAPSULE ORAL at 21:41

## 2020-08-24 RX ADMIN — INSULIN LISPRO 2 UNITS: 100 INJECTION, SOLUTION INTRAVENOUS; SUBCUTANEOUS at 08:25

## 2020-08-24 RX ADMIN — LEVETIRACETAM 500 MG: 500 TABLET, FILM COATED ORAL at 21:41

## 2020-08-24 RX ADMIN — VALACYCLOVIR HYDROCHLORIDE 500 MG: 500 TABLET, FILM COATED ORAL at 21:41

## 2020-08-24 RX ADMIN — CEFDINIR 300 MG: 300 CAPSULE ORAL at 10:31

## 2020-08-24 RX ADMIN — VALACYCLOVIR HYDROCHLORIDE 500 MG: 500 TABLET, FILM COATED ORAL at 08:24

## 2020-08-24 RX ADMIN — Medication 500 MG: at 08:33

## 2020-08-24 RX ADMIN — Medication 10 ML: at 08:24

## 2020-08-24 RX ADMIN — METFORMIN HYDROCHLORIDE 500 MG: 500 TABLET ORAL at 17:01

## 2020-08-24 RX ADMIN — MULTIPLE VITAMINS W/ MINERALS TAB 1 TABLET: TAB at 08:24

## 2020-08-24 RX ADMIN — METFORMIN HYDROCHLORIDE 500 MG: 500 TABLET ORAL at 10:31

## 2020-08-24 RX ADMIN — FLUCONAZOLE 200 MG: 200 TABLET ORAL at 08:24

## 2020-08-24 RX ADMIN — LEVETIRACETAM 500 MG: 500 TABLET, FILM COATED ORAL at 08:24

## 2020-08-24 RX ADMIN — APIXABAN 2.5 MG: 5 TABLET, FILM COATED ORAL at 21:40

## 2020-08-24 ASSESSMENT — PAIN SCALES - GENERAL
PAINLEVEL_OUTOF10: 0

## 2020-08-24 NOTE — PROGRESS NOTES
VSS. Ortho negative. Assessment completed. Scheduled meds given whole with water. Pt resting comfortably in chair. Call light within reach. Denies further needs at this time. Will continue to monitor.  Electronically signed by Jacoby Ortega RN on 8/24/2020 at 9:06 AM

## 2020-08-24 NOTE — PLAN OF CARE
Problem: Falls - Risk of:  Goal: Will remain free from falls  Description: Will remain free from falls  8/24/2020 1811 by Jacoby Ortega RN  Outcome: Ongoing  Note: Orthostatic vital signs obtained at start of shift - see flowsheet for details. Pt does not meet criteria for orthostasis. Pt is a Med fall risk. See Estel Clement Fall Score and ABCDS Injury Risk assessments. Pt bed is in low position, side rails up, call light and belongings are in reach. Fall risk light is on outside pts room. Pt encouraged to call for assistance as needed. Will continue with hourly rounds for PO intake, pain needs, toileting and repositioning as needed. Problem: PROTECTIVE PRECAUTIONS  Goal: Patient will remain free of nosocomial Infections  Description: Pt currently in a private, positive pressure room. Educated pt on wearing a mask when neutropenic and/or leaving the floor. No living plants or flowers allowed. Also reinforced importance of hand hygiene. Pt following a low microbial diet. Surfaces throughout room cleaned with bleach wipes per unit policy. Outcome: Ongoing  Note: Pt remains in protective precautions. No living plants or fresh flowers in his/her room. Patient educated on wearing mask when in hallways. Patient, staff, and visitors adhering to handwashing guidelines. Patient cleansed with chlorhexidine wipes and linens changed daily per protocol. Pt verbalizes understanding of low microbial diet. Patient remains free of nosocomial infections. Problem: Bleeding:  Goal: Will show no signs and symptoms of excessive bleeding  Description: Will show no signs and symptoms of excessive bleeding  8/24/2020 1811 by Jacoby Ortega RN  Outcome: Ongoing  Note: Patient's hemoglobin this AM:   Recent Labs     08/24/20  0322   HGB 12.5*     Patient's platelet count this AM:   Recent Labs     08/24/20  0322       Thrombocytopenia not present at this time.   Patient showing no signs or symptoms of active bleeding. Transfusion not indicated at this time. Patient verbalizes understanding of all instructions. Will continue to assess and implement POC. Call light within reach and hourly rounding in place. Problem: Pain:  Goal: Pain level will decrease  Description: Pt has not complained of pain during this shift. Will continue to monitor. 8/24/2020 1811 by Jaiden Bernal RN  Outcome: Ongoing  Note: Pt educated on importance of calling for pain meds when in pain. Pt verbalized understanding. Pain assessment completed at least once per shift. Will continue to monitor. Problem: Infection - Central Venous Catheter-Associated Bloodstream Infection:  Goal: Will show no infection signs and symptoms  Description: Will show no infection signs and symptoms  8/24/2020 1811 by Jaiden Bernal RN  Outcome: Ongoing  Note: CVC site remains free of signs/symptoms of infection. No drainage, edema, erythema, pain, or warmth noted at site. Dressing changes continue per protocol and on an as needed basis - see flowsheet. Performed CHG bath today per [facility] protocol utilizing Bed bath with CHG wipes. CVC site cleansed with CHG wipe over dressing, skin surrounding dressing, and first 6\" of IV tubing. Pt tolerated well. Continued to encourage daily CHG bathing per [facility] protocol.

## 2020-08-24 NOTE — BH NOTE
Psychology    Psychologist attended clinical rounds with team and remained after to speak with pt. He's in good spirits, making jokes. Provided emotional support and encouragement and will continue to see pt for routine visits through hospital stay.     Lashanda Garcias Psy.D., ABPP

## 2020-08-24 NOTE — PROGRESS NOTES
lesions rashes or masses  CHEST: CTA bilaterally without use of accessory muscles  CV: Normal S1 S2, RRR, no MRG  ABD: NT ND normoactive BS, no palpable masses or hepatosplenomegaly  EXTREMITIES: without edema, denies calf tenderness  NEURO: CN II - XII grossly intact; strength 4/5 in BLE except 5/5 plantar and dorsiflexioin at the ankles, sensation in the LE intact, finger to dose intact as well as upper body strength. Walks with unsteady wide based gait which is new. CATHETER: Right chest port     Laboratory Data:  CBC:   Recent Labs     08/22/20  0405 08/23/20  0330 08/24/20  0322   WBC 1.7* 2.1* 2.4*   HGB 11.7* 11.8* 12.5*   HCT 34.0* 34.7* 36.8*   MCV 98.3 98.8 99.2    187 212     BMP/Mag:  Recent Labs     08/22/20  0405 08/23/20  0330 08/24/20  0322    136 139   K 4.0 3.8 4.0    102 103   CO2 27 27 26   PHOS  --   --  2.9   BUN 15 17 13   CREATININE 1.0 1.2 1.1   MG  --   --  2.10     LIVP:   Recent Labs     08/22/20  0405 08/23/20  0330 08/24/20  0322   AST 17 18 19   ALT 16 15 16   BILIDIR <0.2 <0.2 <0.2   BILITOT 0.6 0.6 0.5   ALKPHOS 70 63 68     Coags:   Recent Labs     08/24/20 0322   PROTIME 13.4*   INR 1.15*   APTT 32.1     Uric Acid   Recent Labs     08/24/20  0322   LABURIC 2.6*     Lab Results   Component Value Date    CRP 4.8 08/24/2020    CRP 8.1 (H) 08/23/2020    CRP 16.8 (H) 08/22/2020     Lab Results   Component Value Date    FERRITIN 241.7 08/24/2020    FERRITIN 241.4 08/23/2020    FERRITIN 241.7 08/22/2020       PROBLEM LIST:             1. Diffuse Large B-Cell Lymphoma  2. H/o PE  3. DVT      TREATMENT:             1. R-CHOP x 6 cycles (1/10/2019-4/24/2019)  2. USOR 04826 - lymphodepleting chemotherapy:  Fludarabine and cyclophosphamide (8/10/2020-8/12/2020) f/b CAR-T 8/14/2020     ASSESSMENT AND PLAN:           1.  Recurrent NHL  - PET/CT (6/30/20): right paraortic mass, SUV 5.2.   - Plan: lympho-depleting chemo (Fludarabine/Cyclophosphamide, 8/10-8/12/20) followed by CAR-T infusion (8/14/20) per clinical trial.      Day +10  Lymphodepleting Chemotherapy: Fludarabine and cyclophosphamide  Disease Status at time of Infusion: Relapsed  CAR-T Infusion Date: 8/14/20  CAR-T Product:  Lisocabtagene Maraleucel ([id number])    2. CRS / Neuro:  No evidence  - Neuro checks w/ CARTOX 10-point assessment Q4hrs  - Cont Keppra 500mg BID (8/10/20)   - Consult neurology on admission  - If above workup normal would advise EMG of both legs when able  - The patient should follow up with Neurology as an outpatient after discharge, would advise with Dr. Ely Hummel or Dr. María Vasquez if an option  - MRI T/L/S spine (8/19/20): Focal T2 hyperintense intramedullary signal at T7 suggestive of transverse myelitis. Multilevel degenerative disc disease.  - LP (8/20/20): CSF is bloody w/slightly elevated protein & WBCs. Otherwise cytology/flow as well as viral, fungal, & bacterial cultures all pending  - Monitor CRP and Ferrtin closely - trending down  Lab Results   Component Value Date    CRP 4.8 08/24/2020    CRP 8.1 (H) 08/23/2020    CRP 16.8 (H) 08/22/2020     Lab Results   Component Value Date    FERRITIN 241.7 08/24/2020    FERRITIN 241.4 08/23/2020    FERRITIN 241.7 08/22/2020     Toxicity Grading  CRS Grade: Grade 0  ICANS Grade:  Grade 0  ICE Score:  CAR-T Score: 10    3. ID: febrile following admission - suspect possible CRS although cannot r/o systemic infection. No localizing symptoms. Now afebrile  - Cont Diflucan & Valtrex ppx while neutropenic  - Cont Zosyn Day +6 (8/19/20) - stop 8/24  - Blood Cxs 8/19 - NGTD  -Throat culture 8/19/20- haemophilus parainfluenzae  - Cefdinir Day 1    4. Heme: Neutropenia & Anemia from chemotherapy   - Transfuse for Hgb < 7 and Platelets < 57X  - No transfusion today    5.   Metabolic: +Mild Hyperglycemia  TLS:  No evidence, cont allopurinol and daily TLS labs   - D/c IVFs  - Replace potassium and magnesium per PRN orders  - Cont humalog Med SSI &

## 2020-08-24 NOTE — DISCHARGE SUMMARY
[facility] Discharge Summary             Attending Physician: Kathleen Toussaint MD    Referring MD: Kathleen Toussaint MD  [address] [facility], [facility], [address]    Name: Loki Linder :  1940  MRN:  [id number]    Admission: 2020   Discharge:   20     Date: 2020    Diagnosis on admit: Neurotoxicity     Consultations:   Neurology:  Dr. Debbie Gordon    Medications:    Tiara Sensor   Home Medication Instructions YY    Printed on:20 7913   Medication Information                      apixaban (ELIQUIS) 2.5 MG TABS tablet  Take 2.5 mg by mouth 2 times daily             levETIRAcetam (KEPPRA) 500 MG tablet  Take 500 mg by mouth 2 times daily             Multiple Vitamins-Minerals (THERAPEUTIC MULTIVITAMIN-MINERALS) tablet  Take 1 tablet by mouth daily             ondansetron (ZOFRAN) 8 MG tablet  Take 8 mg by mouth every 8 hours as needed for Nausea or Vomiting             vitamin C (ASCORBIC ACID) 500 MG tablet  Take 500 mg by mouth daily                 Reason for Admission: Neurotoxicity    Hospital Course:   Mr. Caitlyn Hancock is an 60-year-old gentleman with a history of diffuse large B-cell lymphoma who is status post [facility] clinical trial [address]45. Patient received his CAR-T infusion on 2020. Patient presented on 2020 in his usual state of health but went home and then developed sudden onset of numbness in bilateral lower extremities. Patient was then admitted for further evaluation and treatment. Neurology was consulted for evaluation of numbness. Patient had an MRI of the spine which showed a focal T2 hyperintense intramedullary signal at T7 suggestive of transverse myelitis. Patient also multilevel degenerative disc disease. Patient had symptoms of numbness for approximately 4 hours and then they dissipated without intervention. An LP was performed on 2020 which showed a bloody CSF with slightly elevated protein and white blood cells.   Otherwise cytology & flow were negative for atypical cells. Cultures are pending, however have no growth at this time. Neurologic symptoms are now completely resolved. Patient will have an outpatient EMG performed as well as follow-up with neurology with either Dr. Barbara Varner or Dr. Js Chau if possible. Patient did have a neutropenic fever shortly following admission, which might have been related to CRS. Patient had blood cultures performed which were negative to date. Patient had a throat culture that was positive for Haemophilus parainfluenza. Patient completed 6 days of Zosyn and will continue cefdinir as an outpatient to complete 14 days of tx. Also incidentally noted during admission, patient had hyperglycemia and a hemoglobin A1c was performed which was elevated 8.6. This would be a newly diagnosed type 2 diabetes. Patient was initially started on insulin sliding scale but will be discharged with metformin and will follow up with primary care physician as an outpatient. Patient will be discharged today with lab and follow-up with provider on Wednesday, 8/26/2020. Physical Exam:     Vital Signs:  /78   Pulse 67   Temp 97.7 °F (36.5 °C) (Oral)   Resp 18   Ht 5' 11\" (1.803 m)   Wt 244 lb 6.4 oz (110.9 kg)   SpO2 100%   BMI 34.09 kg/m²     Weight:    Wt Readings from Last 3 Encounters:   08/24/20 244 lb 6.4 oz (110.9 kg)       KPS: 80% Normal activity with effort; some signs or symptoms of disease       General: Awake, alert and oriented.   HEENT: normocephalic, alopecia, PERRL, no scleral erythema or icterus, Oral mucosa moist and intact, throat clear  NECK: supple without palpable adenopathy  BACK: Straight negative CVAT  SKIN: warm dry and intact without lesions rashes or masses  CHEST: CTA bilaterally without use of accessory muscles  CV: Normal S1 S2, RRR, no MRG  ABD: NT ND normoactive BS, no palpable masses or hepatosplenomegaly  EXTREMITIES: without edema, denies calf tenderness  NEURO: CN II - XII grossly intact; strength 4/5 in BLE except 5/5 plantar and dorsiflexioin at the ankles, sensation in the LE intact, finger to dose intact as well as upper body strength.  Walks with unsteady wide based gait which is new. CATHETER: Right chest port     Discharge Laboratory Data:  CBC:   Recent Labs     08/22/20  0405 08/23/20  0330 08/24/20  0322   WBC 1.7* 2.1* 2.4*   HGB 11.7* 11.8* 12.5*   HCT 34.0* 34.7* 36.8*   MCV 98.3 98.8 99.2    187 212     BMP/Mag:  Recent Labs     08/22/20 0405 08/23/20  0330 08/24/20  0322    136 139   K 4.0 3.8 4.0    102 103   CO2 27 27 26   PHOS  --   --  2.9   BUN 15 17 13   CREATININE 1.0 1.2 1.1   MG  --   --  2.10     LIVP:   Recent Labs     08/22/20  0405 08/23/20  0330 08/24/20  0322   AST 17 18 19   ALT 16 15 16   BILIDIR <0.2 <0.2 <0.2   BILITOT 0.6 0.6 0.5   ALKPHOS 70 63 68     Coags:   Recent Labs     08/24/20 0322   PROTIME 13.4*   INR 1.15*   APTT 32.1     Uric Acid   Recent Labs     08/24/20 0322   LABURIC 2.6*     Tacro:  No results for input(s): TACROLEV in the last 72 hours. CMV Quant DNA by PCR: No results found for: CMVDNAQNT  IgG: No results for input(s): IGG in the last 72 hours. PROBLEM LIST:          1. Diffuse Large B-Cell Lymphoma  2. H/o PE  3. DVT  4. T2DM      TREATMENT:             1. R-CHOP x 6 cycles (1/10/2019-4/24/2019)  2. USOR 62101 - lymphodepleting chemotherapy:  Fludarabine and cyclophosphamide (8/10/2020-8/12/2020) f/b CAR-T 8/14/2020     ASSESSMENT AND PLAN:           1. Recurrent NHL  - PET/CT (6/30/20): right paraortic mass, SUV 5.2.   - Plan: lympho-depleting chemo (Fludarabine/Cyclophosphamide, 8/10-8/12/20) followed by CAR-T infusion (8/14/20) per clinical trial.      Day +11  Lymphodepleting Chemotherapy: Fludarabine and cyclophosphamide  Disease Status at time of Infusion: Relapsed  CAR-T Infusion Date: 8/14/20  CAR-T Product:  Lisocabtagene Maraleucel ([id number])     2.  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 The patient was advised on activity and dietary restrictions. The patient was advised to follow up in the emergency department or contact the physician with any unresolved nausea/vomiting/diarrhea/pain or temperature greater than 100.5 F or any other unusual symptoms. This discharge summary and plan was discussed and agreed upon with Dr. Angeline Aldana.

## 2020-08-25 VITALS
DIASTOLIC BLOOD PRESSURE: 76 MMHG | OXYGEN SATURATION: 99 % | TEMPERATURE: 97.2 F | SYSTOLIC BLOOD PRESSURE: 122 MMHG | WEIGHT: 243.6 LBS | HEIGHT: 71 IN | RESPIRATION RATE: 16 BRPM | HEART RATE: 80 BPM | BODY MASS INDEX: 34.1 KG/M2

## 2020-08-25 PROBLEM — R29.90 NEUROTOXICITY: Status: RESOLVED | Noted: 2020-08-18 | Resolved: 2020-08-25

## 2020-08-25 LAB
ALBUMIN SERPL-MCNC: 3.8 G/DL (ref 3.4–5)
ALP BLD-CCNC: 60 U/L (ref 40–129)
ALT SERPL-CCNC: 14 U/L (ref 10–40)
ANION GAP SERPL CALCULATED.3IONS-SCNC: 10 MMOL/L (ref 3–16)
AST SERPL-CCNC: 18 U/L (ref 15–37)
BASOPHILS ABSOLUTE: 0 K/UL (ref 0–0.2)
BASOPHILS RELATIVE PERCENT: 1 %
BILIRUB SERPL-MCNC: <0.2 MG/DL (ref 0–1)
BILIRUBIN DIRECT: <0.2 MG/DL (ref 0–0.3)
BILIRUBIN, INDIRECT: ABNORMAL MG/DL (ref 0–1)
BUN BLDV-MCNC: 14 MG/DL (ref 7–20)
C-REACTIVE PROTEIN: 3.6 MG/L (ref 0–5.1)
CALCIUM SERPL-MCNC: 8.6 MG/DL (ref 8.3–10.6)
CHLORIDE BLD-SCNC: 104 MMOL/L (ref 99–110)
CO2: 25 MMOL/L (ref 21–32)
CREAT SERPL-MCNC: 1.1 MG/DL (ref 0.8–1.3)
EOSINOPHILS ABSOLUTE: 0.1 K/UL (ref 0–0.6)
EOSINOPHILS RELATIVE PERCENT: 1.9 %
FERRITIN: 206.8 NG/ML (ref 30–400)
GFR AFRICAN AMERICAN: >60
GFR NON-AFRICAN AMERICAN: >60
GLUCOSE BLD-MCNC: 125 MG/DL (ref 70–99)
HCT VFR BLD CALC: 33.7 % (ref 40.5–52.5)
HEMOGLOBIN: 11.8 G/DL (ref 13.5–17.5)
LYMPHOCYTES ABSOLUTE: 0.8 K/UL (ref 1–5.1)
LYMPHOCYTES RELATIVE PERCENT: 28.4 %
MCH RBC QN AUTO: 34.1 PG (ref 26–34)
MCHC RBC AUTO-ENTMCNC: 34.9 G/DL (ref 31–36)
MCV RBC AUTO: 97.6 FL (ref 80–100)
MONOCYTES ABSOLUTE: 0.6 K/UL (ref 0–1.3)
MONOCYTES RELATIVE PERCENT: 22 %
NEUTROPHILS ABSOLUTE: 1.4 K/UL (ref 1.7–7.7)
NEUTROPHILS RELATIVE PERCENT: 46.7 %
PDW BLD-RTO: 13.3 % (ref 12.4–15.4)
PLATELET # BLD: 219 K/UL (ref 135–450)
PMV BLD AUTO: 7.8 FL (ref 5–10.5)
POTASSIUM SERPL-SCNC: 3.8 MMOL/L (ref 3.5–5.1)
RBC # BLD: 3.45 M/UL (ref 4.2–5.9)
SODIUM BLD-SCNC: 139 MMOL/L (ref 136–145)
TOTAL PROTEIN: 6.2 G/DL (ref 6.4–8.2)
TRIGL SERPL-MCNC: 145 MG/DL (ref 0–150)
WBC # BLD: 2.9 K/UL (ref 4–11)

## 2020-08-25 PROCEDURE — 80076 HEPATIC FUNCTION PANEL: CPT

## 2020-08-25 PROCEDURE — 80048 BASIC METABOLIC PNL TOTAL CA: CPT

## 2020-08-25 PROCEDURE — 6360000002 HC RX W HCPCS: Performed by: NURSE PRACTITIONER

## 2020-08-25 PROCEDURE — 36592 COLLECT BLOOD FROM PICC: CPT

## 2020-08-25 PROCEDURE — 2580000003 HC RX 258: Performed by: PHYSICIAN ASSISTANT

## 2020-08-25 PROCEDURE — 85025 COMPLETE CBC W/AUTO DIFF WBC: CPT

## 2020-08-25 PROCEDURE — 6370000000 HC RX 637 (ALT 250 FOR IP): Performed by: NURSE PRACTITIONER

## 2020-08-25 PROCEDURE — 86140 C-REACTIVE PROTEIN: CPT

## 2020-08-25 PROCEDURE — 6370000000 HC RX 637 (ALT 250 FOR IP): Performed by: PHYSICIAN ASSISTANT

## 2020-08-25 PROCEDURE — 82728 ASSAY OF FERRITIN: CPT

## 2020-08-25 RX ORDER — HEPARIN SODIUM (PORCINE) LOCK FLUSH IV SOLN 100 UNIT/ML 100 UNIT/ML
500 SOLUTION INTRAVENOUS ONCE
Status: COMPLETED | OUTPATIENT
Start: 2020-08-25 | End: 2020-08-25

## 2020-08-25 RX ORDER — VALACYCLOVIR HYDROCHLORIDE 500 MG/1
500 TABLET, FILM COATED ORAL 2 TIMES DAILY
Qty: 60 TABLET | Refills: 1
Start: 2020-08-25 | End: 2020-08-25

## 2020-08-25 RX ORDER — VALACYCLOVIR HYDROCHLORIDE 500 MG/1
500 TABLET, FILM COATED ORAL 2 TIMES DAILY
Qty: 60 TABLET | Refills: 1 | Status: SHIPPED | OUTPATIENT
Start: 2020-08-25 | End: 2021-05-06

## 2020-08-25 RX ORDER — FLUCONAZOLE 200 MG/1
200 TABLET ORAL DAILY
Qty: 30 TABLET | Refills: 0
Start: 2020-08-26 | End: 2020-08-25

## 2020-08-25 RX ORDER — FLUCONAZOLE 200 MG/1
200 TABLET ORAL DAILY
Qty: 30 TABLET | Refills: 1 | Status: SHIPPED | OUTPATIENT
Start: 2020-08-26 | End: 2021-05-06

## 2020-08-25 RX ORDER — CEFDINIR 300 MG/1
300 CAPSULE ORAL EVERY 12 HOURS SCHEDULED
Qty: 14 CAPSULE | Refills: 0 | Status: SHIPPED | OUTPATIENT
Start: 2020-08-25 | End: 2020-09-01

## 2020-08-25 RX ADMIN — APIXABAN 2.5 MG: 5 TABLET, FILM COATED ORAL at 08:02

## 2020-08-25 RX ADMIN — LEVETIRACETAM 500 MG: 500 TABLET, FILM COATED ORAL at 08:02

## 2020-08-25 RX ADMIN — VALACYCLOVIR HYDROCHLORIDE 500 MG: 500 TABLET, FILM COATED ORAL at 08:02

## 2020-08-25 RX ADMIN — METFORMIN HYDROCHLORIDE 500 MG: 500 TABLET ORAL at 08:02

## 2020-08-25 RX ADMIN — MULTIPLE VITAMINS W/ MINERALS TAB 1 TABLET: TAB at 08:02

## 2020-08-25 RX ADMIN — Medication 500 MG: at 08:03

## 2020-08-25 RX ADMIN — CEFDINIR 300 MG: 300 CAPSULE ORAL at 08:03

## 2020-08-25 RX ADMIN — Medication 500 UNITS: at 11:33

## 2020-08-25 RX ADMIN — FLUCONAZOLE 200 MG: 200 TABLET ORAL at 08:02

## 2020-08-25 RX ADMIN — Medication 10 ML: at 08:03

## 2020-08-25 ASSESSMENT — PAIN SCALES - GENERAL
PAINLEVEL_OUTOF10: 0

## 2020-08-25 NOTE — DISCHARGE INSTR - COC
Continuity of Care Form    Patient Name: Kerri Camejo   :  1940  MRN:  [id number]    Admit date:  2020  Discharge date:  ***    Code Status Order: Full Code   Advance Directives:   Advance Care Flowsheet Documentation     Date/Time Healthcare Directive Type of Healthcare Directive Copy in [address] Agent's Name Healthcare Agent's Phone Number    20 1537  Yes, patient has an advance directive for healthcare treatment  Durable power of  for health care;Living will  No, copy requested from family  Spouse  --  --          Admitting Physician:  Leslie Simms MD  PCP: Alida Berkowitz MD    Discharging Nurse: [facility] Unit/Room#: 2071/4791-59  Discharging Unit Phone Number: ***    Emergency Contact:   Extended Emergency Contact Information  Primary Emergency Contact: [address] [place] [address] Phone: [phone]  Relation: Spouse  Secondary Emergency Contact: Morgan Jo  [place] Phone: [phone]  Relation: Child    Past Surgical History:  No past surgical history on file. Immunization History: There is no immunization history on file for this patient.     Active Problems:  Patient Active Problem List   Diagnosis Code    Diffuse large B cell lymphoma ([facility] 75.) C83.30       Isolation/Infection:   Isolation          No Isolation        Patient Infection Status     None to display          Nurse Assessment:  Last Vital Signs: /76   Pulse 80   Temp 97.2 °F (36.2 °C) (Oral)   Resp 16   Ht 5' 11\" (1.803 m)   Wt 243 lb 9.6 oz (110.5 kg)   SpO2 99%   BMI 33.98 kg/m²     Last documented pain score (0-10 scale): Pain Level: 0  Last Weight:   Wt Readings from Last 1 Encounters:   20 243 lb 9.6 oz (110.5 kg)     Mental Status:  oriented and alert    IV Access:  - Central Venous Catheter  - site  right, insertion date: ***    Nursing Mobility/ADLs:  Walking   Independent  Transfer  Independent  Bathing  Independent  Dressing [facility]  Independent  Med Delivery   none    Wound Care Documentation and Therapy:        Elimination:  Continence:   · Bowel: Yes  · Bladder: Yes  Urinary Catheter: None   Colostomy/Ileostomy/Ileal Conduit: No       Date of Last BM: ***    Intake/Output Summary (Last 24 hours) at 8/25/2020 0920  Last data filed at 8/25/2020 0830  Gross per 24 hour   Intake 360 ml   Output 1850 ml   Net -1490 ml     I/O last 3 completed shifts: In: 480 [P.O.:480]  Out: 1600 [Urine:1600]    Safety Concerns:     None    Impairments/Disabilities:      None    Nutrition Therapy:  Current Nutrition Therapy:   - Oral Diet:  General    Routes of Feeding: Oral  Liquids: No Restrictions  Daily Fluid Restriction: no  Last Modified Barium Swallow with Video (Video Swallowing Test): not done    Treatments at the Time of Hospital Discharge:   Respiratory Treatments: ***  Oxygen Therapy:  is not on home oxygen therapy.   Ventilator:    - No ventilator support    Rehab Therapies: {THERAPEUTIC INTERVENTION:0340341674}  Weight Bearing Status/Restrictions: No weight bearing restirctions  Other Medical Equipment (for information only, NOT a DME order):  {EQUIPMENT:778435404}  Other Treatments: ***    Patient's personal belongings (please select all that are sent with patient):  None    RN SIGNATURE:  Electronically signed by Ina Mcgarry RN on 8/25/20 at 9:23 AM EDT    CASE MANAGEMENT/SOCIAL WORK SECTION    Inpatient Status Date: ***    Readmission Risk Assessment Score:  Readmission Risk              Risk of Unplanned Readmission:        16           Discharging to Facility/ Agency   · Name:   · Address:  · Phone:  · Fax:    Dialysis Facility (if applicable)   · Name:  · Address:  · Dialysis Schedule:  · Phone:  · Fax:    / signature: {Esignature:001038910}    PHYSICIAN SECTION    Prognosis: {Prognosis:2686302812}    Condition at Discharge: Myra Castillo Patient Condition:116361418}    Rehab Potential (if transferring to Rehab): {Prognosis:2904464087}    Recommended Labs or Other Treatments After Discharge: ***    Physician Certification: I certify the above information and transfer of Cari Salgado  is necessary for the continuing treatment of the diagnosis listed and that he requires {Admit to Appropriate Level of Care:09005} for {GREATER/LESS:282951710} 30 days.      Update Admission H&P: {CHP DME Changes in MDIZ}    PHYSICIAN SIGNATURE:  {Esignature:[id number]}

## 2020-08-25 NOTE — PROGRESS NOTES
Pt with orders to discharge. Discharge instructions discussed with pt and pt's wife and all questions answered. Pt escorted to car by this RN without complication.

## 2020-08-25 NOTE — CARE COORDINATION
Case Management Assessment            Discharge Note                    Date / Time of Note: 8/25/2020 9:18 AM                  Discharge Note Completed by: Parvin Torre    Patient Name: Sylvie Vega   YOB: 1940  Diagnosis: Neurotoxicity [R29.90]   Date / Time: 8/18/2020  2:53 PM    Current PCP: Claus Ovalle MD  Clinic patient: Yes    Hospitalization in the last 30 days: Yes    Advance Directives:  Code Status: Full Code  [place] DNR form completed and on chart: No    Financial:  Payor: Jasbir Joses / Plan: MEDICARE PART A AND B / Product Type: *No Product type* /      Pharmacy:    [address] [phone] Carmita Weber [phone]  [address]  Phone: [phone] Fax: [phone]      Assistance purchasing medications?: Potential Assistance Purchasing Medications: No  Assistance provided by Case Management: None at this time    Does patient want to participate in local refill/ meds to beds program?: No    Meds To Beds General Rules:  1. Can ONLY be done Monday- Friday between 8:30am-5pm  2. Prescription(s) must be in pharmacy by 3pm to be filled same day  3. Copy of patient's insurance/ prescription drug card and patient face sheet must be sent along with the prescription(s)  4. Cost of Rx cannot be added to hospital bill. If financial assistance is needed, please contact unit  or ;  or  CANNOT provide pharmacy voucher for patients co-pays  5.  Patients can then  the prescription on their way out of the hospital at discharge, or pharmacy can deliver to the bedside if staff is available. (payment due at time of pick-up or delivery - cash, check, or card accepted)     Able to afford home medications/ co-pay costs: Yes    ADLS:  Current PT AM-PAC Score: 23 /24  Current OT AM-PAC Score:   /24      DISCHARGE Disposition: Home- No Services Needed    LOC at discharge plan Yes    Freedom of choice list was provided with basic dialogue that supports the patient's individualized plan of care/goals and shares the quality data associated with the providers.  Not Indicated    Care Transitions patient: Lisa Junior  Case Management Department  Ph: [phone]  Fax: [phone]

## 2020-08-26 LAB — BETA-2 MICROGLOBULIN: 2.7 MG/L (ref 1.1–2.4)

## 2020-09-09 ENCOUNTER — HOSPITAL ENCOUNTER (OUTPATIENT)
Dept: CT IMAGING | Age: 80
Discharge: HOME | End: 2020-09-09
Payer: COMMERCIAL

## 2020-09-09 PROCEDURE — 71260 CT THORAX DX C+: CPT

## 2020-09-09 PROCEDURE — 6360000004 HC RX CONTRAST MEDICATION: Performed by: INTERNAL MEDICINE

## 2020-09-09 PROCEDURE — 70491 CT SOFT TISSUE NECK W/DYE: CPT

## 2020-09-09 RX ADMIN — IOHEXOL 50 ML: 240 INJECTION, SOLUTION INTRATHECAL; INTRAVASCULAR; INTRAVENOUS; ORAL at 09:58

## 2020-09-09 RX ADMIN — IOPAMIDOL 100 ML: 755 INJECTION, SOLUTION INTRAVENOUS at 09:58

## 2020-09-21 LAB
CULTURE, FUNGUS BLOOD: NORMAL
CULTURE, FUNGUS BLOOD: NORMAL
FUNGUS (MYCOLOGY) CULTURE: ABNORMAL
FUNGUS (MYCOLOGY) CULTURE: ABNORMAL
FUNGUS STAIN: ABNORMAL
FUNGUS STAIN: ABNORMAL
ORGANISM: ABNORMAL
ORGANISM: ABNORMAL

## 2020-11-04 ENCOUNTER — HOSPITAL ENCOUNTER (OUTPATIENT)
Dept: CT IMAGING | Age: 80
Discharge: HOME | End: 2020-11-04
Payer: MEDICARE

## 2020-11-04 PROCEDURE — 71260 CT THORAX DX C+: CPT

## 2020-11-04 PROCEDURE — 6360000004 HC RX CONTRAST MEDICATION: Performed by: INTERNAL MEDICINE

## 2020-11-04 PROCEDURE — 82565 ASSAY OF CREATININE: CPT

## 2020-11-04 PROCEDURE — 70491 CT SOFT TISSUE NECK W/DYE: CPT

## 2020-11-04 RX ADMIN — IOHEXOL 50 ML: 240 INJECTION, SOLUTION INTRATHECAL; INTRAVASCULAR; INTRAVENOUS; ORAL at 11:29

## 2020-11-04 RX ADMIN — IOPAMIDOL 80 ML: 755 INJECTION, SOLUTION INTRAVENOUS at 11:37

## 2021-03-03 ENCOUNTER — HOSPITAL ENCOUNTER (OUTPATIENT)
Dept: CT IMAGING | Age: 81
Discharge: HOME | End: 2021-03-03
Payer: MEDICARE

## 2021-03-03 DIAGNOSIS — C83.33 DIFFUSE LARGE B-CELL LYMPHOMA OF INTRA-ABDOMINAL LYMPH NODES (HCC): ICD-10-CM

## 2021-03-03 DIAGNOSIS — Z00.6 CLINICAL TRIAL EXAM: ICD-10-CM

## 2021-03-03 LAB
GFR AFRICAN AMERICAN: >60
GFR NON-AFRICAN AMERICAN: >60
PERFORMED ON: NORMAL
POC CREATININE: 1 MG/DL (ref 0.8–1.3)
POC SAMPLE TYPE: NORMAL

## 2021-03-03 PROCEDURE — 70491 CT SOFT TISSUE NECK W/DYE: CPT

## 2021-03-03 PROCEDURE — 6360000004 HC RX CONTRAST MEDICATION: Performed by: INTERNAL MEDICINE

## 2021-03-03 PROCEDURE — 74177 CT ABD & PELVIS W/CONTRAST: CPT

## 2021-03-03 PROCEDURE — 82565 ASSAY OF CREATININE: CPT

## 2021-03-03 RX ADMIN — IOPAMIDOL 100 ML: 755 INJECTION, SOLUTION INTRAVENOUS at 09:43

## 2021-03-03 RX ADMIN — IOHEXOL 50 ML: 240 INJECTION, SOLUTION INTRATHECAL; INTRAVASCULAR; INTRAVENOUS; ORAL at 09:43

## 2021-05-06 ENCOUNTER — APPOINTMENT (OUTPATIENT)
Dept: CT IMAGING | Age: 81
DRG: 300 | End: 2021-05-06
Payer: MEDICARE

## 2021-05-06 ENCOUNTER — HOSPITAL ENCOUNTER (INPATIENT)
Age: 81
LOS: 2 days | Discharge: HOME | DRG: 300 | End: 2021-05-08
Attending: EMERGENCY MEDICINE | Admitting: INTERNAL MEDICINE
Payer: MEDICARE

## 2021-05-06 ENCOUNTER — APPOINTMENT (OUTPATIENT)
Dept: GENERAL RADIOLOGY | Age: 81
DRG: 300 | End: 2021-05-06
Payer: MEDICARE

## 2021-05-06 DIAGNOSIS — M79.89 LEG SWELLING: Primary | ICD-10-CM

## 2021-05-06 PROBLEM — I82.4Z2 DVT, LOWER EXTREMITY, DISTAL, ACUTE, LEFT (HCC): Status: ACTIVE | Noted: 2021-05-06

## 2021-05-06 LAB
ANION GAP SERPL CALCULATED.3IONS-SCNC: 14 MMOL/L (ref 3–16)
BASOPHILS ABSOLUTE: 0 K/UL (ref 0–0.2)
BASOPHILS RELATIVE PERCENT: 0.2 %
BUN BLDV-MCNC: 25 MG/DL (ref 7–20)
CALCIUM SERPL-MCNC: 9.1 MG/DL (ref 8.3–10.6)
CHLORIDE BLD-SCNC: 99 MMOL/L (ref 99–110)
CO2: 25 MMOL/L (ref 21–32)
CREAT SERPL-MCNC: 1 MG/DL (ref 0.8–1.3)
EOSINOPHILS ABSOLUTE: 0 K/UL (ref 0–0.6)
EOSINOPHILS RELATIVE PERCENT: 0.1 %
GFR AFRICAN AMERICAN: >60
GFR NON-AFRICAN AMERICAN: >60
GLUCOSE BLD-MCNC: 141 MG/DL (ref 70–99)
HCT VFR BLD CALC: 38.2 % (ref 40.5–52.5)
HEMOGLOBIN: 13.1 G/DL (ref 13.5–17.5)
LYMPHOCYTES ABSOLUTE: 0.6 K/UL (ref 1–5.1)
LYMPHOCYTES RELATIVE PERCENT: 4.9 %
MCH RBC QN AUTO: 34 PG (ref 26–34)
MCHC RBC AUTO-ENTMCNC: 34.3 G/DL (ref 31–36)
MCV RBC AUTO: 99.2 FL (ref 80–100)
MONOCYTES ABSOLUTE: 1.3 K/UL (ref 0–1.3)
MONOCYTES RELATIVE PERCENT: 11 %
NEUTROPHILS ABSOLUTE: 10.3 K/UL (ref 1.7–7.7)
NEUTROPHILS RELATIVE PERCENT: 83.8 %
PDW BLD-RTO: 13.4 % (ref 12.4–15.4)
PLATELET # BLD: 168 K/UL (ref 135–450)
PMV BLD AUTO: 8.9 FL (ref 5–10.5)
POTASSIUM REFLEX MAGNESIUM: 4.4 MMOL/L (ref 3.5–5.1)
PRO-BNP: 333 PG/ML (ref 0–449)
RBC # BLD: 3.86 M/UL (ref 4.2–5.9)
SODIUM BLD-SCNC: 138 MMOL/L (ref 136–145)
TROPONIN: <0.01 NG/ML
WBC # BLD: 12.3 K/UL (ref 4–11)

## 2021-05-06 PROCEDURE — 1200000000 HC SEMI PRIVATE

## 2021-05-06 PROCEDURE — 99283 EMERGENCY DEPT VISIT LOW MDM: CPT

## 2021-05-06 PROCEDURE — 6360000004 HC RX CONTRAST MEDICATION: Performed by: PHYSICIAN ASSISTANT

## 2021-05-06 PROCEDURE — 84484 ASSAY OF TROPONIN QUANT: CPT

## 2021-05-06 PROCEDURE — 96372 THER/PROPH/DIAG INJ SC/IM: CPT

## 2021-05-06 PROCEDURE — 6360000002 HC RX W HCPCS: Performed by: PHYSICIAN ASSISTANT

## 2021-05-06 PROCEDURE — 71045 X-RAY EXAM CHEST 1 VIEW: CPT

## 2021-05-06 PROCEDURE — 71275 CT ANGIOGRAPHY CHEST: CPT

## 2021-05-06 PROCEDURE — 36415 COLL VENOUS BLD VENIPUNCTURE: CPT

## 2021-05-06 PROCEDURE — 85025 COMPLETE CBC W/AUTO DIFF WBC: CPT

## 2021-05-06 PROCEDURE — 80048 BASIC METABOLIC PNL TOTAL CA: CPT

## 2021-05-06 PROCEDURE — 83880 ASSAY OF NATRIURETIC PEPTIDE: CPT

## 2021-05-06 RX ORDER — POTASSIUM CHLORIDE 750 MG/1
10 CAPSULE, EXTENDED RELEASE ORAL DAILY
COMMUNITY

## 2021-05-06 RX ORDER — ACETAMINOPHEN 650 MG/1
650 SUPPOSITORY RECTAL EVERY 6 HOURS PRN
Status: DISCONTINUED | OUTPATIENT
Start: 2021-05-06 | End: 2021-05-07

## 2021-05-06 RX ORDER — GLIMEPIRIDE 2 MG/1
2 TABLET ORAL
COMMUNITY

## 2021-05-06 RX ORDER — ACETAMINOPHEN 325 MG/1
650 TABLET ORAL EVERY 6 HOURS PRN
Status: DISCONTINUED | OUTPATIENT
Start: 2021-05-06 | End: 2021-05-07

## 2021-05-06 RX ORDER — FUROSEMIDE 20 MG/1
20 TABLET ORAL DAILY
COMMUNITY

## 2021-05-06 RX ADMIN — IOPAMIDOL 80 ML: 755 INJECTION, SOLUTION INTRAVENOUS at 19:41

## 2021-05-06 RX ADMIN — ENOXAPARIN SODIUM 100 MG: 100 INJECTION SUBCUTANEOUS at 22:00

## 2021-05-06 ASSESSMENT — PAIN DESCRIPTION - PAIN TYPE: TYPE: ACUTE PAIN

## 2021-05-06 ASSESSMENT — ENCOUNTER SYMPTOMS
NAUSEA: 0
SHORTNESS OF BREATH: 1
VOMITING: 0
EYE REDNESS: 0
ABDOMINAL PAIN: 0
DIARRHEA: 0
SORE THROAT: 0
BACK PAIN: 0
WHEEZING: 0
COLOR CHANGE: 1

## 2021-05-06 ASSESSMENT — PAIN DESCRIPTION - ORIENTATION: ORIENTATION: LEFT

## 2021-05-06 ASSESSMENT — PAIN DESCRIPTION - LOCATION: LOCATION: LEG

## 2021-05-06 ASSESSMENT — PAIN DESCRIPTION - DESCRIPTORS: DESCRIPTORS: ACHING

## 2021-05-06 ASSESSMENT — PAIN SCALES - GENERAL: PAINLEVEL_OUTOF10: 4

## 2021-05-06 ASSESSMENT — PAIN DESCRIPTION - ONSET: ONSET: ON-GOING

## 2021-05-06 NOTE — ED PROVIDER NOTES
[facility] ENCOUNTER          PHYSICIAN ASSISTANT NOTE       Date of evaluation: 5/6/2021    Chief Complaint     Fatigue      History of Present Illness     Chevy Hutchison is a 79 y.o. male who presents with complaint of leg swelling and fatigue. Patient states 2 years ago he had swelling in his leg when he was diagnosed with lymphoma. He has since been told he was cancer free in November 2020 after being in a trial of immunotherapy. Patient states in the last 3 or 4 days he has had increased swelling in his legs left greater than right. He has also had pain that is occasionally like a cramp in his calf or his thigh. States the pain is spontaneous and denies alleviating factors. States his pain feels worse when he is ambulating. Today he walked from the bedroom to the bathroom and his daughter noted that he was short of breath. He states that because of the pain he holds his breath when he is walking and that he was not short of breath. 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 He denies chest pain, recent fevers or chills, abdominal pain, nausea or vomiting. Review of Systems     Review of Systems   Constitutional: Negative for chills and fever. HENT: Negative for sore throat. Eyes: Negative for redness. Respiratory: Positive for shortness of breath. Negative for wheezing. Cardiovascular: Positive for leg swelling (L>R). Negative for chest pain. Gastrointestinal: Negative for abdominal pain, diarrhea, nausea and vomiting. Genitourinary: Negative for difficulty urinating. Musculoskeletal: Positive for myalgias. Negative for back pain. Skin: Positive for color change. Negative for wound. Neurological: Negative for weakness, light-headedness, numbness and headaches. Hematological: Does not bruise/bleed easily. Psychiatric/Behavioral: Negative for confusion. All other systems reviewed and are negative. Past Medical, Surgical, Family, and Social History     He has no past medical history on file. He has no past surgical history on file. His family history is not on file. He reports that he has never smoked. He has never used smokeless tobacco.    Medications     Current Discharge Medication List      CONTINUE these medications which have NOT CHANGED    Details   furosemide (LASIX) 20 MG tablet Take 20 mg by mouth daily      potassium chloride (MICRO-K) 10 MEQ extended release capsule Take 10 mEq by mouth daily      glimepiride (AMARYL) 2 MG tablet Take 2 mg by mouth every morning (before breakfast)      Cholecalciferol (VITAMIN D3) 125 MCG (5000 UT) TABS Take by mouth daily      Multiple Vitamins-Minerals (THERAPEUTIC MULTIVITAMIN-MINERALS) tablet Take 1 tablet by mouth daily      vitamin C (ASCORBIC ACID) 500 MG tablet Take 500 mg by mouth daily             Allergies     He has No Known Allergies. Physical Exam     INITIAL VITALS: BP: (!) 151/88, Temp: 97.6 °F (36.4 °C), Pulse: (!) 9, Resp: 20, SpO2: 100 %  Physical Exam  Vitals signs and nursing note reviewed. Constitutional:       General: He is not in acute distress. Appearance: He is well-developed. He is not diaphoretic. HENT:      Head: Normocephalic and atraumatic. Eyes:      Conjunctiva/sclera: Conjunctivae normal.   Neck:      Musculoskeletal: Normal range of motion. Cardiovascular:      Rate and Rhythm: Normal rate and regular rhythm. Heart sounds: Normal heart sounds. Pulmonary:      Effort: Pulmonary effort is normal. No respiratory distress. Breath sounds: Normal breath sounds. No wheezing, rhonchi or rales. Abdominal:      Palpations: Abdomen is soft. Tenderness: There is no abdominal tenderness. Musculoskeletal: Normal range of motion. General: No tenderness. Right lower leg: Edema present. Left lower leg: Edema (erythema without warmth or tenderness of the lower leg) present. Skin:     General: Skin is warm and dry. Neurological:      General: No focal deficit present. Mental Status: He is alert and oriented to person, place, and time. Psychiatric:         Behavior: Behavior normal.         Thought Content: Thought content normal.         Judgment: Judgment normal.         Diagnostic Results         RADIOLOGY:  CTA PULMONARY W CONTRAST   Final Result      1. No evidence of pulmonary embolism to the segmental level.              XR CHEST PORTABLE   Final Result      No acute disease      VL Extremity Venous Bilateral    (Results Pending)       LABS:   Results for orders placed or performed during the hospital encounter of 05/06/21   CBC Auto Differential   Result Value Ref Range    WBC 12.3 (H) 4.0 - 11.0 K/uL    RBC 3.86 (L) 4.20 - 5.90 M/uL    Hemoglobin 13.1 (L) 13.5 - 17.5 g/dL    Hematocrit 38.2 (L) 40.5 - 52.5 %    MCV 99.2 80.0 - 100.0 fL    MCH 34.0 26.0 - 34.0 pg    MCHC 34.3 31.0 - 36.0 g/dL    RDW 13.4 12.4 - 15.4 %    Platelets 379 024 - 105 K/uL    MPV 8.9 5.0 - 10.5 fL    Neutrophils % 83.8 %    Lymphocytes % 4.9 %    Monocytes % 11.0 %    Eosinophils % 0.1 %    Basophils % 0.2 %    Neutrophils Absolute 10.3 (H) 1.7 - 7.7 K/uL    Lymphocytes Absolute 0.6 (L) 1.0 - 5.1 K/uL    Monocytes Absolute 1.3 0.0 - 1.3 K/uL    Eosinophils Absolute 0.0 0.0 - 0.6 K/uL    Basophils Absolute 0.0 0.0 - 0.2 K/uL   Basic Metabolic Panel w/ Reflex to MG   Result Value Ref Range    Sodium 138 136 - 145 mmol/L    Potassium reflex Magnesium 4.4 3.5 - 5.1 mmol/L    Chloride 99 99 - 110 mmol/L    CO2 25 21 - 32 mmol/L    Anion Gap 14 3 - 16    Glucose 141 (H) 70 - 99 mg/dL    BUN 25 (H) 7 - 20 mg/dL    CREATININE 1.0 0.8 - 1.3 mg/dL    GFR Non-African American >60 >60    GFR African American >60 >60    Calcium 9.1 8.3 - 10.6 mg/dL   Brain Natriuretic Peptide   Result Value Ref Range    Pro- 0 - 449 pg/mL   Troponin   Result Value Ref Range    Troponin <0.01 <0.01 ng/mL       ED BEDSIDE ULTRASOUND:      RECENT VITALS:  BP: 130/75, Temp: 97.6 °F (36.4 °C), Pulse: 86, Resp: 18, SpO2: 97 %     Procedures         ED Course     Nursing Notes, Past Medical Hx,Past Surgical Hx, Social Hx, Allergies, and Family Hx were reviewed. The patient was given the following medications:  Orders Placed This Encounter   Medications    iopamidol (ISOVUE-370) 76 % injection 80 mL    enoxaparin (LOVENOX) injection 100 mg    sodium chloride flush 0.9 % injection 5-40 mL    sodium chloride flush 0.9 % injection 5-40 mL    0.9 % sodium chloride infusion    OR Linked Order Group     promethazine (PHENERGAN) tablet 12.5 mg     ondansetron (ZOFRAN) injection 4 mg    polyethylene glycol (GLYCOLAX) packet 17 g    OR Linked Order Group     acetaminophen (TYLENOL) tablet 650 mg     acetaminophen (TYLENOL) suppository 650 mg    therapeutic multivitamin-minerals 1 tablet    insulin lispro (1 Unit Dial) 0-6 Units    insulin lispro (1 Unit Dial) 0-3 Units    glucose (GLUTOSE) 40 % oral gel 15 g    dextrose 50 % IV solution    glucagon (rDNA) injection 1 mg    dextrose 5 % solution    enoxaparin (LOVENOX) injection 100 mg       CONSULTS:  IP CONSULT TO HOSPITALIST  IP CONSULT TO Darshan / [employer] / Loren Idris is a 79 y.o. male who presents emergency department with bilateral lower extremity swelling. Patient has had fatigue with ambulation and daughter also noted that he was short of breath after short walk to the bathroom this evening. Patient has a history of lymphoma and was told he was cancer free 6 months ago and was told that he could stop taking the apixaban for history of VTE since it was cancer provoked. Patient had CBC with mild leukocytosis, however, difficult to evaluate due to patient's history of leukopenia and previous labs. H&H 13.1 and 38.2.   BMP with a BUN of 25.  proBNP 333 and a normal troponin. Chest x-ray no acute disease. CTPA obtained due to patient's shortness of breath and concern for DVT which showed no evidence of PE to the segmental level. Patient was ambulated and was unable to walk unassisted in the emergency department. He usually does not need any ambulating devices but required a walker in the emergency department due to pain in his leg. Concern for DVT and his pain patient was given dose of Lovenox and was admitted for ultrasound and PT OT evaluation and recommendations. I do not feel patient has a cellulitis or infectious process. Patient was accepted by hospitalist for continued management. He is in agreement the plan. This patient was also evaluated by the attending physician. All care plans were discussed and agreed upon. Clinical Impression     1. Leg swelling        Disposition     PATIENT REFERRED TO:  No follow-up provider specified.     DISCHARGE MEDICATIONS:  Current Discharge Medication List          DISPOSITION Admitted 05/06/2021 10:35:45 PM        [facility] [place]  05/07/21 5889

## 2021-05-07 ENCOUNTER — APPOINTMENT (OUTPATIENT)
Dept: CT IMAGING | Age: 81
DRG: 300 | End: 2021-05-07
Payer: MEDICARE

## 2021-05-07 ENCOUNTER — APPOINTMENT (OUTPATIENT)
Dept: VASCULAR LAB | Age: 81
DRG: 300 | End: 2021-05-07
Payer: MEDICARE

## 2021-05-07 LAB
ANION GAP SERPL CALCULATED.3IONS-SCNC: 10 MMOL/L (ref 3–16)
BASOPHILS ABSOLUTE: 0 K/UL (ref 0–0.2)
BASOPHILS RELATIVE PERCENT: 0.4 %
BUN BLDV-MCNC: 22 MG/DL (ref 7–20)
CALCIUM SERPL-MCNC: 8.9 MG/DL (ref 8.3–10.6)
CHLORIDE BLD-SCNC: 102 MMOL/L (ref 99–110)
CO2: 26 MMOL/L (ref 21–32)
CREAT SERPL-MCNC: 0.9 MG/DL (ref 0.8–1.3)
D DIMER: 3090 NG/ML DDU (ref 0–229)
EOSINOPHILS ABSOLUTE: 0 K/UL (ref 0–0.6)
EOSINOPHILS RELATIVE PERCENT: 0.3 %
GFR AFRICAN AMERICAN: >60
GFR NON-AFRICAN AMERICAN: >60
GLUCOSE BLD-MCNC: 119 MG/DL (ref 70–99)
GLUCOSE BLD-MCNC: 143 MG/DL (ref 70–99)
GLUCOSE BLD-MCNC: 150 MG/DL (ref 70–99)
GLUCOSE BLD-MCNC: 156 MG/DL (ref 70–99)
GLUCOSE BLD-MCNC: 228 MG/DL (ref 70–99)
HCT VFR BLD CALC: 36.6 % (ref 40.5–52.5)
HEMOGLOBIN: 12.4 G/DL (ref 13.5–17.5)
LYMPHOCYTES ABSOLUTE: 0.8 K/UL (ref 1–5.1)
LYMPHOCYTES RELATIVE PERCENT: 10.4 %
MCH RBC QN AUTO: 33.8 PG (ref 26–34)
MCHC RBC AUTO-ENTMCNC: 34 G/DL (ref 31–36)
MCV RBC AUTO: 99.7 FL (ref 80–100)
MONOCYTES ABSOLUTE: 1.2 K/UL (ref 0–1.3)
MONOCYTES RELATIVE PERCENT: 14.1 %
NEUTROPHILS ABSOLUTE: 6.1 K/UL (ref 1.7–7.7)
NEUTROPHILS RELATIVE PERCENT: 74.8 %
PDW BLD-RTO: 13.3 % (ref 12.4–15.4)
PERFORMED ON: ABNORMAL
PLATELET # BLD: 165 K/UL (ref 135–450)
PMV BLD AUTO: 9.3 FL (ref 5–10.5)
POTASSIUM REFLEX MAGNESIUM: 3.8 MMOL/L (ref 3.5–5.1)
RBC # BLD: 3.67 M/UL (ref 4.2–5.9)
SODIUM BLD-SCNC: 138 MMOL/L (ref 136–145)
WBC # BLD: 8.1 K/UL (ref 4–11)

## 2021-05-07 PROCEDURE — 93970 EXTREMITY STUDY: CPT

## 2021-05-07 PROCEDURE — 80048 BASIC METABOLIC PNL TOTAL CA: CPT

## 2021-05-07 PROCEDURE — 6370000000 HC RX 637 (ALT 250 FOR IP): Performed by: STUDENT IN AN ORGANIZED HEALTH CARE EDUCATION/TRAINING PROGRAM

## 2021-05-07 PROCEDURE — 1200000000 HC SEMI PRIVATE

## 2021-05-07 PROCEDURE — 6360000002 HC RX W HCPCS

## 2021-05-07 PROCEDURE — 85025 COMPLETE CBC W/AUTO DIFF WBC: CPT

## 2021-05-07 PROCEDURE — 6370000000 HC RX 637 (ALT 250 FOR IP): Performed by: INTERNAL MEDICINE

## 2021-05-07 PROCEDURE — 85379 FIBRIN DEGRADATION QUANT: CPT

## 2021-05-07 PROCEDURE — 2580000003 HC RX 258: Performed by: STUDENT IN AN ORGANIZED HEALTH CARE EDUCATION/TRAINING PROGRAM

## 2021-05-07 PROCEDURE — 74176 CT ABD & PELVIS W/O CONTRAST: CPT

## 2021-05-07 PROCEDURE — 36415 COLL VENOUS BLD VENIPUNCTURE: CPT

## 2021-05-07 RX ORDER — DEXTROSE MONOHYDRATE 50 MG/ML
100 INJECTION, SOLUTION INTRAVENOUS PRN
Status: DISCONTINUED | OUTPATIENT
Start: 2021-05-07 | End: 2021-05-08 | Stop reason: HOSPADM

## 2021-05-07 RX ORDER — ACETAMINOPHEN 650 MG/1
650 SUPPOSITORY RECTAL EVERY 6 HOURS PRN
Status: DISCONTINUED | OUTPATIENT
Start: 2021-05-07 | End: 2021-05-08 | Stop reason: HOSPADM

## 2021-05-07 RX ORDER — DEXTROSE MONOHYDRATE 25 G/50ML
12.5 INJECTION, SOLUTION INTRAVENOUS PRN
Status: DISCONTINUED | OUTPATIENT
Start: 2021-05-07 | End: 2021-05-08 | Stop reason: HOSPADM

## 2021-05-07 RX ORDER — ONDANSETRON 2 MG/ML
4 INJECTION INTRAMUSCULAR; INTRAVENOUS EVERY 6 HOURS PRN
Status: DISCONTINUED | OUTPATIENT
Start: 2021-05-07 | End: 2021-05-08 | Stop reason: HOSPADM

## 2021-05-07 RX ORDER — SODIUM CHLORIDE 9 MG/ML
25 INJECTION, SOLUTION INTRAVENOUS PRN
Status: DISCONTINUED | OUTPATIENT
Start: 2021-05-07 | End: 2021-05-08 | Stop reason: HOSPADM

## 2021-05-07 RX ORDER — INSULIN LISPRO 100 [IU]/ML
0-3 INJECTION, SOLUTION INTRAVENOUS; SUBCUTANEOUS NIGHTLY
Status: DISCONTINUED | OUTPATIENT
Start: 2021-05-07 | End: 2021-05-08 | Stop reason: HOSPADM

## 2021-05-07 RX ORDER — ACETAMINOPHEN 325 MG/1
650 TABLET ORAL EVERY 6 HOURS PRN
Status: DISCONTINUED | OUTPATIENT
Start: 2021-05-07 | End: 2021-05-08 | Stop reason: HOSPADM

## 2021-05-07 RX ORDER — SODIUM CHLORIDE 0.9 % (FLUSH) 0.9 %
5-40 SYRINGE (ML) INJECTION EVERY 12 HOURS SCHEDULED
Status: DISCONTINUED | OUTPATIENT
Start: 2021-05-07 | End: 2021-05-08 | Stop reason: HOSPADM

## 2021-05-07 RX ORDER — M-VIT,TX,IRON,MINS/CALC/FOLIC 27MG-0.4MG
1 TABLET ORAL DAILY
Status: DISCONTINUED | OUTPATIENT
Start: 2021-05-07 | End: 2021-05-08 | Stop reason: HOSPADM

## 2021-05-07 RX ORDER — SODIUM CHLORIDE 0.9 % (FLUSH) 0.9 %
5-40 SYRINGE (ML) INJECTION PRN
Status: DISCONTINUED | OUTPATIENT
Start: 2021-05-07 | End: 2021-05-08 | Stop reason: HOSPADM

## 2021-05-07 RX ORDER — POLYETHYLENE GLYCOL 3350 17 G/17G
17 POWDER, FOR SOLUTION ORAL DAILY PRN
Status: DISCONTINUED | OUTPATIENT
Start: 2021-05-07 | End: 2021-05-08 | Stop reason: HOSPADM

## 2021-05-07 RX ORDER — PROMETHAZINE HYDROCHLORIDE 12.5 MG/1
12.5 TABLET ORAL EVERY 6 HOURS PRN
Status: DISCONTINUED | OUTPATIENT
Start: 2021-05-07 | End: 2021-05-08 | Stop reason: HOSPADM

## 2021-05-07 RX ORDER — NICOTINE POLACRILEX 4 MG
15 LOZENGE BUCCAL PRN
Status: DISCONTINUED | OUTPATIENT
Start: 2021-05-07 | End: 2021-05-08 | Stop reason: HOSPADM

## 2021-05-07 RX ORDER — INSULIN LISPRO 100 [IU]/ML
0-6 INJECTION, SOLUTION INTRAVENOUS; SUBCUTANEOUS
Status: DISCONTINUED | OUTPATIENT
Start: 2021-05-07 | End: 2021-05-08 | Stop reason: HOSPADM

## 2021-05-07 RX ADMIN — Medication 5 ML: at 09:45

## 2021-05-07 RX ADMIN — ENOXAPARIN SODIUM 100 MG: 100 INJECTION SUBCUTANEOUS at 20:54

## 2021-05-07 RX ADMIN — INSULIN LISPRO 2 UNITS: 100 INJECTION, SOLUTION INTRAVENOUS; SUBCUTANEOUS at 12:26

## 2021-05-07 RX ADMIN — INSULIN LISPRO 1 UNITS: 100 INJECTION, SOLUTION INTRAVENOUS; SUBCUTANEOUS at 17:38

## 2021-05-07 RX ADMIN — ACETAMINOPHEN 650 MG: 325 TABLET ORAL at 00:23

## 2021-05-07 RX ADMIN — ENOXAPARIN SODIUM 100 MG: 100 INJECTION SUBCUTANEOUS at 09:45

## 2021-05-07 RX ADMIN — ACETAMINOPHEN 650 MG: 325 TABLET ORAL at 14:14

## 2021-05-07 RX ADMIN — MULTIPLE VITAMINS W/ MINERALS TAB 1 TABLET: TAB at 09:45

## 2021-05-07 RX ADMIN — Medication 10 ML: at 20:53

## 2021-05-07 ASSESSMENT — PAIN DESCRIPTION - DESCRIPTORS: DESCRIPTORS: SHARP

## 2021-05-07 ASSESSMENT — ENCOUNTER SYMPTOMS
ABDOMINAL PAIN: 0
CHEST TIGHTNESS: 0
NAUSEA: 0
SHORTNESS OF BREATH: 0

## 2021-05-07 ASSESSMENT — PAIN DESCRIPTION - ONSET: ONSET: ON-GOING

## 2021-05-07 ASSESSMENT — PAIN SCALES - GENERAL
PAINLEVEL_OUTOF10: 0
PAINLEVEL_OUTOF10: 0

## 2021-05-07 ASSESSMENT — PAIN DESCRIPTION - PROGRESSION: CLINICAL_PROGRESSION: GRADUALLY WORSENING

## 2021-05-07 ASSESSMENT — PAIN DESCRIPTION - LOCATION: LOCATION: LEG

## 2021-05-07 NOTE — PROGRESS NOTES
List of Home Medications the patient is currently taking is complete. Home Medication list in EPIC updated to reflect changes noted below. Source of medications in list is the patients wife. The following medications were added to admission medication list:  Vitamin D    The following medications were removed from admission medication list:  Eliquis  Levetiracetam  Metformin  Ondansetron  Fluconazole  Valacyclovir     Please call with questions. Jair Mcdermott  PharmD Candidate Class of [employer] [phone]  5/6/2021 10:41 PM      No current facility-administered medications on file prior to encounter.       Current Outpatient Medications on File Prior to Encounter   Medication Sig    furosemide (LASIX) 20 MG tablet Take 20 mg by mouth daily    potassium chloride (MICRO-K) 10 MEQ extended release capsule Take 10 mEq by mouth daily    glimepiride (AMARYL) 2 MG tablet Take 2 mg by mouth every morning (before breakfast)    Cholecalciferol (VITAMIN D3) 125 MCG (5000 UT) TABS Take by mouth daily    Multiple Vitamins-Minerals (THERAPEUTIC MULTIVITAMIN-MINERALS) tablet Take 1 tablet by mouth daily    vitamin C (ASCORBIC ACID) 500 MG tablet Take 500 mg by mouth daily

## 2021-05-07 NOTE — PROGRESS NOTES
Internal Medicine PGY-1 Resident Progress Note     Patient Name: Denton Escudero (MRN: [id number])  PCP: Christine Alva MD  Date of Admission: 5/6/2021    Chief Complaint: Edema    Subjective   Mr. Denton Escudero is a 79 y.o. male with a PMHx of DLBCL, DVT, T2DM who p/w lower extremity swelling. Interval History:  Patient is comfortable this morning. Denies any acute symptoms. Imaging consistent with extensive left sided DVT. Patient is not the best historian but is aware of his care and medical history overall. Patient continues to endorse that he first noticed the extent of his swelling about 2-3 days ago but when asked about what his leg looked like 2 weeks ago, he wasn't fully certain that there was significant change. Of note he did report leg swelling to his PCP who started him on diuretics. Review of Systems:  Review of Systems   Constitutional: Negative for fatigue and fever. Eyes: Positive for visual disturbance (at baseline). Respiratory: Negative for chest tightness and shortness of breath. Cardiovascular: Positive for leg swelling. Negative for chest pain and palpitations. Gastrointestinal: Negative for abdominal pain and nausea. Neurological: Negative for weakness and headaches. Objective     Vitals:/75   Pulse 79   Temp 98.2 °F (36.8 °C) (Oral)   Resp 18   Ht 5' 11\" (1.803 m)   Wt 240 lb 8.4 oz (109.1 kg)   SpO2 99%   BMI 33.55 kg/m²     I/Os:    Intake/Output Summary (Last 24 hours) at 5/7/2021 0801  Last data filed at 5/7/2021 0703  Gross per 24 hour   Intake 100 ml   Output 0 ml   Net 100 ml       Physical Exam:  Physical Exam  Constitutional:       General: He is not in acute distress. Eyes:      General: No scleral icterus. Extraocular Movements: Extraocular movements intact. Conjunctiva/sclera: Conjunctivae normal.      Comments: Possible mild dyssynchrony when looking at [facility]. Cardiovascular:      Rate and Rhythm: Normal rate and regular rhythm. report. Lower extremity venous duplex studies are pending at this time. 3. Nonspecific circumferential bladder wall thickening which may be secondary to chronic outlet obstruction due to enlarged prostate. CTA PULMONARY W CONTRAST   Final Result      1. No evidence of pulmonary embolism to the segmental level. XR CHEST PORTABLE   Final Result      No acute disease          Assessment/Plan   Mr. Usha Wayne is a 79 y.o. male presenting with bilateral lower extremity swelling. 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 CT abdomen/pelvis w/o contrast without progressive adenopathy or mass of the abdomen (stable para-aortic nodes) and DVT of LLE involving the external iliac and common femoral. Dopplers similarly shows extensive L sided DVTs. CTPE w/o PE.  - on therapeutic Lovenox (100 mg BID)  - Consulting IR for potential EKOS  - Elevate legs  - Oncology consult; appreciate recs     Diffuse Large B-cell Lymphoma   - In remission as of 11/2020  - Followed up Dr. India Pierre of [address] [place] [address]  - Oncology consult; appreciate recs     Type II Diabetes Mellitus: patient denies that he has DM. Does monitor diet.  Most recent HgbA1c 6.1% per chart review/patient.  - LDSSI  - Hypoglycemic protocols     Leukocytosis, improved: likely reactive in setting of DVT, improved on morning labs    Active Hospital Problems    Diagnosis Date Noted    DVT, lower extremity, distal, acute, left ([facility] [address] [I82.4Z2] 05/06/2021       DVT Prophylaxis: as above  Diet: DIET LOW SODIUM 2 GM;  Code Status: Full Code    PT/OT EvalStatus: Not consulted    Dispo - GMF    I will discuss the patient with the senior resident and MD Anthony Godinez MD  PGY-1, Internal Medicine  Contact via [facility]

## 2021-05-07 NOTE — PLAN OF CARE
Problem: Venous Thromboembolism:  Goal: Will show no signs or symptoms of venous thromboembolism  Description: Will show no signs or symptoms of venous thromboembolism  Outcome: Ongoing  Note: Pt + for DVT in LLE. Pt has 4+ pitting edema to LLE, 3+ to RLE. Pt c/o pain to affected leg. D-dimer 3090. Pt receiving therapeutic dose of lovenox, not a candidate for EKOS per Dr. Haydee Garcia.

## 2021-05-07 NOTE — CONSULTS
History and recent hospital course reviewed. My opinion and recommendations were discussed with . Shakira Collins. Exam : bilateral lower extremity pitting edema and chronic venous stasis changes, worse on the left and progressive per the patient    Long term preservation of venous valves is not a reason for pharmacomechanical thrombectomy given Mr. Marie Singh age. In addition, his leg has no evidence of phlegmasia or other cause to suspect that the leg is threatened. Aggressive thrombus removal with mechanical thrombectomy and thrombolysis is not indicated at this time given the additional risks of thrombolysis. I believe that anticoagulation is the appropriate treatment. This was discussed with Dr. Majo Choe.     Rebekah Granger

## 2021-05-07 NOTE — CONSULTS
[address]                                  CONSULTATION    PATIENT NAME: Kia Lovelace                   :        1940  MED REC NO:   [id number]                          ROOM:       [id number]  ACCOUNT NO:   [de-identified]                           ADMIT DATE: 2021  PROVIDER:     Albert Krueger MD    CONSULT DATE:  2021    REASON FOR REFERRAL:  Left lower extremity DVT. HISTORY OF PRESENT ILLNESS:  The patient is an 59-year-old male with  multiple underlying medical problems including history of diffuse large  B-cell non-Hodgkin's lymphoma, post multiple lines of systemic therapy  including CAR-T on 2020 while enrolled on a research clinical  trial.  His treating physician is Dr. Edelmira Robert. His treating  oncologist is  _____. Throughout his treatment for diffuse large  B-cell non-Hodgkin's lymphoma, the patient developed bilateral pulmonary  emboli and hence was treated via anticoagulation. His anticoagulation  was recently stopped per the patient earlier in the year by one of his treating physcians. Unfortunately, he recently developed left lower extremity swelling and  tenderness, hence was seen through the Emergency Department at [facility]..  On initial evaluation, he has a fairly normal CBC and CMP. An ultrasound of his lower extremity revealed evidence of acute totally  occluding deep venous thrombosis involving the left common femoral, deep  femoral, femoro-posterior tibial, peroneal and soleal veins. With these  findings, he was started on anticoagulation with Lovenox and was  admitted for further observation. PAST MEDICAL HISTORY:  1.  Diabetes mellitus. 2.  History of acute chronic injury, now resolved. PAST SURGICAL HISTORY:  None. SOCIAL HISTORY:  Denies tobacco, alcohol, or illicit drug use.     FAMILY HISTORY:  No history of bleeding or clotting diathesis. MEDICATIONS:  Reviewed in Epic. REVIEW OF SYSTEMS:  Reports ongoing left lower extremity swelling and  tenderness although the latter has improved over the last few hours. His PO intake is good. He has no chest pain, shortness of breath,  change in bowel habits, or  symptoms. The remainder of his review of  systems is negative. PHYSICAL EXAMINATION:  GENERAL:  He is an elderly frail man, in no acute distress. VITAL SIGNS:  Blood pressure 123/76, heart rate of 97, respiratory rate  16, temperature is 98 degrees. HEENT:  Pupils are equal and reactive. Oral mucosas are intact. RESPIRATORY:  Clear to auscultation bilaterally. CARDIOVASCULAR:  Regular rate and rhythm. ABDOMEN:  Nontender. EXTREMITIES:  With clear asymmetry with left being clearly larger than  right with mild tenderness along the inner aspect of his mid thigh down  to his leg. He has some pitting edema around that area as well. SKIN:  No rash or dermatitis. LYMPHATICS:  No palpable submental, submandibular, cervical,  supraclavicular, or axillary lymphadenopathy. LABORATORY DATA:  As described in the HPI. ASSESSMENT AND PLAN:  This is an 60-year-old male with multiple  underlying medical problems including history of relapse, diffuse large  b-cell non-Hodgkin's lymphoma, post CAR-T (08/2020). He has recovered  well from that, but now has a recurrent thrombotic episode now involving  his left lower extremity. He has earned himself lifelong  anticoagulation. He is currently on Lovenox, but would transition over  to Eliquis to continue that indefinitely. The only reason not to  anticoagulate would be if at fall risk, actively bleeding, and certainly  will need to hold for any elective procedures. This was discussed with  the patient and multiple questions were addressed to that end. In terms of his diffuse large B-cell, his most recent restaging CTs are  from March.   Apparently, he has restaging scans within the near future  and will follow up with both Dr. Namita Red and Dr. Maris Alfaro for those. Thank you for this interesting consult and please do not hesitate to  contact me for questions or concerns regarding the patient's evaluation.         Samuel Hua MD    D: 05/07/2021 10:45:39       T: 05/07/2021 12:06:54     [email or web address]  Job#: [id number]     Doc#: [id number]    CC:

## 2021-05-07 NOTE — H&P
Internal Medicine  PGY 1  History & Physical      CC: swelling of legs     HistoryObtained From:  patient    HISTORY OF PRESENT ILLNESS:  Hattie Sierra is a 79 y.o. male with PMH of DLBCL, DVT, T2DM who p/w lower extremity swelling. Patient states over the past 3-4 days, he has noticed increased swelling of his lower legs (L>R). He endorses pain when he ambulates. No alleviating factors. The pain progressed to the point where he could not walk to the bathroom from his bed. Patient was diagnosed with diffuse large b-cell lymphoma 2 years ago. He received CAR T-cell treatment and went into remission back in 11/2020. During his treatment course, he developed bilateral PE, for which he was started on Eliquis. He recently saw his oncologist back in February this year and was cleared to discontinue his Eliquis. He denies any chest pain, shortness of breath, abdominal pain, fever, chills, nausea or vomiting. ED course:  Vitals: Hypertensive (151/88), Tachypneic (20), SpO2 100% RA  Exam: Bilateral erythema of LE, 3+ pitting edema LLE, 2+ RLE, no focal deficits, no wheezing or abnormal lung sounds, normal cardiac exam  Labs: CBC: Leukocytosis (12.3), troponin & BNP (-)  Imaging: CTPA: (-) PE, CXR (-)      Past Medical History:    History reviewed. No pertinent past medical history. Past Surgical History:    History reviewed. No pertinent surgical history.     Medications Prior to Admission:    · Medications Prior to Admission: furosemide (LASIX) 20 MG tablet, Take 20 mg by mouth daily  · potassium chloride (MICRO-K) 10 MEQ extended release capsule, Take 10 mEq by mouth daily  · glimepiride (AMARYL) 2 MG tablet, Take 2 mg by mouth every morning (before breakfast)  · Cholecalciferol (VITAMIN D3) 125 MCG (5000 UT) TABS, Take by mouth daily  · Multiple Vitamins-Minerals (THERAPEUTIC MULTIVITAMIN-MINERALS) tablet, Take 1 tablet by mouth daily  · vitamin C (ASCORBIC ACID) 500 MG tablet, Take 500 mg by mouth daily  ·   Allergies:  Patient has no known allergies. Social History:   · TOBACCO: reports that he has never smoked. He has never used smokeless tobacco.  · ETOH:   has no history on file for alcohol. · DRUGS : no use  · Patient currently lives with family. Family History:   · History reviewed. No pertinent family history. Review of Systems: A 10 point review of systems was conducted, significant findings as noted in HPI. Physical Exam  Constitutional:       Appearance: Normal appearance. He is overweight. HENT:      Mouth/Throat:      Mouth: Mucous membranes are moist.      Pharynx: Oropharynx is clear. Neck:      Musculoskeletal: Normal range of motion and neck supple. Cardiovascular:      Rate and Rhythm: Normal rate and regular rhythm. Pulses: Normal pulses. Heart sounds: Normal heart sounds. Pulmonary:      Effort: Pulmonary effort is normal.      Breath sounds: Normal breath sounds. Abdominal:      General: Bowel sounds are normal.      Palpations: Abdomen is soft. Musculoskeletal:      Right lower leg: Edema (2+) present. Left lower leg: Edema (3+) present. Skin:     General: Skin is warm and dry. Capillary Refill: Capillary refill takes less than 2 seconds. Findings: Erythema (bilateral lower extremities) present. Neurological:      General: No focal deficit present. Mental Status: He is alert and oriented to person, place, and time. Mental status is at baseline. Vitals:    05/07/21 0049   BP: 130/75   Pulse: 86   Resp: 18   Temp: 97.6 °F (36.4 °C)   SpO2: 97%       DATA:    Labs:  BMP:   Recent Labs     05/06/21 1915      K 4.4   CL 99   CO2 25   BUN 25*   CREATININE 1.0   GLUCOSE 141*     CBC:   Recent Labs     05/06/21 1915   WBC 12.3*   HGB 13.1*   HCT 38.2*          LFT's: No results for input(s): AST, ALT, ALB, BILITOT, ALKPHOS in the last 72 hours.   Troponin:   Recent Labs     05/06/21 1915   TROPONINI <0.01 BNP: No results for input(s): BNP in the last 72 hours. ABGs: No results for input(s): PHART, KKM1IBF, PO2ART in the last 72 hours. INR: No results for input(s): INR in the last 72 hours. U/A:No results for input(s): NITRITE, COLORU, PHUR, LABCAST, WBCUA, RBCUA, MUCUS, TRICHOMONAS, YEAST, BACTERIA, CLARITYU, SPECGRAV, LEUKOCYTESUR, UROBILINOGEN, BILIRUBINUR, BLOODU, GLUCOSEU, AMORPHOUS in the last 72 hours. Invalid input(s): KETONESU    CTA PULMONARY W CONTRAST   Final Result      1. No evidence of pulmonary embolism to the segmental level. XR CHEST PORTABLE   Final Result      No acute disease      VL Extremity Venous Bilateral    (Results Pending)       ASSESSMENT AND PLAN:  Carla Eason is a 79 y.o. male who presents with lower extremity swelling and admitted for suspected DVT.     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 Renetta Owusu of [address] [place] [address]  - Oncology consult; appreciate recs    Type II Diabetes Mellitus   - LDSSI  - Hypoglycemic protocols     Code Status: Full Code  FEN: DIET LOW SODIUM 2 GM;  PPX: Lovenox  DISPO: GMF      This patient will be discussed with attending, Patrice Tineo MD.    Jeanette Peng MD, PGY- 1  5/7/2021,  1:57 AM

## 2021-05-07 NOTE — ED PROVIDER NOTES
ED Attending Attestation Note     Date of evaluation: 5/6/2021    This patient was seen by the advance practice provider. I have seen and examined the patient, agree with the workup, evaluation, management and diagnosis. The care plan has been discussed. I have reviewed the ECG and concur with the ABHINAV's interpretation. My assessment reveals a history of lymphoma on experimental therapy. Developed pain in his left leg. In the past he had presented with right leg swelling from his lymphoma. He is not anticoagulated. Apparently he was short of breath but he attributes it is due to the pain in his leg.   He is awake alert there is edema noted his leg neurovascularly intact     Ulises De La Cruz MD  05/06/21 4698

## 2021-05-08 VITALS
TEMPERATURE: 97.4 F | HEART RATE: 71 BPM | SYSTOLIC BLOOD PRESSURE: 130 MMHG | HEIGHT: 71 IN | BODY MASS INDEX: 33.67 KG/M2 | RESPIRATION RATE: 18 BRPM | DIASTOLIC BLOOD PRESSURE: 77 MMHG | OXYGEN SATURATION: 98 % | WEIGHT: 240.52 LBS

## 2021-05-08 LAB
ANION GAP SERPL CALCULATED.3IONS-SCNC: 13 MMOL/L (ref 3–16)
BASOPHILS ABSOLUTE: 0 K/UL (ref 0–0.2)
BASOPHILS RELATIVE PERCENT: 0.4 %
BILIRUBIN URINE: NEGATIVE
BLOOD, URINE: NEGATIVE
BUN BLDV-MCNC: 20 MG/DL (ref 7–20)
CALCIUM SERPL-MCNC: 8.6 MG/DL (ref 8.3–10.6)
CHLORIDE BLD-SCNC: 100 MMOL/L (ref 99–110)
CLARITY: CLEAR
CO2: 24 MMOL/L (ref 21–32)
COLOR: YELLOW
CREAT SERPL-MCNC: 0.9 MG/DL (ref 0.8–1.3)
EOSINOPHILS ABSOLUTE: 0 K/UL (ref 0–0.6)
EOSINOPHILS RELATIVE PERCENT: 0.4 %
GFR AFRICAN AMERICAN: >60
GFR NON-AFRICAN AMERICAN: >60
GLUCOSE BLD-MCNC: 147 MG/DL (ref 70–99)
GLUCOSE BLD-MCNC: 157 MG/DL (ref 70–99)
GLUCOSE URINE: NEGATIVE MG/DL
HCT VFR BLD CALC: 34.2 % (ref 40.5–52.5)
HEMOGLOBIN: 11.7 G/DL (ref 13.5–17.5)
KETONES, URINE: 15 MG/DL
LEUKOCYTE ESTERASE, URINE: NEGATIVE
LYMPHOCYTES ABSOLUTE: 0.5 K/UL (ref 1–5.1)
LYMPHOCYTES RELATIVE PERCENT: 7.1 %
MCH RBC QN AUTO: 34.2 PG (ref 26–34)
MCHC RBC AUTO-ENTMCNC: 34.3 G/DL (ref 31–36)
MCV RBC AUTO: 99.6 FL (ref 80–100)
MICROSCOPIC EXAMINATION: YES
MONOCYTES ABSOLUTE: 0.8 K/UL (ref 0–1.3)
MONOCYTES RELATIVE PERCENT: 11.9 %
MUCUS: ABNORMAL /LPF
NEUTROPHILS ABSOLUTE: 5.5 K/UL (ref 1.7–7.7)
NEUTROPHILS RELATIVE PERCENT: 80.2 %
NITRITE, URINE: NEGATIVE
PDW BLD-RTO: 13.3 % (ref 12.4–15.4)
PERFORMED ON: ABNORMAL
PH UA: 5.5 (ref 5–8)
PLATELET # BLD: 174 K/UL (ref 135–450)
PMV BLD AUTO: 9.1 FL (ref 5–10.5)
POTASSIUM REFLEX MAGNESIUM: 3.6 MMOL/L (ref 3.5–5.1)
PROTEIN UA: 30 MG/DL
RBC # BLD: 3.44 M/UL (ref 4.2–5.9)
RBC UA: ABNORMAL /HPF (ref 0–4)
SODIUM BLD-SCNC: 137 MMOL/L (ref 136–145)
SPECIFIC GRAVITY UA: 1.02 (ref 1–1.03)
URINE REFLEX TO CULTURE: ABNORMAL
URINE TYPE: ABNORMAL
UROBILINOGEN, URINE: 1 E.U./DL
WBC # BLD: 6.9 K/UL (ref 4–11)
WBC UA: ABNORMAL /HPF (ref 0–5)

## 2021-05-08 PROCEDURE — 81001 URINALYSIS AUTO W/SCOPE: CPT

## 2021-05-08 PROCEDURE — 36415 COLL VENOUS BLD VENIPUNCTURE: CPT

## 2021-05-08 PROCEDURE — 2580000003 HC RX 258: Performed by: STUDENT IN AN ORGANIZED HEALTH CARE EDUCATION/TRAINING PROGRAM

## 2021-05-08 PROCEDURE — 80048 BASIC METABOLIC PNL TOTAL CA: CPT

## 2021-05-08 PROCEDURE — 85025 COMPLETE CBC W/AUTO DIFF WBC: CPT

## 2021-05-08 PROCEDURE — 6360000002 HC RX W HCPCS

## 2021-05-08 PROCEDURE — 6370000000 HC RX 637 (ALT 250 FOR IP): Performed by: STUDENT IN AN ORGANIZED HEALTH CARE EDUCATION/TRAINING PROGRAM

## 2021-05-08 PROCEDURE — 6370000000 HC RX 637 (ALT 250 FOR IP): Performed by: INTERNAL MEDICINE

## 2021-05-08 RX ADMIN — INSULIN LISPRO 1 UNITS: 100 INJECTION, SOLUTION INTRAVENOUS; SUBCUTANEOUS at 08:56

## 2021-05-08 RX ADMIN — Medication 10 ML: at 08:56

## 2021-05-08 RX ADMIN — MULTIPLE VITAMINS W/ MINERALS TAB 1 TABLET: TAB at 08:56

## 2021-05-08 RX ADMIN — ACETAMINOPHEN 650 MG: 325 TABLET ORAL at 01:25

## 2021-05-08 RX ADMIN — ENOXAPARIN SODIUM 100 MG: 100 INJECTION SUBCUTANEOUS at 09:00

## 2021-05-08 ASSESSMENT — PAIN DESCRIPTION - PAIN TYPE: TYPE: ACUTE PAIN

## 2021-05-08 ASSESSMENT — PAIN SCALES - GENERAL: PAINLEVEL_OUTOF10: 0

## 2021-05-08 ASSESSMENT — PAIN DESCRIPTION - ORIENTATION: ORIENTATION: LEFT

## 2021-05-08 ASSESSMENT — PAIN DESCRIPTION - PROGRESSION: CLINICAL_PROGRESSION: NOT CHANGED

## 2021-05-08 ASSESSMENT — PAIN DESCRIPTION - LOCATION: LOCATION: LEG

## 2021-05-08 NOTE — CARE COORDINATION
Case Management Assessment           Initial Evaluation                Date / Time of Evaluation: 5/8/2021 10:20 AM                 Assessment Completed by: Joni Junk Day    Patient Name: Demetrio Gu     YOB: 1940  Diagnosis: DVT, lower extremity, distal, acute, left [facility]) [I82.4Z2]     Date / Time: 5/6/2021  6:23 PM    Patient Admission Status: Inpatient    If patient is discharged prior to next notation, then this note serves as note for discharge by case management. Current PCP: Arcelia Livingston MD  Clinic Patient: No    Chart Reviewed: Yes  Patient/ Family Interviewed: Yes    Initial assessment completed at bedside with: Patient and family     Hospitalization in the last 30 days: No    Emergency Contacts:  Extended Emergency Contact Information  Primary Emergency Contact: [address] [place] [address] Phone: [phone]  Relation: Spouse  Secondary Emergency Contact: Félix Alvarenga  Start Phone: [phone]  Relation: Child    Advance Directives:   Code Status: Full [address]: No    Financial:  Payor: Ilya  / Plan: MEDICARE PART A AND B / Product Type: *No Product type* /     Pre-cert required for SNF: Yes    Pharmacy:    [facility] [place] [phone]  [address]  Phone: [phone] Fax: [phone]      Potential assistance Purchasing Medications:    Does Patient want to participate in local refill/ meds to beds program?:      Meds To Beds General Rules:  1. Can ONLY be done Monday- Friday between 8:30am-5pm  2. Prescription(s) must be in pharmacy by 3pm to be filled same day  3. Copy of patient's insurance/ prescription drug card and patient face sheet must be sent along with the prescription(s)  4. Cost of Rx cannot be added to hospital bill.  If financial assistance is needed, please contact unit  or ;  or  CANNOT provide pharmacy voucher for patients co-pays  5. Patients can then  the prescription on their way out of the hospital at discharge, or pharmacy can deliver to the bedside if staff is available. (payment due at time of pick-up or delivery - cash, check, or card accepted)     Able to afford home medications/ co-pay costs: Yes    ADLS:  Support Systems:      PT AM-PAC:   /24  OT AM-PAC:   /24    Housing:  Home Environment: Home with family support   Steps: yes     Plans to RETURN to current housing: Yes  Barrier(s) to RETURNING to current housing: none     Home Care Information:  Currently ACTIVE with [address] [address]: No    Currently ACTIVE with [place] [facility]: No    Durable Medical Equipment:  DME Provider: n/a   Equipment: cane and walker    Home Oxygen and Respiratory Equipment:  Has [address] [employer] prior to admission: No  [address]: Not Applicable     Dialysis:  Active with HD/PD prior to admission: No    DISCHARGE PLAN:  Disposition: Home    Transportation PLAN for discharge: family     Factors facilitating achievement of predicted outcomes: Family support, Motivated, Cooperative, Pleasant and Has needed Durable Medical Equipment at home    Barriers to discharge: none     Additional Case Management Notes:   JESSICA met with patient and family at bedside on 5/7. Patient is from home with his spouse. He is normally fully independent at baseline. Patient has a cane and walker, but was only using them recently due to leg pain. Patient can complete ADLs at home, but has support from wife if needed. Patient plans to return home at discharge. Patient and family denied any home care needs. JESSICA provided contact information to patient/family and placed information on white board in room should needs arise. No other needs noted at this time.        The Plan for Transition of Care is related to the following treatment goals DVT, lower extremity, distal, acute, left ([facility] [address] [I82.4Z2]      The Patient and/or patient representative was provided with a choice of provider and agrees with the discharge plan Not Indicated    Freedom of choice list was provided with basic dialogue that supports the patient's individualized plan of care/goals and shares the quality data associated with the providers.  Yes    Care Transition patient: No    BAO Blankenship  [facility] [address] [place] [address]   Case Management Department  Ph: [phone]

## 2021-05-08 NOTE — PLAN OF CARE
Problem: Pain:  Goal: Pain level will decrease  Description: Pain level will decrease  5/8/2021 1049 by Ama Moore RN  Outcome: Ongoing  Note: Pt denies pain at the moment- will monitor.

## 2021-05-08 NOTE — PROGRESS NOTES
Discussed discharge papers with pt and his wife and both verbalized understanding that eliquis dose will be different in 7 days, verbalized understanding of other meds, follow up visits including CT, education on DVT, and also included information on diabetic meal planning. Pt and his wife stated that someone came by and spoke with them about diabetes as per wife's request. No distress noted. Iv and telemetry removed. Belongings sent with pt including his own walker.

## 2021-05-08 NOTE — FLOWSHEET NOTE
05/07/21 1515   Encounter Summary   Services provided to: Patient and family together   Referral/Consult From: Rounding   Continue Visiting   (5/7/21, GENNY.)   Complexity of Encounter Moderate   Length of Encounter 15 minutes   Routine   Type Initial   Assessment Calm; Approachable   Intervention Active listening;Nurtured hope   Outcome Expressed gratitude;Engaged in conversation

## 2021-05-08 NOTE — DISCHARGE SUMMARY
INTERNAL MEDICINE DEPARTMENT AT [address] [place] [address]  DISCHARGE SUMMARY    Patient ID: Frankie Craig                                             Discharge Date: 5/8/2021   Patient's PCP: Prakash Gomes MD                                          Discharge Physician: Elder Mar MD  Admit Date: 5/6/2021   Admitting Physician: Oriana Kimbrough MD    DISCHARGE DIAGNOSES:  1- Left Deep Venous Thrombosis    Chronic, Stable Medical Conditions, POA:  1. Diffuse Large B-Cell Lymphoma  2. Type II Diabetes Melitus    Hospital Course:   Frankie Craig is a 79 y.o. male with PMH of DLBCL, DVT, T2DM who p/w lower extremity swelling. Mr. Nikhil Fischer states that over the past 3-4 days, he has noticed increased swelling of his lower legs (L>R), but does not appear fully sure. He endorses pain when he ambulates, though ambulation has been limited overall. The pain progressed to the point where he could not walk to the bathroom from his bed. On admission he denied any chest pain, shortness of breath, abdominal pain, fever, chills, nausea or vomiting. Of note, Mr. Nikhil Fischer was diagnosed with diffuse large b-cell lymphoma 2 years ago. He received CAR T-cell treatment and went into remission back in 11/2020. During his treatment course, he developed bilateral PE and DVT, for which he was started on Eliquis. He recently saw his oncologist back in February this year and was cleared to discontinue his Eliquis given that they believed his DVT to be provoked and he has remained stable with improvement of his lymphoma. CTPA did not reveal acute pulmonary embolism. CT abdomen/pelvis and lower extremity dopplers were consistent with extensive thrombus involving virtually all of his deep veins of his left leg all the way up to the L external iliac vein. Patient was discussed with hematology/oncology and IR and they recommended continued anticoagulation for treatment with Eliquis. He was treated inpatient with therapeutic LMWH. On the date of discharge, the patient reported feeling stable. The patient was found to not be in any acute distress, with vital signs within normal limits, and no new abnormalities on physical examination. Further, the patient expressed appropriate understanding of, and agreement with, the discharge recommendations, medications, and plan. Physical Exam:  /70   Pulse 79   Temp 97.7 °F (36.5 °C) (Oral)   Resp 18   Ht 5' 11\" (1.803 m)   Wt 240 lb 8.4 oz (109.1 kg)   SpO2 97%   BMI 33.55 kg/m²   General appearance: alert, appears stated age and cooperative  Head: Normocephalic, without obvious abnormality, atraumatic  Eyes: conjunctivae/corneas clear. EOM's intact. Has some baseline reduction in L visual acuity (not measured)  Throat: lips, mucosa, and tongue normal; teeth and gums normal  Neck: supple, symmetrical, trachea midline  Lungs: clear to auscultation bilaterally  Heart: regular rate and rhythm, S1, S2 normal, no murmur, click, rub or gallop  Abdomen: soft, non-tender; bowel sounds normal; no masses,  no organomegaly. Baseline distension. Extremities: Bilateral lower extremity edema, more prominent on the L leg with 2-3+ pitting edema. Legs are erythematous. Sensation and motor function grossly intact (at baseline). Minimal pain. Neurologic: Grossly normal    Laboratory Results:  Recent Labs     05/06/21  1915 05/07/21  0649 05/08/21  0623   WBC 12.3* 8.1 6.9   HGB 13.1* 12.4* 11.7*   HCT 38.2* 36.6* 34.2*    165 174   MCV 99.2 99.7 99.6     Recent Labs     05/07/21  0649   DDIMER 3090*     Radiology:  VL Extremity Venous Bilateral   Final Result      CT ABDOMEN PELVIS WO CONTRAST Additional Contrast? None   Final Result   1. No progressive adenopathy of the abdomen or pelvis, particularly involving the retroperitoneum. Stable para-aortic nodes and luna conglomerates dating back to multiple prior studies.    2. Deep venous thrombosis of the left lower extremity involving the left common femoral and left external iliac veins. This was discussed with BAHMAN Rojas at the time of report. Lower extremity venous duplex studies are pending at this time. 3. Nonspecific circumferential bladder wall thickening which may be secondary to chronic outlet obstruction due to enlarged prostate. CTA PULMONARY W CONTRAST   Final Result      1. No evidence of pulmonary embolism to the segmental level. XR CHEST PORTABLE   Final Result      No acute disease        Consults: Oncology, IR  Disposition: home  Discharged Condition: Stable  Follow Up: Primary Care Physician in one week, oncology as directed.      DISCHARGE MEDICATION:     Medication List      ASK your doctor about these medications    furosemide 20 MG tablet  Commonly known as: LASIX     glimepiride 2 MG tablet  Commonly known as: AMARYL     potassium chloride 10 MEQ extended release capsule  Commonly known as: MICRO-K     therapeutic multivitamin-minerals tablet     vitamin C 500 MG tablet  Commonly known as: ASCORBIC ACID     Vitamin D3 125 MCG (5000 UT) Tabs          Activity: activity as tolerated, maintain leg elevation when sitting  Diet: regular diet, encourage low carb  Wound Care: as directed    Time Spent on discharge is more than 30 minutes    Signed:  Jessy Harley MD   Internal Medicine, PGY1  5/8/2021

## 2021-05-08 NOTE — PROGRESS NOTES
Pt complained of pain to left leg, medicated with Tylenol 650 mg po as ordered. Pt refused to elevate leg, stated it hurts worse elevated. Vital signs stable, afebrile. Will continue to monitor alteration in comfort.

## 2021-05-13 ENCOUNTER — HOSPITAL ENCOUNTER (OUTPATIENT)
Dept: CT IMAGING | Age: 81
Discharge: HOME | End: 2021-05-13
Payer: MEDICARE

## 2021-05-13 DIAGNOSIS — C83.33 DIFFUSE LARGE B-CELL LYMPHOMA OF INTRA-ABDOMINAL LYMPH NODES (HCC): ICD-10-CM

## 2021-05-13 DIAGNOSIS — Z00.6 EXAM FOR CLINICAL TRIAL: ICD-10-CM

## 2021-05-13 PROCEDURE — 6360000004 HC RX CONTRAST MEDICATION: Performed by: INTERNAL MEDICINE

## 2021-05-13 PROCEDURE — 71260 CT THORAX DX C+: CPT

## 2021-05-13 PROCEDURE — 70491 CT SOFT TISSUE NECK W/DYE: CPT

## 2021-05-13 RX ADMIN — IOHEXOL 50 ML: 240 INJECTION, SOLUTION INTRATHECAL; INTRAVASCULAR; INTRAVENOUS; ORAL at 12:34

## 2021-05-13 RX ADMIN — IOPAMIDOL 100 ML: 755 INJECTION, SOLUTION INTRAVENOUS at 12:34

## 2021-08-25 ENCOUNTER — HOSPITAL ENCOUNTER (OUTPATIENT)
Dept: CT IMAGING | Age: 81
Discharge: HOME | End: 2021-08-25
Payer: MEDICARE

## 2021-08-25 DIAGNOSIS — C83.33 DIFFUSE LARGE B-CELL LYMPHOMA OF INTRA-ABDOMINAL LYMPH NODES (HCC): ICD-10-CM

## 2021-08-25 LAB
GFR AFRICAN AMERICAN: >60
GFR NON-AFRICAN AMERICAN: >60
PERFORMED ON: NORMAL
POC CREATININE: 0.9 MG/DL (ref 0.8–1.3)
POC SAMPLE TYPE: NORMAL

## 2021-08-25 PROCEDURE — 74177 CT ABD & PELVIS W/CONTRAST: CPT

## 2021-08-25 PROCEDURE — 6360000004 HC RX CONTRAST MEDICATION: Performed by: FAMILY MEDICINE

## 2021-08-25 PROCEDURE — 70491 CT SOFT TISSUE NECK W/DYE: CPT

## 2021-08-25 PROCEDURE — 82565 ASSAY OF CREATININE: CPT

## 2021-08-25 RX ORDER — HEPARIN SODIUM (PORCINE) LOCK FLUSH IV SOLN 100 UNIT/ML 100 UNIT/ML
500 SOLUTION INTRAVENOUS PRN
Status: DISCONTINUED | OUTPATIENT
Start: 2021-08-25 | End: 2021-08-26 | Stop reason: HOSPADM

## 2021-08-25 RX ADMIN — IOHEXOL 50 ML: 240 INJECTION, SOLUTION INTRATHECAL; INTRAVASCULAR; INTRAVENOUS; ORAL at 14:57

## 2021-08-25 RX ADMIN — IOPAMIDOL 100 ML: 755 INJECTION, SOLUTION INTRAVENOUS at 14:59

## 2021-11-03 ENCOUNTER — HOSPITAL ENCOUNTER (OUTPATIENT)
Dept: CT IMAGING | Age: 81
Discharge: HOME | End: 2021-11-03
Payer: MEDICARE

## 2021-11-03 DIAGNOSIS — C83.30 DIFFUSE LARGE B-CELL LYMPHOMA, UNSPECIFIED BODY REGION (HCC): ICD-10-CM

## 2021-11-03 PROCEDURE — 82565 ASSAY OF CREATININE: CPT

## 2021-11-03 PROCEDURE — 70491 CT SOFT TISSUE NECK W/DYE: CPT

## 2021-11-03 PROCEDURE — 6360000002 HC RX W HCPCS: Performed by: RADIOLOGY

## 2021-11-03 PROCEDURE — 6360000004 HC RX CONTRAST MEDICATION: Performed by: INTERNAL MEDICINE

## 2021-11-03 PROCEDURE — 71260 CT THORAX DX C+: CPT

## 2021-11-03 RX ORDER — HEPARIN SODIUM (PORCINE) LOCK FLUSH IV SOLN 100 UNIT/ML 100 UNIT/ML
500 SOLUTION INTRAVENOUS PRN
Status: DISCONTINUED | OUTPATIENT
Start: 2021-11-03 | End: 2021-11-04 | Stop reason: HOSPADM

## 2021-11-03 RX ADMIN — HEPARIN 500 UNITS: 100 SYRINGE at 12:33

## 2021-11-03 RX ADMIN — IOHEXOL 50 ML: 240 INJECTION, SOLUTION INTRATHECAL; INTRAVASCULAR; INTRAVENOUS; ORAL at 12:31

## 2021-11-03 RX ADMIN — IOPAMIDOL 100 ML: 755 INJECTION, SOLUTION INTRAVENOUS at 12:31

## 2021-11-07 ENCOUNTER — HOSPITAL ENCOUNTER (EMERGENCY)
Age: 81
Discharge: HOME | End: 2021-11-07
Attending: EMERGENCY MEDICINE
Payer: MEDICARE

## 2021-11-07 VITALS
RESPIRATION RATE: 18 BRPM | SYSTOLIC BLOOD PRESSURE: 156 MMHG | DIASTOLIC BLOOD PRESSURE: 96 MMHG | HEART RATE: 76 BPM | OXYGEN SATURATION: 99 % | TEMPERATURE: 97.8 F

## 2021-11-07 DIAGNOSIS — G51.39 FACIAL SPASM: ICD-10-CM

## 2021-11-07 DIAGNOSIS — R20.0 PERIORAL NUMBNESS: Primary | ICD-10-CM

## 2021-11-07 DIAGNOSIS — B37.2 INTERTRIGO OF GENITOCRURAL REGION DUE TO CANDIDA SPECIES: ICD-10-CM

## 2021-11-07 LAB
ALBUMIN SERPL-MCNC: 4 G/DL (ref 3.4–5)
ALP BLD-CCNC: 67 U/L (ref 40–129)
ALT SERPL-CCNC: 12 U/L (ref 10–40)
ANION GAP SERPL CALCULATED.3IONS-SCNC: 10 MMOL/L (ref 3–16)
AST SERPL-CCNC: 25 U/L (ref 15–37)
BASOPHILS ABSOLUTE: 0 K/UL (ref 0–0.2)
BASOPHILS RELATIVE PERCENT: 0.7 %
BILIRUB SERPL-MCNC: 0.4 MG/DL (ref 0–1)
BILIRUBIN DIRECT: <0.2 MG/DL (ref 0–0.3)
BILIRUBIN URINE: NEGATIVE
BILIRUBIN, INDIRECT: NORMAL MG/DL (ref 0–1)
BLOOD, URINE: NEGATIVE
BUN BLDV-MCNC: 23 MG/DL (ref 7–20)
CALCIUM SERPL-MCNC: 8.6 MG/DL (ref 8.3–10.6)
CHLORIDE BLD-SCNC: 103 MMOL/L (ref 99–110)
CLARITY: CLEAR
CO2: 24 MMOL/L (ref 21–32)
COLOR: YELLOW
CREAT SERPL-MCNC: 0.9 MG/DL (ref 0.8–1.3)
EOSINOPHILS ABSOLUTE: 0.1 K/UL (ref 0–0.6)
EOSINOPHILS RELATIVE PERCENT: 3.1 %
GFR AFRICAN AMERICAN: >60
GFR NON-AFRICAN AMERICAN: >60
GLUCOSE BLD-MCNC: 123 MG/DL (ref 70–99)
GLUCOSE URINE: NEGATIVE MG/DL
HCT VFR BLD CALC: 40.6 % (ref 40.5–52.5)
HEMOGLOBIN: 13.5 G/DL (ref 13.5–17.5)
KETONES, URINE: NEGATIVE MG/DL
LEUKOCYTE ESTERASE, URINE: NEGATIVE
LYMPHOCYTES ABSOLUTE: 0.9 K/UL (ref 1–5.1)
LYMPHOCYTES RELATIVE PERCENT: 18.5 %
MCH RBC QN AUTO: 33.3 PG (ref 26–34)
MCHC RBC AUTO-ENTMCNC: 33.3 G/DL (ref 31–36)
MCV RBC AUTO: 100.3 FL (ref 80–100)
MICROSCOPIC EXAMINATION: NORMAL
MONOCYTES ABSOLUTE: 0.5 K/UL (ref 0–1.3)
MONOCYTES RELATIVE PERCENT: 11.2 %
NEUTROPHILS ABSOLUTE: 3.2 K/UL (ref 1.7–7.7)
NEUTROPHILS RELATIVE PERCENT: 66.5 %
NITRITE, URINE: NEGATIVE
PDW BLD-RTO: 13.7 % (ref 12.4–15.4)
PH UA: 6 (ref 5–8)
PLATELET # BLD: 175 K/UL (ref 135–450)
PMV BLD AUTO: 8.6 FL (ref 5–10.5)
POTASSIUM REFLEX MAGNESIUM: 4.7 MMOL/L (ref 3.5–5.1)
PROTEIN UA: NEGATIVE MG/DL
RBC # BLD: 4.05 M/UL (ref 4.2–5.9)
SODIUM BLD-SCNC: 137 MMOL/L (ref 136–145)
SPECIFIC GRAVITY UA: >=1.03 (ref 1–1.03)
T4 FREE: 1 NG/DL (ref 0.9–1.8)
TOTAL PROTEIN: 6.6 G/DL (ref 6.4–8.2)
TSH REFLEX: 4.89 UIU/ML (ref 0.27–4.2)
URINE REFLEX TO CULTURE: NORMAL
URINE TYPE: NORMAL
UROBILINOGEN, URINE: 0.2 E.U./DL
WBC # BLD: 4.8 K/UL (ref 4–11)

## 2021-11-07 PROCEDURE — 85025 COMPLETE CBC W/AUTO DIFF WBC: CPT

## 2021-11-07 PROCEDURE — 99285 EMERGENCY DEPT VISIT HI MDM: CPT

## 2021-11-07 PROCEDURE — 80076 HEPATIC FUNCTION PANEL: CPT

## 2021-11-07 PROCEDURE — 81003 URINALYSIS AUTO W/O SCOPE: CPT

## 2021-11-07 PROCEDURE — 80048 BASIC METABOLIC PNL TOTAL CA: CPT

## 2021-11-07 PROCEDURE — 84443 ASSAY THYROID STIM HORMONE: CPT

## 2021-11-07 PROCEDURE — 84439 ASSAY OF FREE THYROXINE: CPT

## 2021-11-07 PROCEDURE — 2580000003 HC RX 258: Performed by: EMERGENCY MEDICINE

## 2021-11-07 RX ORDER — 0.9 % SODIUM CHLORIDE 0.9 %
1000 INTRAVENOUS SOLUTION INTRAVENOUS ONCE
Status: COMPLETED | OUTPATIENT
Start: 2021-11-07 | End: 2021-11-07

## 2021-11-07 RX ORDER — NYSTATIN 100000 [USP'U]/G
POWDER TOPICAL
Qty: 30 G | Refills: 0 | Status: SHIPPED | OUTPATIENT
Start: 2021-11-07

## 2021-11-07 RX ADMIN — SODIUM CHLORIDE 1000 ML: 9 INJECTION, SOLUTION INTRAVENOUS at 10:04

## 2021-11-07 NOTE — ED TRIAGE NOTES
Patient arrives c/o a medication reaction to Eliquis. Patient states that a few hours after taking Eliquis his face becomes red, his face starts twitching, his lips start to burn, and he has burning with urination. Patient states that this reaction has happened before and he was taken off of the medication but he was just restarted on it again.

## 2021-11-07 NOTE — ED PROVIDER NOTES
Past Medical, Surgical, Family, and Social History     He has a past medical history of Non Hodgkin's lymphoma ([facility] [address] He has a past surgical history that includes lymph node biopsy and IR PORT PLACEMENT < 5 YEARS. His family history is not on file. He reports that he has never smoked. He has never used smokeless tobacco. He reports that he does not drink alcohol and does not use drugs. Medications     Previous Medications    CHOLECALCIFEROL (VITAMIN D3) 125 MCG (5000 UT) TABS    Take by mouth daily    FUROSEMIDE (LASIX) 20 MG TABLET    Take 20 mg by mouth daily    GLIMEPIRIDE (AMARYL) 2 MG TABLET    Take 2 mg by mouth every morning (before breakfast)    MULTIPLE VITAMINS-MINERALS (THERAPEUTIC MULTIVITAMIN-MINERALS) TABLET    Take 1 tablet by mouth daily    POTASSIUM CHLORIDE (MICRO-K) 10 MEQ EXTENDED RELEASE CAPSULE    Take 10 mEq by mouth daily    VITAMIN C (ASCORBIC ACID) 500 MG TABLET    Take 500 mg by mouth daily       Allergies     He is allergic to glimepiride, metformin, sitagliptin, acetaminophen, diphenhydramine, and statins. Physical Exam     INITIAL VITALS: BP: (!) 173/81, Temp: 97.8 °F (36.6 °C), Pulse: 76, Resp: 18, SpO2: 100 %       General:  Well appearing. No acute distress. Eyes:  Pupils reactive. No discharge from eyes. ENT:  No discharge from nose. OP clear. No facial twitching currently. Neck:  Supple. Pulmonary:   Non-labored breathing. Breath sounds clear bilaterally. Cardiac:  Regular rate and rhythm. No murmurs. Abdomen:  Soft. Non-tender. Non-distended. : Some minimal erythema around the glans of the penis with no involvement of the shaft or the scrotum. Musculoskeletal:  No CVA tenderness. Skin:  No rash. Neuro:  Alert and oriented x 4. CN II-XII intact. 5/5 strength in all 4 extremities. Sensation grossly intact to light touch. Speech and mentation normal.      Extremities:  Warm and perfused. No peripheral edema.     Diagnostic Results LABS:   Please see electronic medical record for laboratory tests performed in the ED. RECENT VITALS:  BP: (!) 156/96, Temp: 97.8 °F (36.6 °C), Pulse: 76, Resp: 18     Procedures         ED Course     Nursing Notes, Past Medical Hx, Past Surgical Hx, Social Hx, Allergies, and Family Hx were reviewed. The patient was given the following medications:  Orders Placed This Encounter   Medications    0.9 % sodium chloride bolus    nystatin (MYCOSTATIN) 227088 UNIT/GM powder     Sig: Apply 2 times daily. Dispense:  30 g     Refill:  0       CONSULTS:  None    MEDICAL DECISION MAKING / ASSESSMENT / Sai Hogue is a 79 y.o. male presenting with facial numbness and twitching as well as penile burning.  exam suspicious for mild Candida infection, will place the patient back on nystatin powder. I do not believe his symptoms are severe enough to warrant another course of oral Diflucan. Urinalysis negative for infection or blood. CBC with white count of 4.8 and hemoglobin of 13.5. Renal panel with normal electrolytes and renal function. BUN elevated at 23 which could represent some mild dehydration, therefore gave the patient a liter of fluid. LFTs normal.  TSH currently pending. I do not have a good reason for why the patient is having perioral numbness and facial twitching for the past weeks to months, I am giving him fluids in case this may be some element of dehydration although I have a low suspicion for that. I have advised the patient that should his symptoms continue he needs to follow-up with his primary care physician. Symptoms are not in a distribution that can be attributed to a central brain cause. Patient and wife are understanding of the instructions and agreeable to the plan. Return precautions given. Clinical Impression     1. Perioral numbness    2. Facial spasm    3.  Intertrigo of genitocrural region due to Candida species        Disposition     PATIENT REFERRED TO:  Sabine Perez MD  [address] [facility]  [phone]    Call today  for follow up if symptoms continue    [facility] Emergency Department  [address]  [address]  [phone]    If symptoms worsen      DISCHARGE MEDICATIONS:  New Prescriptions    NYSTATIN (MYCOSTATIN) 743189 UNIT/GM POWDER    Apply 2 times daily.        DISPOSITION Decision To Discharge 11/07/2021 10:36:57 Neena Scott MD  11/07/21 1031

## 2021-11-07 NOTE — ED NOTES
Patient prepared for and ready to be discharged. Patient discharged at this time in no acute distress after verbalizing understanding of discharge instructions. Patient left after receiving After Visit Summary instructions.       Douglas Orta RN  11/07/21 8795

## 2021-11-26 ENCOUNTER — HOSPITAL ENCOUNTER (EMERGENCY)
Age: 81
Discharge: HOME | End: 2021-11-26
Attending: EMERGENCY MEDICINE
Payer: MEDICARE

## 2021-11-26 VITALS
RESPIRATION RATE: 16 BRPM | DIASTOLIC BLOOD PRESSURE: 88 MMHG | TEMPERATURE: 98.7 F | OXYGEN SATURATION: 98 % | HEART RATE: 65 BPM | SYSTOLIC BLOOD PRESSURE: 136 MMHG

## 2021-11-26 DIAGNOSIS — R04.0 EPISTAXIS: Primary | ICD-10-CM

## 2021-11-26 LAB
ABO/RH: NORMAL
ANION GAP SERPL CALCULATED.3IONS-SCNC: 11 MMOL/L (ref 3–16)
ANTIBODY SCREEN: NORMAL
APTT: 39.4 SEC (ref 26.2–38.6)
BASOPHILS ABSOLUTE: 0 K/UL (ref 0–0.2)
BASOPHILS RELATIVE PERCENT: 0.8 %
BUN BLDV-MCNC: 23 MG/DL (ref 7–20)
CALCIUM SERPL-MCNC: 8.6 MG/DL (ref 8.3–10.6)
CHLORIDE BLD-SCNC: 103 MMOL/L (ref 99–110)
CO2: 26 MMOL/L (ref 21–32)
CREAT SERPL-MCNC: 1.2 MG/DL (ref 0.8–1.3)
EOSINOPHILS ABSOLUTE: 0.1 K/UL (ref 0–0.6)
EOSINOPHILS RELATIVE PERCENT: 1.2 %
GFR AFRICAN AMERICAN: >60
GFR NON-AFRICAN AMERICAN: 58
GLUCOSE BLD-MCNC: 113 MG/DL (ref 70–99)
HCT VFR BLD CALC: 40.5 % (ref 40.5–52.5)
HEMOGLOBIN: 13.6 G/DL (ref 13.5–17.5)
INR BLD: 1.36 (ref 0.88–1.12)
LYMPHOCYTES ABSOLUTE: 0.9 K/UL (ref 1–5.1)
LYMPHOCYTES RELATIVE PERCENT: 18.9 %
MCH RBC QN AUTO: 34 PG (ref 26–34)
MCHC RBC AUTO-ENTMCNC: 33.6 G/DL (ref 31–36)
MCV RBC AUTO: 101.3 FL (ref 80–100)
MONOCYTES ABSOLUTE: 0.6 K/UL (ref 0–1.3)
MONOCYTES RELATIVE PERCENT: 11.2 %
NEUTROPHILS ABSOLUTE: 3.4 K/UL (ref 1.7–7.7)
NEUTROPHILS RELATIVE PERCENT: 67.9 %
PDW BLD-RTO: 14.4 % (ref 12.4–15.4)
PLATELET # BLD: 162 K/UL (ref 135–450)
PMV BLD AUTO: 9.4 FL (ref 5–10.5)
POTASSIUM REFLEX MAGNESIUM: 4.2 MMOL/L (ref 3.5–5.1)
PROTHROMBIN TIME: 15.6 SEC (ref 9.9–12.7)
RBC # BLD: 4 M/UL (ref 4.2–5.9)
SODIUM BLD-SCNC: 140 MMOL/L (ref 136–145)
WBC # BLD: 5 K/UL (ref 4–11)

## 2021-11-26 PROCEDURE — 86900 BLOOD TYPING SEROLOGIC ABO: CPT

## 2021-11-26 PROCEDURE — 30901 CONTROL OF NOSEBLEED: CPT

## 2021-11-26 PROCEDURE — 36415 COLL VENOUS BLD VENIPUNCTURE: CPT

## 2021-11-26 PROCEDURE — 86901 BLOOD TYPING SEROLOGIC RH(D): CPT

## 2021-11-26 PROCEDURE — 99284 EMERGENCY DEPT VISIT MOD MDM: CPT

## 2021-11-26 PROCEDURE — 85610 PROTHROMBIN TIME: CPT

## 2021-11-26 PROCEDURE — 6370000000 HC RX 637 (ALT 250 FOR IP): Performed by: EMERGENCY MEDICINE

## 2021-11-26 PROCEDURE — 85730 THROMBOPLASTIN TIME PARTIAL: CPT

## 2021-11-26 PROCEDURE — 86850 RBC ANTIBODY SCREEN: CPT

## 2021-11-26 PROCEDURE — 80048 BASIC METABOLIC PNL TOTAL CA: CPT

## 2021-11-26 PROCEDURE — 85025 COMPLETE CBC W/AUTO DIFF WBC: CPT

## 2021-11-26 RX ORDER — OXYMETAZOLINE HYDROCHLORIDE 0.05 G/100ML
2 SPRAY NASAL ONCE
Status: COMPLETED | OUTPATIENT
Start: 2021-11-26 | End: 2021-11-26

## 2021-11-26 RX ADMIN — Medication 1 EACH: at 13:20

## 2021-11-26 RX ADMIN — OXYMETAZOLINE HCL 2 SPRAY: 0.05 SPRAY NASAL at 12:00

## 2021-11-26 NOTE — ED PROVIDER NOTES
ED Attending Attestation Note     Date of evaluation: 11/26/2021    This patient was seen by the resident. I have seen and examined the patient, agree with the workup, evaluation, management and diagnosis. The care plan has been discussed. My assessment reveals a well-appearing gentleman in no acute distress with a small minimally bleeding excoriation on his nasal turbinates in the right naris. I was present for epistaxis management.      Felicity Schumacher MD  11/26/21 9010

## 2021-11-26 NOTE — ED NOTES
Patient prepared for and ready to be discharged. Patient discharged at this time in no acute distress after verbalizing understanding of discharge instructions. Patient left after receiving After Visit Summary instructions.       Willy Cruz RN  11/26/21 8616

## 2021-11-26 NOTE — ED PROVIDER NOTES
Date ofevaluation: 11/26/2021    Chief Complaint   Epistaxis (right nare; on blood thinners; since 0800)      Nursing Notes, Past Medical Hx, Past Surgical Hx, Social Hx, Allergies, and Family Hx were reviewed. History of Present Illness     Etha Goltz is a 79 y.o. 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 Patient developed epistaxis from the right naris, bleeding was also dripping down his throat. This lasted approximately 1 hour. He denies any syncope or presyncope episodes. He has not had any trauma to his nose. Since coming to the hospital status post resolved. Review of Systems     Review of Systems   All other systems reviewed and are negative. Past Medical, Surgical, Family, and Social History         Diagnosis Date    Non Hodgkin's lymphoma ([facility] [address]     Type D         Procedure Laterality Date    IR PORT PLACEMENT < 5 YEARS      LYMPH NODE BIOPSY       His family history is not on file. He reports that he has never smoked. He has never used smokeless tobacco. He reports that he does not drink alcohol and does not use drugs. Medications     Previous Medications    CHOLECALCIFEROL (VITAMIN D3) 125 MCG (5000 UT) TABS    Take by mouth daily    FUROSEMIDE (LASIX) 20 MG TABLET    Take 20 mg by mouth daily    GLIMEPIRIDE (AMARYL) 2 MG TABLET    Take 2 mg by mouth every morning (before breakfast)    MULTIPLE VITAMINS-MINERALS (THERAPEUTIC MULTIVITAMIN-MINERALS) TABLET    Take 1 tablet by mouth daily    NYSTATIN (MYCOSTATIN) 174630 UNIT/GM POWDER    Apply 2 times daily. POTASSIUM CHLORIDE (MICRO-K) 10 MEQ EXTENDED RELEASE CAPSULE    Take 10 mEq by mouth daily    VITAMIN C (ASCORBIC ACID) 500 MG TABLET    Take 500 mg by mouth daily       Allergies     He is allergic to glimepiride, metformin, sitagliptin, acetaminophen, diphenhydramine, and statins.     Physical Exam     INITIAL VITALS: /88   Pulse 65 Temp 98.7 °F (37.1 °C) (Oral)   Resp 16   SpO2 98%    Physical Exam  Vitals reviewed. Constitutional:       General: He is not in acute distress. Appearance: He is normal weight. He is not toxic-appearing. HENT:      Head: Normocephalic and atraumatic. Nose: Nose normal. No congestion or rhinorrhea. Comments: No active bleeding, area of erythema in the anterior right naris     Mouth/Throat:      Mouth: Mucous membranes are moist.      Pharynx: Oropharynx is clear. Eyes:      Conjunctiva/sclera: Conjunctivae normal.      Pupils: Pupils are equal, round, and reactive to light. Cardiovascular:      Rate and Rhythm: Normal rate and regular rhythm. Heart sounds: No murmur heard. No friction rub. No gallop. Pulmonary:      Effort: Pulmonary effort is normal. No respiratory distress. Breath sounds: Normal breath sounds. No wheezing or rales. Abdominal:      General: Abdomen is flat. There is no distension. Palpations: Abdomen is soft. Tenderness: There is no abdominal tenderness. There is no guarding or rebound. Musculoskeletal:         General: Normal range of motion. Cervical back: Normal range of motion and neck supple. No muscular tenderness. Right lower leg: No edema. Left lower leg: No edema. Skin:     General: Skin is warm and dry. Neurological:      General: No focal deficit present. Mental Status: He is alert. Mental status is at baseline. Cranial Nerves: No cranial nerve deficit. Motor: No weakness. Diagnostic Results       RADIOLOGY:  No orders to display       LABS:   Labs Reviewed   CBC WITH AUTO DIFFERENTIAL - Abnormal; Notable for the following components:       Result Value    RBC 4.00 (*)     .3 (*)     Lymphocytes Absolute 0.9 (*)     All other components within normal limits    Narrative:     Performed at:   [facility] - [facility]  [place], [address] Phone [phone]   BASIC METABOLIC PANEL W/ REFLEX TO MG FOR LOW K - Abnormal; Notable for the following components:    Glucose 113 (*)     BUN 23 (*)     GFR Non- 58 (*)     All other components within normal limits    Narrative:     Performed at: [facility]  [place], [address]   Phone [phone]   APTT - Abnormal; Notable for the following components:    aPTT 39.4 (*)     All other components within normal limits    Narrative:     Performed at: [facility]  Latasha Thomas  [place], [address]   Phone [phone]   PROTIME-INR - Abnormal; Notable for the following components:    Protime 15.6 (*)     INR 1.36 (*)     All other components within normal limits    Narrative:     Performed at: [facility]  [place], [address]   Phone [phone]   TYPE AND SCREEN    Narrative:     Performed at: [facility]  [place], [address]   Phone [phone]       RECENTVITALS:  BP: 136/88, Temp: 98.7 °F (37.1 °C), Pulse: 65, Resp: 16     Procedures         ED Course     The patient was given the following medications:  Orders Placed This Encounter   Medications    silver nitrate applicators applicator 1 each    oxymetazoline (AFRIN) 0.05 % nasal spray 2 spray            CONSULTS:  None    MEDICAL DECISION MAKING     Lester Santoyo is a 79 y.o. male presenting with epistaxis. Patient had no active bleeding however there was an area of erythema in the anterior right naris. Silver nitrate was applied. Afrin was applied bilaterally. Patient's vital signs were normal no evidence of hemorrhagic shock. Laboratory studies obtained patient with normal platelets and hemoglobin. Patient instructed to skip a dose of his Xarelto tonight.   Patient also given instructions on what to do if bleeding continues    This patient was also evaluated by the attending physician. All care plans were discussed and agreedupon.     Clinical Impression     1. Epistaxis        Disposition/Plan     PATIENT REFERRED TO:  MD [address]  [phone]    Schedule an appointment as soon as possible for a visit in 2 days        DISCHARGE MEDICATIONS:  New Prescriptions    No medications on file       DISPOSITION Decision To Discharge 11/26/2021 12:56:02 PM        Deshawn Barkley MD  Resident  11/26/21 7808

## 2022-01-28 ENCOUNTER — HOSPITAL ENCOUNTER (OUTPATIENT)
Dept: CT IMAGING | Age: 82
Discharge: HOME | End: 2022-01-28
Payer: MEDICARE

## 2022-01-28 DIAGNOSIS — C83.33 RETICULOSARCOMA OF INTRA-ABDOMINAL LYMPH NODES (HCC): ICD-10-CM

## 2022-01-28 PROCEDURE — 70491 CT SOFT TISSUE NECK W/DYE: CPT

## 2022-01-28 PROCEDURE — 6360000004 HC RX CONTRAST MEDICATION: Performed by: INTERNAL MEDICINE

## 2022-01-28 PROCEDURE — 82565 ASSAY OF CREATININE: CPT

## 2022-01-28 PROCEDURE — 71260 CT THORAX DX C+: CPT

## 2022-01-28 RX ADMIN — IOHEXOL 50 ML: 240 INJECTION, SOLUTION INTRATHECAL; INTRAVASCULAR; INTRAVENOUS; ORAL at 08:47

## 2022-01-28 RX ADMIN — IOPAMIDOL 80 ML: 755 INJECTION, SOLUTION INTRAVENOUS at 08:47

## 2022-08-01 ENCOUNTER — HOSPITAL ENCOUNTER (OUTPATIENT)
Dept: CT IMAGING | Age: 82
Discharge: HOME | End: 2022-08-01
Payer: MEDICARE

## 2022-08-01 DIAGNOSIS — C83.33 RETICULOSARCOMA OF INTRA-ABDOMINAL LYMPH NODES (HCC): ICD-10-CM

## 2022-08-01 PROCEDURE — 6360000004 HC RX CONTRAST MEDICATION: Performed by: INTERNAL MEDICINE

## 2022-08-01 PROCEDURE — 74177 CT ABD & PELVIS W/CONTRAST: CPT

## 2022-08-01 PROCEDURE — 70491 CT SOFT TISSUE NECK W/DYE: CPT

## 2022-08-01 RX ADMIN — IOHEXOL 50 ML: 240 INJECTION, SOLUTION INTRATHECAL; INTRAVASCULAR; INTRAVENOUS; ORAL at 09:48

## 2022-08-01 RX ADMIN — IOPAMIDOL 100 ML: 755 INJECTION, SOLUTION INTRAVENOUS at 09:47

## 2023-11-12 ENCOUNTER — HOSPITAL ENCOUNTER (INPATIENT)
Age: 83
LOS: 8 days | Discharge: NURSING, REHAB OR LONG-TERM CARE | DRG: 177 | End: 2023-11-20
Attending: STUDENT IN AN ORGANIZED HEALTH CARE EDUCATION/TRAINING PROGRAM | Admitting: INTERNAL MEDICINE
Payer: MEDICARE

## 2023-11-12 ENCOUNTER — APPOINTMENT (OUTPATIENT)
Dept: CT IMAGING | Age: 83
DRG: 177 | End: 2023-11-12
Payer: MEDICARE

## 2023-11-12 ENCOUNTER — APPOINTMENT (OUTPATIENT)
Dept: GENERAL RADIOLOGY | Age: 83
DRG: 177 | End: 2023-11-12
Payer: MEDICARE

## 2023-11-12 DIAGNOSIS — J18.9 PNEUMONIA OF LEFT LUNG DUE TO INFECTIOUS ORGANISM, UNSPECIFIED PART OF LUNG: Primary | ICD-10-CM

## 2023-11-12 PROBLEM — J96.01 ACUTE HYPOXEMIC RESPIRATORY FAILURE (HCC): Status: ACTIVE | Noted: 2023-11-12

## 2023-11-12 LAB
ALBUMIN SERPL-MCNC: 3.6 G/DL (ref 3.4–5)
ALBUMIN/GLOB SERPL: 1.1 {RATIO} (ref 1.1–2.2)
ALP SERPL-CCNC: 69 U/L (ref 40–129)
ALT SERPL-CCNC: 13 U/L (ref 10–40)
ANION GAP SERPL CALCULATED.3IONS-SCNC: 16 MMOL/L (ref 3–16)
AST SERPL-CCNC: 33 U/L (ref 15–37)
BACTERIA URNS QL MICRO: ABNORMAL /HPF
BASOPHILS # BLD: 0 K/UL (ref 0–0.2)
BASOPHILS NFR BLD: 0 %
BILIRUB SERPL-MCNC: 0.4 MG/DL (ref 0–1)
BILIRUB UR QL STRIP.AUTO: NEGATIVE
BUN SERPL-MCNC: 23 MG/DL (ref 7–20)
BURR CELLS BLD QL SMEAR: ABNORMAL
CALCIUM SERPL-MCNC: 8.8 MG/DL (ref 8.3–10.6)
CHLORIDE SERPL-SCNC: 95 MMOL/L (ref 99–110)
CLARITY UR: CLEAR
CO2 SERPL-SCNC: 22 MMOL/L (ref 21–32)
COLOR UR: YELLOW
CREAT SERPL-MCNC: 1 MG/DL (ref 0.8–1.3)
CRP SERPL-MCNC: 114.1 MG/L (ref 0–5.1)
DEPRECATED RDW RBC AUTO: 13.7 % (ref 12.4–15.4)
EOSINOPHIL # BLD: 0 K/UL (ref 0–0.6)
EOSINOPHIL NFR BLD: 0 %
FERRITIN SERPL IA-MCNC: 1136 NG/ML (ref 30–400)
FLUAV RNA UPPER RESP QL NAA+PROBE: NEGATIVE
FLUBV AG NPH QL: NEGATIVE
GFR SERPLBLD CREATININE-BSD FMLA CKD-EPI: >60 ML/MIN/{1.73_M2}
GLUCOSE BLD-MCNC: 106 MG/DL (ref 70–99)
GLUCOSE SERPL-MCNC: 121 MG/DL (ref 70–99)
GLUCOSE UR STRIP.AUTO-MCNC: NEGATIVE MG/DL
HCT VFR BLD AUTO: 42.8 % (ref 40.5–52.5)
HGB BLD-MCNC: 14.3 G/DL (ref 13.5–17.5)
HGB UR QL STRIP.AUTO: ABNORMAL
HYPOCHROMIA BLD QL SMEAR: ABNORMAL
INR PPP: 1.27 (ref 0.84–1.16)
KETONES UR STRIP.AUTO-MCNC: 15 MG/DL
LEUKOCYTE ESTERASE UR QL STRIP.AUTO: NEGATIVE
LYMPHOCYTES # BLD: 1 K/UL (ref 1–5.1)
LYMPHOCYTES NFR BLD: 19 %
MCH RBC QN AUTO: 32.3 PG (ref 26–34)
MCHC RBC AUTO-ENTMCNC: 33.4 G/DL (ref 31–36)
MCV RBC AUTO: 96.8 FL (ref 80–100)
MONOCYTES # BLD: 0.5 K/UL (ref 0–1.3)
MONOCYTES NFR BLD: 10 %
MUCOUS THREADS #/AREA URNS LPF: ABNORMAL /LPF
NEUTROPHILS # BLD: 3.8 K/UL (ref 1.7–7.7)
NEUTROPHILS NFR BLD: 70 %
NEUTS BAND NFR BLD MANUAL: 1 % (ref 0–7)
NITRITE UR QL STRIP.AUTO: NEGATIVE
NT-PROBNP SERPL-MCNC: 185 PG/ML (ref 0–449)
OVALOCYTES BLD QL SMEAR: ABNORMAL
PERFORMED ON: ABNORMAL
PH UR STRIP.AUTO: 6 [PH] (ref 5–8)
PLATELET # BLD AUTO: 208 K/UL (ref 135–450)
PMV BLD AUTO: 8.5 FL (ref 5–10.5)
POTASSIUM SERPL-SCNC: 4 MMOL/L (ref 3.5–5.1)
PROCALCITONIN SERPL IA-MCNC: 0.08 NG/ML (ref 0–0.15)
PROT SERPL-MCNC: 7 G/DL (ref 6.4–8.2)
PROT UR STRIP.AUTO-MCNC: 30 MG/DL
PROTHROMBIN TIME: 15.9 SEC (ref 11.5–14.8)
RBC # BLD AUTO: 4.42 M/UL (ref 4.2–5.9)
RBC #/AREA URNS HPF: ABNORMAL /HPF (ref 0–4)
SARS-COV-2 RDRP RESP QL NAA+PROBE: DETECTED
SODIUM SERPL-SCNC: 133 MMOL/L (ref 136–145)
SP GR UR STRIP.AUTO: 1.02 (ref 1–1.03)
TROPONIN, HIGH SENSITIVITY: 26 NG/L (ref 0–22)
UA COMPLETE W REFLEX CULTURE PNL UR: ABNORMAL
UA DIPSTICK W REFLEX MICRO PNL UR: YES
URN SPEC COLLECT METH UR: ABNORMAL
UROBILINOGEN UR STRIP-ACNC: 0.2 E.U./DL
WBC # BLD AUTO: 5.4 K/UL (ref 4–11)
WBC #/AREA URNS HPF: ABNORMAL /HPF (ref 0–5)

## 2023-11-12 PROCEDURE — 85610 PROTHROMBIN TIME: CPT

## 2023-11-12 PROCEDURE — 70498 CT ANGIOGRAPHY NECK: CPT

## 2023-11-12 PROCEDURE — 96367 TX/PROPH/DG ADDL SEQ IV INF: CPT

## 2023-11-12 PROCEDURE — 80053 COMPREHEN METABOLIC PANEL: CPT

## 2023-11-12 PROCEDURE — 84145 PROCALCITONIN (PCT): CPT

## 2023-11-12 PROCEDURE — 71046 X-RAY EXAM CHEST 2 VIEWS: CPT

## 2023-11-12 PROCEDURE — 99285 EMERGENCY DEPT VISIT HI MDM: CPT

## 2023-11-12 PROCEDURE — 87804 INFLUENZA ASSAY W/OPTIC: CPT

## 2023-11-12 PROCEDURE — 6360000004 HC RX CONTRAST MEDICATION: Performed by: STUDENT IN AN ORGANIZED HEALTH CARE EDUCATION/TRAINING PROGRAM

## 2023-11-12 PROCEDURE — 6360000002 HC RX W HCPCS: Performed by: INTERNAL MEDICINE

## 2023-11-12 PROCEDURE — 82728 ASSAY OF FERRITIN: CPT

## 2023-11-12 PROCEDURE — 85025 COMPLETE CBC W/AUTO DIFF WBC: CPT

## 2023-11-12 PROCEDURE — 36415 COLL VENOUS BLD VENIPUNCTURE: CPT

## 2023-11-12 PROCEDURE — 2060000000 HC ICU INTERMEDIATE R&B

## 2023-11-12 PROCEDURE — 6370000000 HC RX 637 (ALT 250 FOR IP): Performed by: INTERNAL MEDICINE

## 2023-11-12 PROCEDURE — 86140 C-REACTIVE PROTEIN: CPT

## 2023-11-12 PROCEDURE — 87635 SARS-COV-2 COVID-19 AMP PRB: CPT

## 2023-11-12 PROCEDURE — 6360000002 HC RX W HCPCS: Performed by: STUDENT IN AN ORGANIZED HEALTH CARE EDUCATION/TRAINING PROGRAM

## 2023-11-12 PROCEDURE — 2580000003 HC RX 258: Performed by: STUDENT IN AN ORGANIZED HEALTH CARE EDUCATION/TRAINING PROGRAM

## 2023-11-12 PROCEDURE — 87040 BLOOD CULTURE FOR BACTERIA: CPT

## 2023-11-12 PROCEDURE — 84484 ASSAY OF TROPONIN QUANT: CPT

## 2023-11-12 PROCEDURE — 83880 ASSAY OF NATRIURETIC PEPTIDE: CPT

## 2023-11-12 PROCEDURE — 96365 THER/PROPH/DIAG IV INF INIT: CPT

## 2023-11-12 PROCEDURE — 81001 URINALYSIS AUTO W/SCOPE: CPT

## 2023-11-12 PROCEDURE — 3E0333Z INTRODUCTION OF ANTI-INFLAMMATORY INTO PERIPHERAL VEIN, PERCUTANEOUS APPROACH: ICD-10-PCS | Performed by: STUDENT IN AN ORGANIZED HEALTH CARE EDUCATION/TRAINING PROGRAM

## 2023-11-12 PROCEDURE — 70450 CT HEAD/BRAIN W/O DYE: CPT

## 2023-11-12 PROCEDURE — 93005 ELECTROCARDIOGRAM TRACING: CPT | Performed by: STUDENT IN AN ORGANIZED HEALTH CARE EDUCATION/TRAINING PROGRAM

## 2023-11-12 PROCEDURE — 2580000003 HC RX 258: Performed by: INTERNAL MEDICINE

## 2023-11-12 PROCEDURE — 87081 CULTURE SCREEN ONLY: CPT

## 2023-11-12 RX ORDER — DEXAMETHASONE SODIUM PHOSPHATE 10 MG/ML
6 INJECTION, SOLUTION INTRAMUSCULAR; INTRAVENOUS EVERY 24 HOURS
Status: DISCONTINUED | OUTPATIENT
Start: 2023-11-12 | End: 2023-11-12 | Stop reason: SDUPTHER

## 2023-11-12 RX ORDER — DEXAMETHASONE SODIUM PHOSPHATE 4 MG/ML
6 INJECTION, SOLUTION INTRA-ARTICULAR; INTRALESIONAL; INTRAMUSCULAR; INTRAVENOUS; SOFT TISSUE EVERY 24 HOURS
Status: DISCONTINUED | OUTPATIENT
Start: 2023-11-12 | End: 2023-11-20 | Stop reason: HOSPADM

## 2023-11-12 RX ORDER — SODIUM CHLORIDE 0.9 % (FLUSH) 0.9 %
5-40 SYRINGE (ML) INJECTION PRN
Status: DISCONTINUED | OUTPATIENT
Start: 2023-11-12 | End: 2023-11-20 | Stop reason: HOSPADM

## 2023-11-12 RX ORDER — AZITHROMYCIN 250 MG/1
500 TABLET, FILM COATED ORAL EVERY 24 HOURS
Status: COMPLETED | OUTPATIENT
Start: 2023-11-13 | End: 2023-11-15

## 2023-11-12 RX ORDER — SODIUM CHLORIDE 9 MG/ML
INJECTION, SOLUTION INTRAVENOUS PRN
Status: DISCONTINUED | OUTPATIENT
Start: 2023-11-12 | End: 2023-11-20 | Stop reason: HOSPADM

## 2023-11-12 RX ORDER — SODIUM CHLORIDE 0.9 % (FLUSH) 0.9 %
5-40 SYRINGE (ML) INJECTION EVERY 12 HOURS SCHEDULED
Status: DISCONTINUED | OUTPATIENT
Start: 2023-11-12 | End: 2023-11-20 | Stop reason: HOSPADM

## 2023-11-12 RX ORDER — ONDANSETRON 2 MG/ML
4 INJECTION INTRAMUSCULAR; INTRAVENOUS EVERY 6 HOURS PRN
Status: DISCONTINUED | OUTPATIENT
Start: 2023-11-12 | End: 2023-11-20 | Stop reason: HOSPADM

## 2023-11-12 RX ORDER — GUAIFENESIN 600 MG/1
600 TABLET, EXTENDED RELEASE ORAL 2 TIMES DAILY
Status: DISCONTINUED | OUTPATIENT
Start: 2023-11-12 | End: 2023-11-20 | Stop reason: HOSPADM

## 2023-11-12 RX ORDER — ONDANSETRON 4 MG/1
4 TABLET, ORALLY DISINTEGRATING ORAL EVERY 8 HOURS PRN
Status: DISCONTINUED | OUTPATIENT
Start: 2023-11-12 | End: 2023-11-20 | Stop reason: HOSPADM

## 2023-11-12 RX ORDER — ACETAMINOPHEN 650 MG/1
650 SUPPOSITORY RECTAL EVERY 6 HOURS PRN
Status: DISCONTINUED | OUTPATIENT
Start: 2023-11-12 | End: 2023-11-20 | Stop reason: HOSPADM

## 2023-11-12 RX ORDER — ACETAMINOPHEN 325 MG/1
650 TABLET ORAL EVERY 6 HOURS PRN
Status: DISCONTINUED | OUTPATIENT
Start: 2023-11-12 | End: 2023-11-20 | Stop reason: HOSPADM

## 2023-11-12 RX ORDER — BENZONATATE 100 MG/1
100 CAPSULE ORAL 3 TIMES DAILY PRN
Status: DISCONTINUED | OUTPATIENT
Start: 2023-11-12 | End: 2023-11-20 | Stop reason: HOSPADM

## 2023-11-12 RX ORDER — POLYETHYLENE GLYCOL 3350 17 G/17G
17 POWDER, FOR SOLUTION ORAL DAILY PRN
Status: DISCONTINUED | OUTPATIENT
Start: 2023-11-12 | End: 2023-11-20 | Stop reason: HOSPADM

## 2023-11-12 RX ADMIN — GUAIFENESIN 600 MG: 600 TABLET ORAL at 23:23

## 2023-11-12 RX ADMIN — IOPAMIDOL 75 ML: 755 INJECTION, SOLUTION INTRAVENOUS at 16:44

## 2023-11-12 RX ADMIN — CEFTRIAXONE 1000 MG: 1 INJECTION, POWDER, FOR SOLUTION INTRAMUSCULAR; INTRAVENOUS at 17:55

## 2023-11-12 RX ADMIN — SODIUM CHLORIDE, PRESERVATIVE FREE 10 ML: 5 INJECTION INTRAVENOUS at 23:29

## 2023-11-12 RX ADMIN — AZITHROMYCIN MONOHYDRATE 500 MG: 500 INJECTION, POWDER, LYOPHILIZED, FOR SOLUTION INTRAVENOUS at 19:30

## 2023-11-12 RX ADMIN — DEXAMETHASONE SODIUM PHOSPHATE 6 MG: 4 INJECTION INTRA-ARTICULAR; INTRALESIONAL; INTRAMUSCULAR; INTRAVENOUS; SOFT TISSUE at 23:29

## 2023-11-12 ASSESSMENT — PAIN - FUNCTIONAL ASSESSMENT: PAIN_FUNCTIONAL_ASSESSMENT: NONE - DENIES PAIN

## 2023-11-12 NOTE — ED NOTES
Pt ambulatory to bathroom with standby assist. Pt had steady gait.       Colletta Chamber, RN  11/12/23 7874

## 2023-11-12 NOTE — ED NOTES
Pt arrives from home with wife and daughter. Family states pt has been confused since approx 1400 today. Pt is able to tell this this RN his name nut is disoriented to time and place. Pt alert yet disoriented. Pt cooperative. Pt denies any complaints. See flowsheets for vital signs.       Jewels Tinoco RN  11/12/23 4895

## 2023-11-13 ENCOUNTER — APPOINTMENT (OUTPATIENT)
Dept: MRI IMAGING | Age: 83
DRG: 177 | End: 2023-11-13
Payer: MEDICARE

## 2023-11-13 PROBLEM — R47.01 APHASIA: Status: ACTIVE | Noted: 2023-11-13

## 2023-11-13 PROBLEM — R41.0 DELIRIUM: Status: ACTIVE | Noted: 2023-11-13

## 2023-11-13 LAB
25(OH)D3 SERPL-MCNC: 61.2 NG/ML
ALBUMIN SERPL-MCNC: 3.2 G/DL (ref 3.4–5)
ALP SERPL-CCNC: 63 U/L (ref 40–129)
ALT SERPL-CCNC: 10 U/L (ref 10–40)
ANION GAP SERPL CALCULATED.3IONS-SCNC: 11 MMOL/L (ref 3–16)
AST SERPL-CCNC: 32 U/L (ref 15–37)
BASOPHILS # BLD: 0 K/UL (ref 0–0.2)
BASOPHILS NFR BLD: 0.1 %
BILIRUB DIRECT SERPL-MCNC: <0.2 MG/DL (ref 0–0.3)
BILIRUB INDIRECT SERPL-MCNC: ABNORMAL MG/DL (ref 0–1)
BILIRUB SERPL-MCNC: 0.3 MG/DL (ref 0–1)
BUN SERPL-MCNC: 21 MG/DL (ref 7–20)
CALCIUM SERPL-MCNC: 8.4 MG/DL (ref 8.3–10.6)
CHLORIDE SERPL-SCNC: 98 MMOL/L (ref 99–110)
CHOLEST SERPL-MCNC: 177 MG/DL (ref 0–199)
CO2 SERPL-SCNC: 23 MMOL/L (ref 21–32)
CREAT SERPL-MCNC: 0.9 MG/DL (ref 0.8–1.3)
CRP SERPL-MCNC: 94.2 MG/L (ref 0–5.1)
DEPRECATED RDW RBC AUTO: 13.4 % (ref 12.4–15.4)
EKG ATRIAL RATE: 63 BPM
EKG DIAGNOSIS: NORMAL
EKG Q-T INTERVAL: 400 MS
EKG QRS DURATION: 108 MS
EKG QTC CALCULATION (BAZETT): 476 MS
EKG R AXIS: -54 DEGREES
EKG T AXIS: 52 DEGREES
EKG VENTRICULAR RATE: 85 BPM
EOSINOPHIL # BLD: 0 K/UL (ref 0–0.6)
EOSINOPHIL NFR BLD: 0.1 %
EST. AVERAGE GLUCOSE BLD GHB EST-MCNC: 174.3 MG/DL
FERRITIN SERPL IA-MCNC: 1009 NG/ML (ref 30–400)
GFR SERPLBLD CREATININE-BSD FMLA CKD-EPI: >60 ML/MIN/{1.73_M2}
GLUCOSE SERPL-MCNC: 192 MG/DL (ref 70–99)
HBA1C MFR BLD: 7.7 %
HCT VFR BLD AUTO: 38.3 % (ref 40.5–52.5)
HDLC SERPL-MCNC: 29 MG/DL (ref 40–60)
HGB BLD-MCNC: 13.1 G/DL (ref 13.5–17.5)
LDLC SERPL CALC-MCNC: 125 MG/DL
LYMPHOCYTES # BLD: 0.4 K/UL (ref 1–5.1)
LYMPHOCYTES NFR BLD: 10.7 %
MCH RBC QN AUTO: 32.9 PG (ref 26–34)
MCHC RBC AUTO-ENTMCNC: 34.3 G/DL (ref 31–36)
MCV RBC AUTO: 96 FL (ref 80–100)
MONOCYTES # BLD: 0.2 K/UL (ref 0–1.3)
MONOCYTES NFR BLD: 5.8 %
NEUTROPHILS # BLD: 3.4 K/UL (ref 1.7–7.7)
NEUTROPHILS NFR BLD: 83.3 %
PHOSPHATE SERPL-MCNC: 2.6 MG/DL (ref 2.5–4.9)
PLATELET # BLD AUTO: 180 K/UL (ref 135–450)
PMV BLD AUTO: 8.2 FL (ref 5–10.5)
POTASSIUM SERPL-SCNC: 3.9 MMOL/L (ref 3.5–5.1)
PROT SERPL-MCNC: 6.1 G/DL (ref 6.4–8.2)
RBC # BLD AUTO: 3.99 M/UL (ref 4.2–5.9)
SODIUM SERPL-SCNC: 132 MMOL/L (ref 136–145)
TRIGL SERPL-MCNC: 117 MG/DL (ref 0–150)
TROPONIN, HIGH SENSITIVITY: 29 NG/L (ref 0–22)
VLDLC SERPL CALC-MCNC: 23 MG/DL
WBC # BLD AUTO: 4.1 K/UL (ref 4–11)

## 2023-11-13 PROCEDURE — 80048 BASIC METABOLIC PNL TOTAL CA: CPT

## 2023-11-13 PROCEDURE — 82728 ASSAY OF FERRITIN: CPT

## 2023-11-13 PROCEDURE — 70551 MRI BRAIN STEM W/O DYE: CPT

## 2023-11-13 PROCEDURE — 80076 HEPATIC FUNCTION PANEL: CPT

## 2023-11-13 PROCEDURE — 84484 ASSAY OF TROPONIN QUANT: CPT

## 2023-11-13 PROCEDURE — 85025 COMPLETE CBC W/AUTO DIFF WBC: CPT

## 2023-11-13 PROCEDURE — 97530 THERAPEUTIC ACTIVITIES: CPT

## 2023-11-13 PROCEDURE — 82306 VITAMIN D 25 HYDROXY: CPT

## 2023-11-13 PROCEDURE — 97535 SELF CARE MNGMENT TRAINING: CPT

## 2023-11-13 PROCEDURE — 2060000000 HC ICU INTERMEDIATE R&B

## 2023-11-13 PROCEDURE — 80061 LIPID PANEL: CPT

## 2023-11-13 PROCEDURE — 97166 OT EVAL MOD COMPLEX 45 MIN: CPT

## 2023-11-13 PROCEDURE — 97162 PT EVAL MOD COMPLEX 30 MIN: CPT

## 2023-11-13 PROCEDURE — 6360000002 HC RX W HCPCS: Performed by: INTERNAL MEDICINE

## 2023-11-13 PROCEDURE — 36415 COLL VENOUS BLD VENIPUNCTURE: CPT

## 2023-11-13 PROCEDURE — 83036 HEMOGLOBIN GLYCOSYLATED A1C: CPT

## 2023-11-13 PROCEDURE — 6370000000 HC RX 637 (ALT 250 FOR IP): Performed by: INTERNAL MEDICINE

## 2023-11-13 PROCEDURE — 84100 ASSAY OF PHOSPHORUS: CPT

## 2023-11-13 PROCEDURE — 86140 C-REACTIVE PROTEIN: CPT

## 2023-11-13 PROCEDURE — 97116 GAIT TRAINING THERAPY: CPT

## 2023-11-13 PROCEDURE — 2580000003 HC RX 258: Performed by: INTERNAL MEDICINE

## 2023-11-13 PROCEDURE — 99223 1ST HOSP IP/OBS HIGH 75: CPT | Performed by: PSYCHIATRY & NEUROLOGY

## 2023-11-13 RX ADMIN — SODIUM CHLORIDE, PRESERVATIVE FREE 10 ML: 5 INJECTION INTRAVENOUS at 22:31

## 2023-11-13 RX ADMIN — SODIUM CHLORIDE, PRESERVATIVE FREE 10 ML: 5 INJECTION INTRAVENOUS at 08:10

## 2023-11-13 RX ADMIN — AZITHROMYCIN DIHYDRATE 500 MG: 250 TABLET ORAL at 19:05

## 2023-11-13 RX ADMIN — RIVAROXABAN 20 MG: 20 TABLET, FILM COATED ORAL at 18:04

## 2023-11-13 RX ADMIN — GUAIFENESIN 600 MG: 600 TABLET ORAL at 08:08

## 2023-11-13 RX ADMIN — DEXAMETHASONE SODIUM PHOSPHATE 6 MG: 4 INJECTION INTRA-ARTICULAR; INTRALESIONAL; INTRAMUSCULAR; INTRAVENOUS; SOFT TISSUE at 23:51

## 2023-11-13 RX ADMIN — GUAIFENESIN 600 MG: 600 TABLET ORAL at 22:31

## 2023-11-13 NOTE — ACP (ADVANCE CARE PLANNING)
Advance Care Planning     Advance Care Planning Inpatient Note  Spiritual Care Department    Today's Date: 11/13/2023  Unit: [address]    Received request from [employer]. Upon review of chart and communication with care team, request Health Care Provider's clarification of patient's decision making capacity. . Patient and Spouse was/were present in the room during visit. Goals of ACP Conversation:  Discuss advance care planning documents    Health Care Decision Makers:       Primary Decision Maker: Saranya Loyd - [phone]  Summary:  Documented Next of Kin, per patient report    Advance Care Planning Documents (Patient Wishes):  None     Assessment:  Spoke with PT and spouse. Spouse stated that she would provide the PT\"s HCPOA tomorrow. 11/14.     Interventions:  Requested patient/family to submit existing document for our records: None    Care Preferences Communicated:   No    Outcomes/Plan:  ACP Discussion: Completed    Electronically signed by Philomena Real, [address] [place] [address] on 11/13/2023 at 3:08 PM

## 2023-11-13 NOTE — ED NOTES
ED TO INPATIENT SBAR HANDOFF    Patient Name: Jose A Nova   :  1940  79 y.o. MRN:  [id number]  Preferred Name    ED Room #:  B23/B23-23  Family/Caregiver Present yes   Restraints no   Sitter no   Sepsis Risk Score Sepsis Risk Score: 0.85    Situation  Code Status: Full Code No additional code details. Allergies: Glimepiride, Metformin, Sitagliptin, Acetaminophen, Diphenhydramine, and Statins  Weight: No data found. Arrived from: home  Chief Complaint:   Chief Complaint   Patient presents with    Altered Mental Status     Around 2 pm patient couldn't remember who his wife was and couldn't remember his birthday. Pt is disoriented in triage. Hospital Problem/Diagnosis:  Principal Problem:    Acute hypoxemic respiratory failure ([address])  Resolved Problems:    * No resolved hospital problems. *    Imaging:   XR CHEST (2 VW)   Final Result      Mild ill-defined left mid and basilar airspace opacity suspicious for pneumonia. Electronically signed by Sharlene Hess MD      [address]   Final Result      1. No steno-occlusive disease or large vessel occlusion. 2. Patchy bilateral upper lobe airspace disease suspicious for multifocal pneumonia. Electronically signed by Sharlene Hess MD      CT HEAD WO CONTRAST   Final Result      No evidence of intracranial hemorrhage, mass, or CT evidence of acute stroke. The findings were communicated to the emergency department by report at [phone] [phone].       Electronically signed by Sharlene Hess MD      MRI BRAIN WO CONTRAST    (Results Pending)     Abnormal labs:   Abnormal Labs Reviewed   COVID-19, RAPID - Abnormal; Notable for the following components:       Result Value    SARS-CoV-2, NAAT DETECTED (*)     All other components within normal limits   CBC WITH AUTO DIFFERENTIAL - Abnormal; Notable for the following components:    Hypochromia Occasional (*)     Julio C Cells Occasional (*)     Ovalocytes 1+ (*)     All other components

## 2023-11-13 NOTE — CARE COORDINATION
Case Management Assessment  Initial Evaluation    Date/Time of Evaluation: 11/13/2023 3:38 PM  Assessment Completed by: Emely Dobbins RN    If patient is discharged prior to next notation, then this note serves as note for discharge by case management. Patient Name: Olaf Isabel                   YOB: 1940  Diagnosis: Pneumonia of left lung due to infectious organism, unspecified part of lung [J18.9]  Acute hypoxemic respiratory failure ([address]) [J96.01]                   Date / Time: 11/12/2023  4:15 PM    Patient Admission Status: Inpatient   Readmission Risk (Low < 19, Mod (19-27), High > 27): Readmission Risk Score: 11.1    Current PCP: Susu Le MD  PCP verified by CM? Yes    Chart Reviewed: Yes      History Provided by: Child/Family  Patient Orientation: Other (see comment) (talked to daughter on telephone)    Patient Cognition: Alert    Hospitalization in the last 30 days (Readmission):  No    If yes, Readmission Assessment in  Navigator will be completed. Advance Directives:      Code Status: Full Code   Patient's Primary Decision Maker is: Legal Next of Kin    Primary Decision MakerSheilda Current Spouse - [phone]    Discharge Planning:    Patient lives with: Spouse/Significant Other Type of Home: House  Primary Care Giver: Spouse  Patient Support Systems include: Spouse/Significant Other, Children   Current Financial resources: Medicare  Current community resources: None  Current services prior to admission: None            Current DME:              Type of Home Care services:  OT, PT, Nursing Services    ADLS  Prior functional level: Independent in ADLs/IADLs  Current functional level:      PT AM-PAC: 18 /24  OT AM-PAC: 17 /24    Family can provide assistance at DC: Yes  Would you like Case Management to discuss the discharge plan with any other family members/significant others, and if so, who?  Yes (wife, daughter)  Plans to Return to Present Housing: Unknown at

## 2023-11-13 NOTE — H&P
Hospital Medicine History & Physical      PCP: Vick Hanley MD    Date of Admission: 11/12/2023    Chief Complaint:  Altered Mental Status (Around 2 pm patient couldn't remember who his wife was and couldn't remember his birthday. Pt is disoriented in triage. )      History Of Present Illness:       79 y.o. 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 His family member state he went to take a nap around 10 AM today acting normally and then woke up around 2 PM and did not recognize his family and was having word finding difficulty. He has never had anything like this in the past.  He has not missed any of his doses of Xarelto. He otherwise has been behaving normally. The patient does not have any complaints at this time. Past Medical History:          Diagnosis Date    Non Hodgkin's lymphoma ([address])     Type D       Past Surgical History:          Procedure Laterality Date    IR PORT PLACEMENT < 5 YEARS      LYMPH NODE BIOPSY         Medications Prior to Admission:      Prior to Admission medications    Medication Sig Start Date End Date Taking? Authorizing Provider   nystatin (MYCOSTATIN) 870599 UNIT/GM powder Apply 2 times daily.  11/7/21   Loyd Padilla MD   furosemide (LASIX) 20 MG tablet Take 20 mg by mouth daily    Joe Alvarez MD   potassium chloride (MICRO-K) 10 MEQ extended release capsule Take 10 mEq by mouth daily    Joe Alvarez MD   glimepiride (AMARYL) 2 MG tablet Take 2 mg by mouth every morning (before breakfast)    Joe Alvarez MD   Cholecalciferol (VITAMIN D3) 125 MCG (5000 UT) TABS Take by mouth daily    Joe Alvarez MD   Multiple Vitamins-Minerals (THERAPEUTIC MULTIVITAMIN-MINERALS) tablet Take 1 tablet by mouth daily    Joe Alvarez MD   vitamin C (ASCORBIC ACID) 500 MG tablet Take 500 mg by mouth daily

## 2023-11-13 NOTE — CONSULTS
Neurology Consultation Note      Patient: Lulu Grijalva MRN: [id number]    YOB: 1940  Age: 79 y.o.   Sex: male   Unit: [facility] [address]T [address] [place] [address] Room/Bed: 8121/3240-86 Location: [address] [place] [address]    Date of Consultation: 11/13/2023  Date of Admission: 11/12/2023  4:15 PM ( LOS: 1 day )  Admitting Physician: Britany Hernandez    Primary Care Physician: Valentina Shabazz MD   Consult Requested By: Britany Hernandez MD     Reason for Consult: Uyen Slipper, Dysarrthria\"    ASSESSMENT & RECOMMENDATIONS     Assessment  80yo man with B-cell lymphoma and chronic PE + IVC on rivaroxaban at home, presented with abrupt change in language, described as dysarthria but is more consistent with expressive aphasia, and found to have multifocal bilateral pneumonia in the context of recent COVID infection (with currently positive COVID screen now)  Given the presence of infection, would be a bit more inclined to think that his presentation is consistent with delirium  However, unusual that in the context of likely building infection (COVID positive two weeks ago and pneumonia has been brewing since then) that he would suddenly wake with these symptoms and have it be secondary to delirium  Also a bit unusual that pneumonia, without any other significant difficulties or more obvious signs of sepsis, would lead to delirium (although this is certainly possible)  Therefore, need to exclude that his aphasia is due to stroke (Wernicke's type)    Recommendations  Agree with MRI brain  Continue rivaroxaban (no need for addition of antiplatelet agent at this time)  Treat underlying infection, as you are  If MRI unremarkable for stroke then likely this is delirium and will monitor for improvement as pneumonia is treated  Further recs to follow if MRI positive for stroke      SUBJECTIVE     Chief Complaint:   \"I'm feeling good\" Consult is for abrupt onset language dysfunction in the context of COVID & superimposed bacterial pneumonia    History

## 2023-11-14 LAB
ANION GAP SERPL CALCULATED.3IONS-SCNC: 13 MMOL/L (ref 3–16)
BASOPHILS # BLD: 0 K/UL (ref 0–0.2)
BASOPHILS NFR BLD: 0 %
BUN SERPL-MCNC: 27 MG/DL (ref 7–20)
CALCIUM SERPL-MCNC: 8.5 MG/DL (ref 8.3–10.6)
CHLORIDE SERPL-SCNC: 99 MMOL/L (ref 99–110)
CO2 SERPL-SCNC: 24 MMOL/L (ref 21–32)
CREAT SERPL-MCNC: 1 MG/DL (ref 0.8–1.3)
DEPRECATED RDW RBC AUTO: 13.4 % (ref 12.4–15.4)
EOSINOPHIL # BLD: 0 K/UL (ref 0–0.6)
EOSINOPHIL NFR BLD: 0 %
GFR SERPLBLD CREATININE-BSD FMLA CKD-EPI: >60 ML/MIN/{1.73_M2}
GLUCOSE SERPL-MCNC: 231 MG/DL (ref 70–99)
HCT VFR BLD AUTO: 37.7 % (ref 40.5–52.5)
HGB BLD-MCNC: 13 G/DL (ref 13.5–17.5)
LYMPHOCYTES # BLD: 0.4 K/UL (ref 1–5.1)
LYMPHOCYTES NFR BLD: 6 %
MCH RBC QN AUTO: 32.5 PG (ref 26–34)
MCHC RBC AUTO-ENTMCNC: 34.4 G/DL (ref 31–36)
MCV RBC AUTO: 94.3 FL (ref 80–100)
MONOCYTES # BLD: 0.2 K/UL (ref 0–1.3)
MONOCYTES NFR BLD: 4 %
NEUTROPHILS # BLD: 5.5 K/UL (ref 1.7–7.7)
NEUTROPHILS NFR BLD: 90 %
OVALOCYTES BLD QL SMEAR: ABNORMAL
PLATELET # BLD AUTO: 203 K/UL (ref 135–450)
PMV BLD AUTO: 8.3 FL (ref 5–10.5)
POTASSIUM SERPL-SCNC: 4.2 MMOL/L (ref 3.5–5.1)
RBC # BLD AUTO: 4 M/UL (ref 4.2–5.9)
SODIUM SERPL-SCNC: 136 MMOL/L (ref 136–145)
WBC # BLD AUTO: 6.1 K/UL (ref 4–11)

## 2023-11-14 PROCEDURE — 36415 COLL VENOUS BLD VENIPUNCTURE: CPT

## 2023-11-14 PROCEDURE — 6370000000 HC RX 637 (ALT 250 FOR IP): Performed by: INTERNAL MEDICINE

## 2023-11-14 PROCEDURE — 6360000002 HC RX W HCPCS: Performed by: NURSE PRACTITIONER

## 2023-11-14 PROCEDURE — 6360000002 HC RX W HCPCS: Performed by: INTERNAL MEDICINE

## 2023-11-14 PROCEDURE — 2580000003 HC RX 258: Performed by: INTERNAL MEDICINE

## 2023-11-14 PROCEDURE — 97530 THERAPEUTIC ACTIVITIES: CPT

## 2023-11-14 PROCEDURE — 97535 SELF CARE MNGMENT TRAINING: CPT

## 2023-11-14 PROCEDURE — 2700000000 HC OXYGEN THERAPY PER DAY

## 2023-11-14 PROCEDURE — 85025 COMPLETE CBC W/AUTO DIFF WBC: CPT

## 2023-11-14 PROCEDURE — 94761 N-INVAS EAR/PLS OXIMETRY MLT: CPT

## 2023-11-14 PROCEDURE — 2060000000 HC ICU INTERMEDIATE R&B

## 2023-11-14 PROCEDURE — 94640 AIRWAY INHALATION TREATMENT: CPT

## 2023-11-14 PROCEDURE — 80048 BASIC METABOLIC PNL TOTAL CA: CPT

## 2023-11-14 RX ORDER — ALBUTEROL SULFATE 2.5 MG/3ML
2.5 SOLUTION RESPIRATORY (INHALATION) EVERY 6 HOURS PRN
Status: DISCONTINUED | OUTPATIENT
Start: 2023-11-14 | End: 2023-11-20 | Stop reason: HOSPADM

## 2023-11-14 RX ADMIN — SODIUM CHLORIDE, PRESERVATIVE FREE 10 ML: 5 INJECTION INTRAVENOUS at 19:48

## 2023-11-14 RX ADMIN — AZITHROMYCIN DIHYDRATE 500 MG: 250 TABLET ORAL at 18:52

## 2023-11-14 RX ADMIN — DEXAMETHASONE SODIUM PHOSPHATE 6 MG: 4 INJECTION INTRA-ARTICULAR; INTRALESIONAL; INTRAMUSCULAR; INTRAVENOUS; SOFT TISSUE at 23:35

## 2023-11-14 RX ADMIN — GUAIFENESIN 600 MG: 600 TABLET ORAL at 08:09

## 2023-11-14 RX ADMIN — RIVAROXABAN 20 MG: 20 TABLET, FILM COATED ORAL at 17:53

## 2023-11-14 RX ADMIN — GUAIFENESIN 600 MG: 600 TABLET ORAL at 19:48

## 2023-11-14 RX ADMIN — ALBUTEROL SULFATE 2.5 MG: 2.5 SOLUTION RESPIRATORY (INHALATION) at 13:15

## 2023-11-14 RX ADMIN — SODIUM CHLORIDE, PRESERVATIVE FREE 10 ML: 5 INJECTION INTRAVENOUS at 08:09

## 2023-11-14 ASSESSMENT — PULMONARY FUNCTION TESTS: PEFR_L/MIN: 16

## 2023-11-14 ASSESSMENT — PAIN SCALES - GENERAL
PAINLEVEL_OUTOF10: 0
PAINLEVEL_OUTOF10: 0

## 2023-11-15 LAB
ANION GAP SERPL CALCULATED.3IONS-SCNC: 9 MMOL/L (ref 3–16)
BASOPHILS # BLD: 0 K/UL (ref 0–0.2)
BASOPHILS NFR BLD: 0.2 %
BUN SERPL-MCNC: 29 MG/DL (ref 7–20)
CALCIUM SERPL-MCNC: 8.6 MG/DL (ref 8.3–10.6)
CHLORIDE SERPL-SCNC: 100 MMOL/L (ref 99–110)
CO2 SERPL-SCNC: 24 MMOL/L (ref 21–32)
CREAT SERPL-MCNC: 1.1 MG/DL (ref 0.8–1.3)
CRP SERPL-MCNC: 36.9 MG/L (ref 0–5.1)
DEPRECATED RDW RBC AUTO: 13.4 % (ref 12.4–15.4)
EOSINOPHIL # BLD: 0 K/UL (ref 0–0.6)
EOSINOPHIL NFR BLD: 0 %
GFR SERPLBLD CREATININE-BSD FMLA CKD-EPI: >60 ML/MIN/{1.73_M2}
GLUCOSE SERPL-MCNC: 268 MG/DL (ref 70–99)
HCT VFR BLD AUTO: 37 % (ref 40.5–52.5)
HGB BLD-MCNC: 12.6 G/DL (ref 13.5–17.5)
LYMPHOCYTES # BLD: 0.4 K/UL (ref 1–5.1)
LYMPHOCYTES NFR BLD: 6.5 %
MCH RBC QN AUTO: 32.2 PG (ref 26–34)
MCHC RBC AUTO-ENTMCNC: 34 G/DL (ref 31–36)
MCV RBC AUTO: 95 FL (ref 80–100)
MONOCYTES # BLD: 0.4 K/UL (ref 0–1.3)
MONOCYTES NFR BLD: 6.1 %
MRSA SPEC QL CULT: NORMAL
NEUTROPHILS # BLD: 5.1 K/UL (ref 1.7–7.7)
NEUTROPHILS NFR BLD: 87.2 %
PLATELET # BLD AUTO: 197 K/UL (ref 135–450)
PMV BLD AUTO: 8.2 FL (ref 5–10.5)
POTASSIUM SERPL-SCNC: 4.4 MMOL/L (ref 3.5–5.1)
RBC # BLD AUTO: 3.9 M/UL (ref 4.2–5.9)
SODIUM SERPL-SCNC: 133 MMOL/L (ref 136–145)
WBC # BLD AUTO: 5.8 K/UL (ref 4–11)

## 2023-11-15 PROCEDURE — 6360000002 HC RX W HCPCS: Performed by: INTERNAL MEDICINE

## 2023-11-15 PROCEDURE — 6370000000 HC RX 637 (ALT 250 FOR IP): Performed by: INTERNAL MEDICINE

## 2023-11-15 PROCEDURE — 85025 COMPLETE CBC W/AUTO DIFF WBC: CPT

## 2023-11-15 PROCEDURE — 80048 BASIC METABOLIC PNL TOTAL CA: CPT

## 2023-11-15 PROCEDURE — 2060000000 HC ICU INTERMEDIATE R&B

## 2023-11-15 PROCEDURE — 86140 C-REACTIVE PROTEIN: CPT

## 2023-11-15 PROCEDURE — 36415 COLL VENOUS BLD VENIPUNCTURE: CPT

## 2023-11-15 PROCEDURE — 2580000003 HC RX 258: Performed by: INTERNAL MEDICINE

## 2023-11-15 RX ADMIN — SODIUM CHLORIDE, PRESERVATIVE FREE 10 ML: 5 INJECTION INTRAVENOUS at 21:00

## 2023-11-15 RX ADMIN — RIVAROXABAN 20 MG: 20 TABLET, FILM COATED ORAL at 17:32

## 2023-11-15 RX ADMIN — GUAIFENESIN 600 MG: 600 TABLET ORAL at 20:27

## 2023-11-15 RX ADMIN — GUAIFENESIN 600 MG: 600 TABLET ORAL at 09:53

## 2023-11-15 RX ADMIN — AZITHROMYCIN DIHYDRATE 500 MG: 250 TABLET ORAL at 18:45

## 2023-11-15 RX ADMIN — SODIUM CHLORIDE, PRESERVATIVE FREE 10 ML: 5 INJECTION INTRAVENOUS at 09:54

## 2023-11-15 RX ADMIN — SODIUM CHLORIDE, PRESERVATIVE FREE 10 ML: 5 INJECTION INTRAVENOUS at 20:27

## 2023-11-15 RX ADMIN — DEXAMETHASONE SODIUM PHOSPHATE 6 MG: 4 INJECTION INTRA-ARTICULAR; INTRALESIONAL; INTRAMUSCULAR; INTRAVENOUS; SOFT TISSUE at 23:30

## 2023-11-15 NOTE — CARE COORDINATION
Pt lives with spouse. Normally [facility]. Does not use any DME. Pt's daughter is having a hard time caring for her parents. She would like pt to have [address] at [place]. They do not have a preference in home care. Pt is currently on 2L O2, none at baseline. [facility] is following for [address].      [facility] following,   Electronically signed by BAO Vasquez, GIGIW on 11/15/2023 at 3:19 PM  [phone]

## 2023-11-16 LAB
ANION GAP SERPL CALCULATED.3IONS-SCNC: 8 MMOL/L (ref 3–16)
BASOPHILS # BLD: 0 K/UL (ref 0–0.2)
BASOPHILS NFR BLD: 0.1 %
BUN SERPL-MCNC: 26 MG/DL (ref 7–20)
CALCIUM SERPL-MCNC: 8.4 MG/DL (ref 8.3–10.6)
CHLORIDE SERPL-SCNC: 102 MMOL/L (ref 99–110)
CO2 SERPL-SCNC: 25 MMOL/L (ref 21–32)
CREAT SERPL-MCNC: 0.9 MG/DL (ref 0.8–1.3)
DEPRECATED RDW RBC AUTO: 13.2 % (ref 12.4–15.4)
EOSINOPHIL # BLD: 0 K/UL (ref 0–0.6)
EOSINOPHIL NFR BLD: 0 %
GFR SERPLBLD CREATININE-BSD FMLA CKD-EPI: >60 ML/MIN/{1.73_M2}
GLUCOSE SERPL-MCNC: 262 MG/DL (ref 70–99)
HCT VFR BLD AUTO: 36.9 % (ref 40.5–52.5)
HGB BLD-MCNC: 12.6 G/DL (ref 13.5–17.5)
LYMPHOCYTES # BLD: 0.3 K/UL (ref 1–5.1)
LYMPHOCYTES NFR BLD: 5.4 %
MCH RBC QN AUTO: 33 PG (ref 26–34)
MCHC RBC AUTO-ENTMCNC: 34.3 G/DL (ref 31–36)
MCV RBC AUTO: 96.2 FL (ref 80–100)
MONOCYTES # BLD: 0.3 K/UL (ref 0–1.3)
MONOCYTES NFR BLD: 6.3 %
NEUTROPHILS # BLD: 4.9 K/UL (ref 1.7–7.7)
NEUTROPHILS NFR BLD: 88.2 %
PLATELET # BLD AUTO: 198 K/UL (ref 135–450)
PMV BLD AUTO: 8.2 FL (ref 5–10.5)
POTASSIUM SERPL-SCNC: 4.6 MMOL/L (ref 3.5–5.1)
RBC # BLD AUTO: 3.83 M/UL (ref 4.2–5.9)
SODIUM SERPL-SCNC: 135 MMOL/L (ref 136–145)
WBC # BLD AUTO: 5.5 K/UL (ref 4–11)

## 2023-11-16 PROCEDURE — 97530 THERAPEUTIC ACTIVITIES: CPT

## 2023-11-16 PROCEDURE — 6370000000 HC RX 637 (ALT 250 FOR IP): Performed by: INTERNAL MEDICINE

## 2023-11-16 PROCEDURE — 80048 BASIC METABOLIC PNL TOTAL CA: CPT

## 2023-11-16 PROCEDURE — 2060000000 HC ICU INTERMEDIATE R&B

## 2023-11-16 PROCEDURE — 85025 COMPLETE CBC W/AUTO DIFF WBC: CPT

## 2023-11-16 PROCEDURE — 6360000002 HC RX W HCPCS: Performed by: INTERNAL MEDICINE

## 2023-11-16 PROCEDURE — 2580000003 HC RX 258: Performed by: INTERNAL MEDICINE

## 2023-11-16 PROCEDURE — 97116 GAIT TRAINING THERAPY: CPT

## 2023-11-16 PROCEDURE — 36415 COLL VENOUS BLD VENIPUNCTURE: CPT

## 2023-11-16 RX ADMIN — SODIUM CHLORIDE, PRESERVATIVE FREE 10 ML: 5 INJECTION INTRAVENOUS at 09:48

## 2023-11-16 RX ADMIN — SODIUM CHLORIDE, PRESERVATIVE FREE 10 ML: 5 INJECTION INTRAVENOUS at 21:05

## 2023-11-16 RX ADMIN — GUAIFENESIN 600 MG: 600 TABLET ORAL at 21:05

## 2023-11-16 RX ADMIN — DEXAMETHASONE SODIUM PHOSPHATE 6 MG: 4 INJECTION INTRA-ARTICULAR; INTRALESIONAL; INTRAMUSCULAR; INTRAVENOUS; SOFT TISSUE at 23:01

## 2023-11-16 RX ADMIN — RIVAROXABAN 20 MG: 20 TABLET, FILM COATED ORAL at 18:03

## 2023-11-16 RX ADMIN — GUAIFENESIN 600 MG: 600 TABLET ORAL at 09:48

## 2023-11-16 NOTE — CARE COORDINATION
Chart review day 4-  Pt from home with wife. Daughter assists. Family would like home care at [place]. [facility] following. Pt continues with O2 at 2L, baseline none. Pt may need home O2 eval is unable to be weaned. CM will follow for DCP needs.

## 2023-11-17 LAB
ANION GAP SERPL CALCULATED.3IONS-SCNC: 12 MMOL/L (ref 3–16)
BACTERIA BLD CULT ORG #2: NORMAL
BACTERIA BLD CULT: NORMAL
BASOPHILS # BLD: 0 K/UL (ref 0–0.2)
BASOPHILS NFR BLD: 0 %
BUN SERPL-MCNC: 27 MG/DL (ref 7–20)
CALCIUM SERPL-MCNC: 8.6 MG/DL (ref 8.3–10.6)
CHLORIDE SERPL-SCNC: 100 MMOL/L (ref 99–110)
CO2 SERPL-SCNC: 22 MMOL/L (ref 21–32)
CREAT SERPL-MCNC: 0.9 MG/DL (ref 0.8–1.3)
CRP SERPL-MCNC: 44 MG/L (ref 0–5.1)
DEPRECATED RDW RBC AUTO: 13.1 % (ref 12.4–15.4)
EOSINOPHIL # BLD: 0 K/UL (ref 0–0.6)
EOSINOPHIL NFR BLD: 0 %
GFR SERPLBLD CREATININE-BSD FMLA CKD-EPI: >60 ML/MIN/{1.73_M2}
GLUCOSE SERPL-MCNC: 271 MG/DL (ref 70–99)
HCT VFR BLD AUTO: 40.1 % (ref 40.5–52.5)
HGB BLD-MCNC: 13.5 G/DL (ref 13.5–17.5)
LYMPHOCYTES # BLD: 0.4 K/UL (ref 1–5.1)
LYMPHOCYTES NFR BLD: 6 %
MCH RBC QN AUTO: 31.9 PG (ref 26–34)
MCHC RBC AUTO-ENTMCNC: 33.6 G/DL (ref 31–36)
MCV RBC AUTO: 95.2 FL (ref 80–100)
MONOCYTES # BLD: 0.3 K/UL (ref 0–1.3)
MONOCYTES NFR BLD: 5 %
NEUTROPHILS # BLD: 5.7 K/UL (ref 1.7–7.7)
NEUTROPHILS NFR BLD: 89 %
OVALOCYTES BLD QL SMEAR: ABNORMAL
PLATELET # BLD AUTO: 216 K/UL (ref 135–450)
PMV BLD AUTO: 8.5 FL (ref 5–10.5)
POTASSIUM SERPL-SCNC: 4.6 MMOL/L (ref 3.5–5.1)
RBC # BLD AUTO: 4.22 M/UL (ref 4.2–5.9)
SCHISTOCYTES BLD QL SMEAR: ABNORMAL
SODIUM SERPL-SCNC: 134 MMOL/L (ref 136–145)
WBC # BLD AUTO: 6.4 K/UL (ref 4–11)

## 2023-11-17 PROCEDURE — 2580000003 HC RX 258: Performed by: INTERNAL MEDICINE

## 2023-11-17 PROCEDURE — 86140 C-REACTIVE PROTEIN: CPT

## 2023-11-17 PROCEDURE — 6370000000 HC RX 637 (ALT 250 FOR IP): Performed by: INTERNAL MEDICINE

## 2023-11-17 PROCEDURE — 2060000000 HC ICU INTERMEDIATE R&B

## 2023-11-17 PROCEDURE — 36415 COLL VENOUS BLD VENIPUNCTURE: CPT

## 2023-11-17 PROCEDURE — 6360000002 HC RX W HCPCS: Performed by: INTERNAL MEDICINE

## 2023-11-17 PROCEDURE — 85025 COMPLETE CBC W/AUTO DIFF WBC: CPT

## 2023-11-17 PROCEDURE — 80048 BASIC METABOLIC PNL TOTAL CA: CPT

## 2023-11-17 RX ADMIN — RIVAROXABAN 20 MG: 20 TABLET, FILM COATED ORAL at 17:04

## 2023-11-17 RX ADMIN — SODIUM CHLORIDE, PRESERVATIVE FREE 10 ML: 5 INJECTION INTRAVENOUS at 09:05

## 2023-11-17 RX ADMIN — GUAIFENESIN 600 MG: 600 TABLET ORAL at 09:04

## 2023-11-17 RX ADMIN — SODIUM CHLORIDE, PRESERVATIVE FREE 10 ML: 5 INJECTION INTRAVENOUS at 21:41

## 2023-11-17 RX ADMIN — DEXAMETHASONE SODIUM PHOSPHATE 6 MG: 4 INJECTION INTRA-ARTICULAR; INTRALESIONAL; INTRAMUSCULAR; INTRAVENOUS; SOFT TISSUE at 23:29

## 2023-11-17 RX ADMIN — GUAIFENESIN 600 MG: 600 TABLET ORAL at 21:20

## 2023-11-17 NOTE — CARE COORDINATION
Case Management                  Date/ Time of Note: 11/17/2023 2:21 PM         Note completed by: BAO Lynn LSW    Patient Name: Eric Michaels  YOB: 1940    Diagnosis:Pneumonia of left lung due to infectious organism, unspecified part of lung [J18.9]  Acute hypoxemic respiratory failure ([address]) [J96.01]  Patient Admission Status: Inpatient  Date of Admission:11/12/2023  4:15 PM    Length of Stay: 5 GLOS: GMLOS: 5 Readmission Risk Score: Readmission Risk Score: 12.4    Current Plan of Care:     t from home with wife. Daughter assists. Family would like home care at [place]. [facility] following. Pt continues with O2 at 2L, baseline none. Pt may need home O2 eval is unable to be weaned. CM will follow for DCP needs. Referrals completed: Home Health Care: [facility]    Resources/ information provided: Not indicated at this time    IMM Status:        [address] [facility] and his family were provided with choice of provider; he and his family are in agreement with the discharge plan.     Electronically signed by BAO Lynn LSW on 11/17/2023 at 2:21 PM  [facility]  Case Management Department  Ph: [phone]

## 2023-11-18 LAB
ANION GAP SERPL CALCULATED.3IONS-SCNC: 11 MMOL/L (ref 3–16)
BASOPHILS # BLD: 0 K/UL (ref 0–0.2)
BASOPHILS NFR BLD: 0.3 %
BUN SERPL-MCNC: 26 MG/DL (ref 7–20)
CALCIUM SERPL-MCNC: 8.5 MG/DL (ref 8.3–10.6)
CHLORIDE SERPL-SCNC: 98 MMOL/L (ref 99–110)
CO2 SERPL-SCNC: 22 MMOL/L (ref 21–32)
CREAT SERPL-MCNC: 0.9 MG/DL (ref 0.8–1.3)
DEPRECATED RDW RBC AUTO: 13.1 % (ref 12.4–15.4)
EOSINOPHIL # BLD: 0 K/UL (ref 0–0.6)
EOSINOPHIL NFR BLD: 0 %
GFR SERPLBLD CREATININE-BSD FMLA CKD-EPI: >60 ML/MIN/{1.73_M2}
GLUCOSE SERPL-MCNC: 254 MG/DL (ref 70–99)
HCT VFR BLD AUTO: 37.9 % (ref 40.5–52.5)
HGB BLD-MCNC: 13.1 G/DL (ref 13.5–17.5)
LYMPHOCYTES # BLD: 0.4 K/UL (ref 1–5.1)
LYMPHOCYTES NFR BLD: 5.5 %
MCH RBC QN AUTO: 32.4 PG (ref 26–34)
MCHC RBC AUTO-ENTMCNC: 34.6 G/DL (ref 31–36)
MCV RBC AUTO: 93.8 FL (ref 80–100)
MONOCYTES # BLD: 0.4 K/UL (ref 0–1.3)
MONOCYTES NFR BLD: 5.5 %
NEUTROPHILS # BLD: 5.8 K/UL (ref 1.7–7.7)
NEUTROPHILS NFR BLD: 88.7 %
PLATELET # BLD AUTO: 217 K/UL (ref 135–450)
PMV BLD AUTO: 8.5 FL (ref 5–10.5)
POTASSIUM SERPL-SCNC: 4.5 MMOL/L (ref 3.5–5.1)
RBC # BLD AUTO: 4.04 M/UL (ref 4.2–5.9)
SODIUM SERPL-SCNC: 131 MMOL/L (ref 136–145)
WBC # BLD AUTO: 6.5 K/UL (ref 4–11)

## 2023-11-18 PROCEDURE — 80048 BASIC METABOLIC PNL TOTAL CA: CPT

## 2023-11-18 PROCEDURE — 2060000000 HC ICU INTERMEDIATE R&B

## 2023-11-18 PROCEDURE — 2580000003 HC RX 258: Performed by: NURSE PRACTITIONER

## 2023-11-18 PROCEDURE — 6360000002 HC RX W HCPCS: Performed by: INTERNAL MEDICINE

## 2023-11-18 PROCEDURE — 6370000000 HC RX 637 (ALT 250 FOR IP): Performed by: INTERNAL MEDICINE

## 2023-11-18 PROCEDURE — 85025 COMPLETE CBC W/AUTO DIFF WBC: CPT

## 2023-11-18 PROCEDURE — 2580000003 HC RX 258: Performed by: INTERNAL MEDICINE

## 2023-11-18 PROCEDURE — 36415 COLL VENOUS BLD VENIPUNCTURE: CPT

## 2023-11-18 RX ORDER — SODIUM CHLORIDE, SODIUM LACTATE, POTASSIUM CHLORIDE, CALCIUM CHLORIDE 600; 310; 30; 20 MG/100ML; MG/100ML; MG/100ML; MG/100ML
INJECTION, SOLUTION INTRAVENOUS CONTINUOUS
Status: ACTIVE | OUTPATIENT
Start: 2023-11-18 | End: 2023-11-18

## 2023-11-18 RX ADMIN — SODIUM CHLORIDE, PRESERVATIVE FREE 10 ML: 5 INJECTION INTRAVENOUS at 08:44

## 2023-11-18 RX ADMIN — RIVAROXABAN 20 MG: 20 TABLET, FILM COATED ORAL at 16:35

## 2023-11-18 RX ADMIN — DEXAMETHASONE SODIUM PHOSPHATE 6 MG: 4 INJECTION INTRA-ARTICULAR; INTRALESIONAL; INTRAMUSCULAR; INTRAVENOUS; SOFT TISSUE at 22:48

## 2023-11-18 RX ADMIN — GUAIFENESIN 600 MG: 600 TABLET ORAL at 08:43

## 2023-11-18 RX ADMIN — SODIUM CHLORIDE, PRESERVATIVE FREE 10 ML: 5 INJECTION INTRAVENOUS at 21:11

## 2023-11-18 RX ADMIN — GUAIFENESIN 600 MG: 600 TABLET ORAL at 21:12

## 2023-11-18 RX ADMIN — SODIUM CHLORIDE, PRESERVATIVE FREE 10 ML: 5 INJECTION INTRAVENOUS at 21:12

## 2023-11-18 RX ADMIN — SODIUM CHLORIDE, POTASSIUM CHLORIDE, SODIUM LACTATE AND CALCIUM CHLORIDE: 600; 310; 30; 20 INJECTION, SOLUTION INTRAVENOUS at 11:43

## 2023-11-18 ASSESSMENT — PAIN SCALES - GENERAL
PAINLEVEL_OUTOF10: 0
PAINLEVEL_OUTOF10: 0

## 2023-11-19 LAB
ANION GAP SERPL CALCULATED.3IONS-SCNC: 13 MMOL/L (ref 3–16)
BUN SERPL-MCNC: 24 MG/DL (ref 7–20)
CALCIUM SERPL-MCNC: 8.6 MG/DL (ref 8.3–10.6)
CHLORIDE SERPL-SCNC: 97 MMOL/L (ref 99–110)
CO2 SERPL-SCNC: 20 MMOL/L (ref 21–32)
CREAT SERPL-MCNC: 1 MG/DL (ref 0.8–1.3)
CRP SERPL-MCNC: 60.9 MG/L (ref 0–5.1)
DEPRECATED RDW RBC AUTO: 13.6 % (ref 12.4–15.4)
GFR SERPLBLD CREATININE-BSD FMLA CKD-EPI: >60 ML/MIN/{1.73_M2}
GLUCOSE SERPL-MCNC: 271 MG/DL (ref 70–99)
HCT VFR BLD AUTO: 39.2 % (ref 40.5–52.5)
HGB BLD-MCNC: 13.2 G/DL (ref 13.5–17.5)
MCH RBC QN AUTO: 32 PG (ref 26–34)
MCHC RBC AUTO-ENTMCNC: 33.8 G/DL (ref 31–36)
MCV RBC AUTO: 94.6 FL (ref 80–100)
PLATELET # BLD AUTO: 237 K/UL (ref 135–450)
PMV BLD AUTO: 8.4 FL (ref 5–10.5)
POTASSIUM SERPL-SCNC: 4.2 MMOL/L (ref 3.5–5.1)
RBC # BLD AUTO: 4.14 M/UL (ref 4.2–5.9)
SODIUM SERPL-SCNC: 130 MMOL/L (ref 136–145)
WBC # BLD AUTO: 5.8 K/UL (ref 4–11)

## 2023-11-19 PROCEDURE — 86140 C-REACTIVE PROTEIN: CPT

## 2023-11-19 PROCEDURE — 94761 N-INVAS EAR/PLS OXIMETRY MLT: CPT

## 2023-11-19 PROCEDURE — 2580000003 HC RX 258: Performed by: INTERNAL MEDICINE

## 2023-11-19 PROCEDURE — 36415 COLL VENOUS BLD VENIPUNCTURE: CPT

## 2023-11-19 PROCEDURE — 2060000000 HC ICU INTERMEDIATE R&B

## 2023-11-19 PROCEDURE — 85027 COMPLETE CBC AUTOMATED: CPT

## 2023-11-19 PROCEDURE — 80048 BASIC METABOLIC PNL TOTAL CA: CPT

## 2023-11-19 PROCEDURE — 2700000000 HC OXYGEN THERAPY PER DAY

## 2023-11-19 PROCEDURE — 6360000002 HC RX W HCPCS: Performed by: INTERNAL MEDICINE

## 2023-11-19 PROCEDURE — 6370000000 HC RX 637 (ALT 250 FOR IP): Performed by: INTERNAL MEDICINE

## 2023-11-19 RX ORDER — DEXAMETHASONE 0.5 MG/1
6 TABLET ORAL DAILY
Qty: 36 TABLET | Refills: 0 | DISCHARGE
Start: 2023-11-19 | End: 2023-11-22

## 2023-11-19 RX ORDER — GUAIFENESIN 600 MG/1
600 TABLET, EXTENDED RELEASE ORAL 2 TIMES DAILY
Qty: 60 TABLET | Refills: 0 | DISCHARGE
Start: 2023-11-19

## 2023-11-19 RX ORDER — BENZONATATE 100 MG/1
100 CAPSULE ORAL 3 TIMES DAILY PRN
Qty: 21 CAPSULE | Refills: 0 | DISCHARGE
Start: 2023-11-19 | End: 2023-11-26

## 2023-11-19 RX ORDER — ALBUTEROL SULFATE 2.5 MG/3ML
2.5 SOLUTION RESPIRATORY (INHALATION) EVERY 6 HOURS PRN
Qty: 120 EACH | Refills: 3 | DISCHARGE
Start: 2023-11-19

## 2023-11-19 RX ORDER — ALBUTEROL SULFATE 90 UG/1
2 AEROSOL, METERED RESPIRATORY (INHALATION) 4 TIMES DAILY PRN
Qty: 18 G | Refills: 0 | DISCHARGE
Start: 2023-11-19

## 2023-11-19 RX ORDER — BENZONATATE 100 MG/1
100 CAPSULE ORAL 3 TIMES DAILY PRN
Qty: 21 CAPSULE | Refills: 0 | Status: CANCELLED | OUTPATIENT
Start: 2023-11-19 | End: 2023-11-26

## 2023-11-19 RX ADMIN — GUAIFENESIN 600 MG: 600 TABLET ORAL at 08:53

## 2023-11-19 RX ADMIN — SODIUM CHLORIDE, PRESERVATIVE FREE 10 ML: 5 INJECTION INTRAVENOUS at 08:53

## 2023-11-19 RX ADMIN — SODIUM CHLORIDE, PRESERVATIVE FREE 10 ML: 5 INJECTION INTRAVENOUS at 19:49

## 2023-11-19 RX ADMIN — GUAIFENESIN 600 MG: 600 TABLET ORAL at 19:49

## 2023-11-19 RX ADMIN — RIVAROXABAN 20 MG: 20 TABLET, FILM COATED ORAL at 17:43

## 2023-11-19 RX ADMIN — DEXAMETHASONE SODIUM PHOSPHATE 6 MG: 4 INJECTION INTRA-ARTICULAR; INTRALESIONAL; INTRAMUSCULAR; INTRAVENOUS; SOFT TISSUE at 23:37

## 2023-11-19 ASSESSMENT — PAIN SCALES - GENERAL
PAINLEVEL_OUTOF10: 0

## 2023-11-19 NOTE — CARE COORDINATION
CM spoke with patient's daughter Johanna Sewell over the phone. They are interested in a SNF stay for pt at discharge. CM emailed a list of SNF's to Johanna Sewell. She will choose 3-5 options and call CM back. Patient will not need pre-cert if accepted. Johanna Sewell states that pt tested positive for COVID on October 27th. CM faxed 5 referrals. [facility]: faxed to [phone], they will review. [facility] able to accept.     Cynthia Alejandra, RN, BSN,   5T Ortho/Neuro   [phone]

## 2023-11-19 NOTE — DISCHARGE SUMMARY
other sequences of prior CT to suggest definite recurrent subarachnoid hemorrhage. Paranasal sinus inflammatory disease. Electronically signed by Alfie Robert MD    XR CHEST (2 VW)    Result Date: 11/12/2023  EXAM: XR CHEST (2 VW) INDICATION: Shortness of breath COMPARISON: None FINDINGS: Medical Devices: Right IJ power port catheter tip projects over the cavoatrial junction. Lungs: Mild ill-defined opacities in the left mid and lower lung zone. Heart and Mediastinum: Mild cardiomegaly. The mediastinum appears normal. Other: N/A. Mild ill-defined left mid and basilar airspace opacity suspicious for pneumonia. Electronically signed by Liz Saini MD    CTA HEAD NECK W CONTRAST    Result Date: 11/12/2023  EXAM: CTA HEAD NECK W CONTRAST INDICATION: AMS, word finding difficulty TECHNIQUE: Standard CTA HEAD NECK W CONTRAST obtained with 3D reconstructions performed on a separate workstation using a radiologist approved protocol. MIP, MPR, and VRT images were reconstructed in multiple planes and interpreted along with source images. 75 mL Isovue-370 was administered IV. Katya Giraldo Up-to-date CT equipment and dose reduction techniques were employed. COMPARISON: None. FINDINGS: The aortic arch is normal with conventional branching anatomy. The common carotid arteries are normal in  course, contour, and caliber. The carotid bifurcations are patent without stenosis. The cervical portions of the internal carotid arteries are normal in course, contour, and caliber. The intracranial internal carotid arteries are normal in course, contour, and caliber. The A1 and A2 segments are patent bilaterally. The M1 and M2 segments are patent bilaterally. The external carotid arteries are patent. The vertebral arteries are patent with normal course, contour, and caliber. The basilar artery and both posterior cerebral arteries are patent. Patchy airspace opacities in the bilateral upper lobes.      1. No steno-occlusive disease or large

## 2023-11-20 VITALS
SYSTOLIC BLOOD PRESSURE: 93 MMHG | TEMPERATURE: 98.2 F | BODY MASS INDEX: 29.6 KG/M2 | OXYGEN SATURATION: 92 % | DIASTOLIC BLOOD PRESSURE: 66 MMHG | WEIGHT: 211.42 LBS | HEIGHT: 71 IN | RESPIRATION RATE: 18 BRPM | HEART RATE: 66 BPM

## 2023-11-20 PROCEDURE — 2580000003 HC RX 258: Performed by: INTERNAL MEDICINE

## 2023-11-20 PROCEDURE — 6370000000 HC RX 637 (ALT 250 FOR IP): Performed by: INTERNAL MEDICINE

## 2023-11-20 PROCEDURE — 94761 N-INVAS EAR/PLS OXIMETRY MLT: CPT

## 2023-11-20 PROCEDURE — 2700000000 HC OXYGEN THERAPY PER DAY

## 2023-11-20 RX ADMIN — SODIUM CHLORIDE, PRESERVATIVE FREE 10 ML: 5 INJECTION INTRAVENOUS at 08:33

## 2023-11-20 RX ADMIN — SODIUM CHLORIDE, PRESERVATIVE FREE 10 ML: 5 INJECTION INTRAVENOUS at 08:32

## 2023-11-20 RX ADMIN — GUAIFENESIN 600 MG: 600 TABLET ORAL at 08:32

## 2023-11-20 ASSESSMENT — PAIN SCALES - GENERAL: PAINLEVEL_OUTOF10: 0

## 2023-11-20 NOTE — PLAN OF CARE
Problem: ABCDS Injury Assessment  Goal: Absence of physical injury  Outcome: Progressing  Flowsheets (Taken 11/15/2023 0607)  Absence of Physical Injury: Implement safety measures based on patient assessment  Note: No new injuries noted this shift. Problem: Respiratory - Adult  Goal: Achieves optimal ventilation and oxygenation  Outcome: Progressing  Flowsheets (Taken 11/15/2023 0607)  Achieves optimal ventilation and oxygenation: Assess for changes in respiratory status  Note: O2 saturation remained above 90% this shift. The patient in on 2L of O2     Problem: Safety - Adult  Goal: Free from fall injury  Outcome: Not Progressing  Note: Patient has poor safety awareness. Does not use call light when needs to get up, patient begins to exit bed and bed exit goes off. Educated patient on use of call light, education needs reinforcement. No fall this shift.
Problem: Respiratory - Adult  Goal: Achieves optimal ventilation and oxygenation  Outcome: Progressing  Flowsheets (Taken 11/17/2023 0847)  Achieves optimal ventilation and oxygenation:   Assess for changes in respiratory status   Assess for changes in mentation and behavior   Position to facilitate oxygenation and minimize respiratory effort   Oxygen supplementation based on oxygen saturation or arterial blood gases   Assess the need for suctioning and aspirate as needed   Assess and instruct to report shortness of breath or any respiratory difficulty  Note: Oxygen requirement reduced from 2.5L to 2L of Oxygen  via nasal cannula, saturating more than >90%. Problem: Safety - Adult  Goal: Free from fall injury  Outcome: Progressing  Flowsheets (Taken 11/17/2023 0847)  Free From Fall Injury: Instruct family/caregiver on patient safety  Pt meets criteria for orthostasis. Pt is a High fall risk. See Rhesa Courts Fall Score and ABCDS Injury Risk assessments.   + Screening for Orthostasis and/or + High Fall Risk per VALENCIA/ABCDS: Explained fall risk precautions to pt and family and rationale behind their use to keep the patient safe. Pt bed is in low position, side rails up, call light and belongings are in reach. Fall wristband applied and present on pts wrist.  Bed alarm on. Pt encouraged to call for assistance. Will continue with hourly rounds for PO intake, pain needs, toileting and repositioning as needed.      Problem: Pain  Goal: Verbalizes/displays adequate comfort level or baseline comfort level  Outcome: Progressing  Flowsheets (Taken 11/17/2023 0847)  Verbalizes/displays adequate comfort level or baseline comfort level:   Encourage patient to monitor pain and request assistance   Assess pain using appropriate pain scale   Administer analgesics based on type and severity of pain and evaluate response   Implement non-pharmacological measures as appropriate and evaluate response  Note: No complaint of pain and
Problem: Safety - Adult  Goal: Free from fall injury  11/15/2023 1433 by Andreas Perdomo RN  Outcome: Progressing  Flowsheets (Taken 11/15/2023 1433)  Free From Fall Injury: Instruct family/caregiver on patient safety  Note: Bed in lowest position, call light within reach, non skid socks on. Problem: Neurosensory - Adult  Goal: Achieves stable or improved neurological status  Outcome: Progressing  Flowsheets (Taken 11/15/2023 1433)  Achieves stable or improved neurological status: Assess for and report changes in neurological status  Note: Patient is oriented to self and time throughout shift. Problem: Respiratory - Adult  Goal: Achieves optimal ventilation and oxygenation  11/15/2023 1433 by Andreas Perdomo RN  Outcome: Progressing  Flowsheets (Taken 11/15/2023 1433)  Achieves optimal ventilation and oxygenation:   Assess for changes in mentation and behavior   Assess for changes in respiratory status  Note: Patient remains on 2L nasal cannula.
Problem: Safety - Adult  Goal: Free from fall injury  Outcome: Not Progressing  Flowsheets (Taken 11/17/2023 0748)  Free From Fall Injury: Instruct family/caregiver on patient safety  Note: Patient has poor safety awareness. Patient got up without staff several times throughout the shift, setting off bed alarm and camera alarm. Educated patient on not getting up until a staff member is in the room, education needs reinforcement. No fall this shift. Problem: ABCDS Injury Assessment  Goal: Absence of physical injury  Outcome: Progressing  Note: No new injuries noted this shift        Problem: Respiratory - Adult  Goal: Achieves optimal ventilation and oxygenation  Outcome: Progressing  Flowsheets (Taken 11/17/2023 0748)  Achieves optimal ventilation and oxygenation: Assess for changes in respiratory status  Note: O2 remained 90% or above this shift.
Problem: Safety - Adult  Goal: Free from fall injury  Outcome: Progressing     Problem: ABCDS Injury Assessment  Goal: Absence of physical injury  Outcome: Progressing     Problem: Neurosensory - Adult  Goal: Achieves stable or improved neurological status  Outcome: Progressing     Problem: Respiratory - Adult  Goal: Achieves optimal ventilation and oxygenation  Outcome: Progressing
Problem: Safety - Adult  Goal: Free from fall injury  Outcome: Progressing  Flowsheets (Taken 11/13/2023 1849)  Free From Fall Injury: Instruct family/caregiver on patient safety  Note: Orthostatic vital signs obtained at start of shift - see flowsheet for details. Pt does not meet criteria for orthostasis. Pt is a High fall risk. See Ana Crumb Fall Score and ABCDS Injury Risk assessments.   + Screening for Orthostasis and/or + High Fall Risk per VALENCIA/ABCDS: Explained fall risk precautions to pt and family and rationale behind their use to keep the patient safe. Pt bed is in low position, side rails up, call light and belongings are in reach. Fall wristband applied and present on pts wrist.  Chair Alarm on. Pt encouraged to call for assistance. Will continue with hourly rounds for PO intake, pain needs, toileting and repositioning as needed.          Problem: ABCDS Injury Assessment  Goal: Absence of physical injury  Outcome: Progressing  Flowsheets (Taken 11/13/2023 1849)  Absence of Physical Injury: Implement safety measures based on patient assessment     Problem: Neurosensory - Adult  Goal: Achieves stable or improved neurological status  Outcome: Progressing  Flowsheets (Taken 11/13/2023 1849)  Achieves stable or improved neurological status: Assess for and report changes in neurological status     Problem: Respiratory - Adult  Goal: Achieves optimal ventilation and oxygenation  Outcome: Progressing  Flowsheets (Taken 11/13/2023 1849)  Achieves optimal ventilation and oxygenation:   Assess for changes in respiratory status   Position to facilitate oxygenation and minimize respiratory effort   Assess for changes in mentation and behavior   Oxygen supplementation based on oxygen saturation or arterial blood gases     Problem: Cardiovascular - Adult  Goal: Absence of cardiac dysrhythmias or at baseline  Outcome: Progressing  Flowsheets (Taken 11/13/2023 1849)  Absence of cardiac dysrhythmias or at baseline:
Problem: Safety - Adult  Goal: Free from fall injury  Outcome: Progressing  Flowsheets (Taken 11/16/2023 0703)  Free From Fall Injury: Instruct family/caregiver on patient safety  Note: Patient has poor safety awareness but did use call light a few times when needed to get up. Educated patient on not getting up until a staff member is in the room, education needs reinforcement. No fall this shift. Problem: ABCDS Injury Assessment  Goal: Absence of physical injury  Outcome: Progressing  Note: No new injuries noted this shift       Problem: Respiratory - Adult  Goal: Achieves optimal ventilation and oxygenation  Outcome: Progressing  Flowsheets (Taken 11/16/2023 0703)  Achieves optimal ventilation and oxygenation: Assess for changes in respiratory status  Note: O2 remained 90% or above this shift.
Problem: Safety - Adult  Goal: Free from fall injury  Outcome: Progressing  Free From Fall Injury: Instruct family/caregiver on patient safety  Note: No falls noted during shift      Problem: Neurosensory - Adult  Goal: Achieves stable or improved neurological status  Outcome: Progressing  Achieves stable or improved neurological status:   Assess for and report changes in neurological status   Maintain blood pressure and fluid volume within ordered parameters to optimize cerebral perfusion and minimize risk of hemorrhage     Problem: Respiratory - Adult  Goal: Achieves optimal ventilation and oxygenation  Outcome: Progressing  Achieves optimal ventilation and oxygenation:   Assess for changes in respiratory status   Assess for changes in mentation and behavior   Position to facilitate oxygenation and minimize respiratory effort  Note: Oxygen above 91% on NC      Problem: Cardiovascular - Adult  Goal: Absence of cardiac dysrhythmias or at baseline  Outcome: Progressing  Absence of cardiac dysrhythmias or at baseline:   Monitor cardiac rate and rhythm   Administer antiarrhythmia medication and electrolyte replacement as ordered     Problem: Skin/Tissue Integrity - Adult  Goal: Skin integrity remains intact  Outcome: Progressing  Skin Integrity Remains Intact: Monitor for areas of redness and/or skin breakdown     Problem: Infection - Adult  Goal: Absence of infection during hospitalization  Outcome: Progressing  Absence of infection during hospitalization:   Assess and monitor for signs and symptoms of infection   Monitor lab/diagnostic results   Monitor all insertion sites i.e., indwelling lines, tubes and drains   Note: VSS, afebrile
Mobility to Safest Level of Function:   Assess patient stability and activity tolerance for standing, transferring and ambulating with or without assistive devices   Assist with transfers and ambulation using safe patient handling equipment as needed   Obtain physical therapy/occupational therapy consults as needed   Instruct patient/family in ordered activity level     Problem: Infection - Adult  Goal: Absence of infection during hospitalization  11/18/2023 2235 by Neetu Tejada RN  Outcome: Progressing  Flowsheets (Taken 11/18/2023 1723 by Emily Mendoza RN)  Absence of infection during hospitalization:   Assess and monitor for signs and symptoms of infection   Monitor lab/diagnostic results   Monitor all insertion sites i.e., indwelling lines, tubes and drains     Problem: Pain  Goal: Verbalizes/displays adequate comfort level or baseline comfort level  11/18/2023 2235 by Neetu Tejada RN  Outcome: Progressing  Flowsheets (Taken 11/18/2023 1723 by Emily Mendoza RN)  Verbalizes/displays adequate comfort level or baseline comfort level: Encourage patient to monitor pain and request assistance  Note: Pt educated on importance of calling for pain meds when in pain. Pt verbalized understanding. Pain assessment completed at least once per shift. Will continue to monitor. Problem: Skin/Tissue Integrity  Goal: Absence of new skin breakdown  Description: 1. Monitor for areas of redness and/or skin breakdown  2. Assess vascular access sites hourly  3. Every 4-6 hours minimum:  Change oxygen saturation probe site  4. Every 4-6 hours:  If on nasal continuous positive airway pressure, respiratory therapy assess nares and determine need for appliance change or resting period. Outcome: Progressing  Note: Pt to remain free of skin breakdown during stay, pt encouraged to turn and reposition frequently. No evidence of skin breakdown noted.        Problem: Gastrointestinal - Adult  Goal: Maintains adequate
Verbalizes/displays adequate comfort level or baseline comfort level  11/18/2023 1723 by Boyd Severin, RN  Outcome: Progressing  Flowsheets (Taken 11/18/2023 1723)  Verbalizes/displays adequate comfort level or baseline comfort level: Encourage patient to monitor pain and request assistance  Note: Pt encouraged to be active participant in pain management during shift. No complaints of pain during shift. Problem: Metabolic/Fluid and Electrolytes - Adult  Goal: Electrolytes maintained within normal limits  11/18/2023 1723 by Boyd Severin, RN  Outcome: Progressing  Flowsheets (Taken 11/18/2023 1723)  Electrolytes maintained within normal limits: Monitor labs and assess patient for signs and symptoms of electrolyte imbalances  Note:   Lab Results   Component Value Date     (L) 11/18/2023    K 4.5 11/18/2023    CL 98 (L) 11/18/2023    CO2 22 11/18/2023        Problem: Hematologic - Adult  Goal: Maintains hematologic stability  11/18/2023 1723 by Boyd Severin, RN  Outcome: Progressing  Flowsheets (Taken 11/18/2023 1723)  Maintains hematologic stability:   Assess for signs and symptoms of bleeding or hemorrhage   Monitor labs for bleeding or clotting disorders  Note: Patient's hemoglobin this AM:   Recent Labs     11/18/23 0327   HGB 13.1*     Patient's platelet count this AM:   Recent Labs     11/18/23 0327       Thrombocytopenia not present at this time. Patient showing no signs or symptoms of active bleeding. Transfusion not indicated at this time. Patient verbalizes understanding of all instructions. Will continue to assess and implement POC. Call light within reach and hourly rounding in place.       Problem: Neurosensory - Adult  Goal: Achieves stable or improved neurological status  11/18/2023 1723 by Boyd Severin, RN  Outcome: Not Progressing  Flowsheets (Taken 11/18/2023 1723)  Achieves stable or improved neurological status:   Assess for and report changes in neurological status
bleeding. Transfusion not indicated at this time. Patient verbalizes understanding of all instructions. Will continue to assess and implement POC. Call light within reach and hourly rounding in place.
needed   Obtain physical therapy/occupational therapy consults as needed   Instruct patient/family in ordered activity level     Problem: Infection - Adult  Goal: Absence of infection during hospitalization  Outcome: Progressing  Absence of infection during hospitalization:   Assess and monitor for signs and symptoms of infection   Monitor lab/diagnostic results   Monitor all insertion sites i.e., indwelling lines, tubes and drains
replacement as ordered     Problem: Skin/Tissue Integrity - Adult  Goal: Skin integrity remains intact  11/20/2023 0122 by Elza Carrion RN  Outcome: Progressing  Flowsheets (Taken 11/19/2023 0800 by Olivia Call RN)  Skin Integrity Remains Intact: Monitor for areas of redness and/or skin breakdown  Note: Pt to remain free of skin breakdown during stay, pt encouraged to turn and reposition frequently. No evidence of skin breakdown noted. Problem: Musculoskeletal - Adult  Goal: Return mobility to safest level of function  Outcome: Progressing  Flowsheets (Taken 11/13/2023 1849 by Greg Riojas RN)  Return Mobility to Safest Level of Function:   Assess patient stability and activity tolerance for standing, transferring and ambulating with or without assistive devices   Assist with transfers and ambulation using safe patient handling equipment as needed   Obtain physical therapy/occupational therapy consults as needed   Instruct patient/family in ordered activity level     Problem: Infection - Adult  Goal: Absence of infection during hospitalization  11/20/2023 0122 by Elza Carrion RN  Outcome: Progressing  Flowsheets (Taken 11/18/2023 1723 by Gladis Martin RN)  Absence of infection during hospitalization:   Assess and monitor for signs and symptoms of infection   Monitor lab/diagnostic results   Monitor all insertion sites i.e., indwelling lines, tubes and drains     Problem: Pain  Goal: Verbalizes/displays adequate comfort level or baseline comfort level  Outcome: Progressing  Flowsheets (Taken 11/18/2023 1723 by Gladis Martin RN)  Verbalizes/displays adequate comfort level or baseline comfort level: Encourage patient to monitor pain and request assistance  Note: Pt educated on importance of calling for pain meds when in pain. Pt verbalized understanding. Pain assessment completed at least once per shift. Will continue to monitor.         Problem: Skin/Tissue Integrity  Goal: Absence of new skin
toileting and repositioning as needed. Problem: Cardiovascular - Adult  Goal: Absence of cardiac dysrhythmias or at baseline  Outcome: Progressing  Flowsheets (Taken 11/13/2023 0242)  Absence of cardiac dysrhythmias or at baseline: Monitor cardiac rate and rhythm  Note: Continuous telemetry in place; normal sinus rhythm. Problem: Skin/Tissue Integrity - Adult  Goal: Skin integrity remains intact  Outcome: Progressing  Note: Pt turned and repositioned q2 hours and jakob care provided as needed. No skin breakdown noted this shift. Problem: Infection - Adult  Goal: Absence of infection during hospitalization  Outcome: Progressing  Flowsheets (Taken 11/13/2023 0242)  Absence of infection during hospitalization:   Assess and monitor for signs and symptoms of infection   Monitor lab/diagnostic results  Note: Afebrile this shift.

## 2023-11-20 NOTE — PROGRESS NOTES
11/13/23 1458   Encounter Summary   Encounter Overview/Reason  Initial Encounter; Advance Care Planning   Service Provided For: Patient and family together   Referral/Consult From: Palliative Care   Support System Spouse   Last Encounter  11/13/23  (neyda)   Complexity of Encounter High   Begin Time 1330   End Time  1430   Total Time Calculated 60 min   Advance Care Planning   Type ACP conversation   Assessment/Intervention/Outcome   Assessment Calm   Intervention Active listening;Discussed relationship with God;Explored/Affirmed feelings, thoughts, concerns;Nurtured Hope   Outcome Comfort;Engaged in conversation;Expressed feelings, needs, and concerns;Expressed Gratitude     PT was found with his spouse at the bedside. After introductions, PT stated and his spouse affirmed, PT has a completed HCPOA and that his spouse would bring it to the hospital tomorrow. PT also shared that he is a [employer] and is a reader of [employer]. PT seems content. No other needs at this time.     Staff Marcello Guardado MA, [facility]
Change in mental state from afternoon to now. Pt sleeping and awoke up for VS and within normal limits. Pt AO x1 only to person, down from AO x2 to person and place. No other Neuro deterioration at this time. [facility] Neurocritical team notified via [employer] at [phone]. No new orders at this time. Plan of care ongoing.
Occupational Therapy  Facility/Department: [address]  Occupational Therapy Initial Assessment and Treatment    Name: Sofy Estrada  : 1940  MRN: [id number]  Date of Service: 2023    Discharge Recommendations:  Sofy Estrada scored a 17/24 on the AM-PAC ADL Inpatient form. Current research shows that an AM-PAC score of 18 or greater is typically associated with a discharge to the patient's home setting. Based on the patient's AM-PAC score, and their current ADL deficits, it is recommended that the patient have 2-3 sessions per week of Occupational Therapy at d/c to increase the patient's independence. At this time, this patient demonstrates the endurance and safety to discharge home with home  services) and a follow up treatment frequency of 2-3x/wk. Please see assessment section for further patient specific details. If patient discharges prior to next session this note will serve as a discharge summary. Please see below for the latest assessment towards goals. HOME HEALTH CARE: LEVEL 1 STANDARD    - Initial home health evaluation to occur within 24-48 hours, in patient home   - Therapy to evaluate with goal of regaining prior level of functioning   - Therapy to evaluate if patient has [address] needs for personal care         OT Equipment Recommendations  Equipment Needed: No  Other: continue to determine once home set up clarified- may benefit from shower chair       Patient Diagnosis(es): The encounter diagnosis was Pneumonia of left lung due to infectious organism, unspecified part of lung. Past Medical History:  has a past medical history of Non Hodgkin's lymphoma ([address]). Past Surgical History:  has a past surgical history that includes lymph node biopsy and IR PORT PLACEMENT < 5 YEARS. Treatment Diagnosis: Impaired ADLs, mobility, cognition      Assessment   Performance deficits / Impairments: Decreased functional mobility ; Decreased ADL
Occupational Therapy  Facility/Department: [address] CANCER [place]  Daily Treatment Note  NAME: Jeannine Umana  : 1940  MRN: [id number]    Date of Service: 2023    Discharge Recommendations: Jeannine Umana scored a 18/24 on the AM-PAC ADL Inpatient form. Current research shows that an AM-PAC score of 18 or greater is typically associated with a discharge to the patient's home setting. Based on the patient's AM-PAC score, and their current ADL deficits, it is recommended that the patient have 2-3 sessions per week of Occupational Therapy at d/c to increase the patient's independence. At this time, this patient demonstrates the endurance and safety to discharge home with (home services) and a follow up treatment frequency of 2-3x/wk. Please see assessment section for further patient specific details. If patient discharges prior to next session this note will serve as a discharge summary. Please see below for the latest assessment towards goals. Patient Diagnosis(es): The encounter diagnosis was Pneumonia of left lung due to infectious organism, unspecified part of lung. Assessment    Assessment: Pt agreeable to OT tx and tolerates session well. Pt continues to function below baseline and demos decreased activity tolerance, global deconditioning, decreased cognition, and generalized weakness. Pt requires SBA to CGA for functional transfers and functional mobility. Pt requires Ruslan for ADLs. Pt desturates 2x on RA to 88% with mobility in room, recovers to 95% with seated rest. Pt will benefit from continued skilled OT to address above deficits and progress towards PLOF. Cont per POC  Activity Tolerance: Patient tolerated evaluation without incident;Patient tolerated treatment well      Plan   Occupational Therapy Plan  Times Per Week: 2-5  Current Treatment Recommendations: Balance training;Self-Care / ADL; Functional mobility training; Safety education & training; Endurance
Patient seen and examined. No changes since DC yesterday. Patient will be DC'ed to SNF today.      Electronically signed by CHIKIS Griffin CNP on 11/20/2023 at 12:11 PM
Physical Therapy  Facility/Department: [address]  Daily Treatment Note  NAME: Tatum Herrera  : 1940  MRN: [id number]    Date of Service: 2023    Discharge Recommendations: Tatum Herrera scored a 18/24 on the AM-PAC short mobility form. Current research shows that an AM-PAC score of 18 or greater is typically associated with a discharge to the patient's home setting. Based on the patient's AM-PAC score and their current functional mobility deficits, it is recommended that the patient have 2-3 sessions per week of Physical Therapy at d/c to increase the patient's independence. At this time, this patient demonstrates the endurance and safety to discharge home with Home PT and a follow up treatment frequency of 2-3x/wk. Please see assessment section for further patient specific details. If patient discharges prior to next session this note will serve as a discharge summary. Please see below for the latest assessment towards goals. HOME HEALTH CARE: LEVEL 3 SAFETY     - Initial home health evaluation to occur within 24-48 hours, in patient home   - Therapy evaluations in home within 24-48 hours of discharge; including DME and home safety   - Frontload therapy 5 days, then 3x a week   - Therapy to evaluate if patient has [address] needs for personal care   -  evaluation within 24-48 hours, includes evaluation of resources and insurance to determine [place], [place], LTC, and Medicaid options      PT Equipment Recommendations  Equipment Needed: No    Patient Diagnosis(es): The encounter diagnosis was Pneumonia of left lung due to infectious organism, unspecified part of lung. Pt hx: Adm  with word finding difficulty. Head CT negative for acute process. Recent COVID dx.  CXR - Patchy bilateral upper lobe airspace disease suspicious for multifocal pneumonia    Assessment   Assessment: pt tolerated session well continuing to demo confusion and requires cues
Physician Progress Note      PATIENT:               Renee King  CSN #:                  [id number]  :                       1940  ADMIT DATE:       2023 4:15 PM  [facility] DATE:  RESPONDING  PROVIDER #:        Goldy Lara MD          QUERY TEXT:    Patient admitted with Covid pneumonia. Noted documentation of acute   respiratory failure in the H&P. In order to support the diagnosis of acute   respiratory failure, please include additional clinical indicators in your   documentation. Or please document if the diagnosis of acute respiratory   failure has been ruled out after further study. The medical record reflects the following:  Risk Factors: 78 yo w/ Covid pneumonia  Clinical Indicators: Per H&P:Acute hypoxemic respiratory failure. Clear to   auscultation, bilaterally without Rales/Wheezes/Rhonchi. Per ED: Pulmonary   effort is normal.  Sats 90-96%. Treatment: 1-2L oxygen    Acute Respiratory Failure Clinical Indicators per [employer] MS-DRG Training Guide and   Quick Reference Guide:  pO2 < 60 mmHg or SpO2 (pulse oximetry) < 91% breathing room air  pCO2 > 50 and pH < 7.35  P/F ratio (pO2 / FIO2) < 300  pO2 decrease or pCO2 increase by 10 mmHg from baseline (if known)  Supplemental oxygen of 40% or more  Presence of respiratory distress, tachypnea, dyspnea, shortness of breath,   wheezing  Unable to speak in complete sentences  Use of accessory muscles to breathe  Extreme anxiety and feeling of impending doom  Tripod position  Confusion/altered mental status/obtunded  Options provided:  -- Acute Respiratory Failure as evidenced by, Please document evidence. -- Acute Respiratory Failure ruled out after study  -- Other - I will add my own diagnosis  -- Disagree - Not applicable / Not valid  -- Disagree - Clinically unable to determine / Unknown  -- Refer to Clinical Documentation Reviewer    PROVIDER RESPONSE TEXT:    Acute Respiratory Failure has been ruled out after study.     Query created by:
Pt admitted from ED to room 3517. Admission, assessment and vitals completed. Family in room to assist with admission as pt admitted for AMS and unable to complete admission. Unit schedule and plan of care explained to patient and family at bedside. Pt is a high fall risk, all belongings are within reach, bed alarm activated, non-skid socks in place and avasys camera in place for safety d/t impulsiveness and poor safety awareness. 4 Eyes Skin Assessment     NAME:  Matt Cousin OF BIRTH:  1940  MEDICAL RECORD NUMBER:  [id number]    The patient is being assessed for  Admission    I agree that at least one RN has performed a thorough Head to Toe Skin Assessment on the patient. ALL assessment sites listed below have been assessed. Areas assessed by both nurses:    Head, Face, Ears, Shoulders, Back, Chest, Arms, Elbows, Hands, Sacrum. Buttock, Coccyx, Ischium, and Legs. Feet and Heels        Does the Patient have a Wound?  No noted wound(s)       Sushant Prevention initiated by RN: No  Wound Care Orders initiated by RN: No    Pressure Injury (Stage 3,4, Unstageable, DTI, NWPT, and Complex wounds) if present, place Wound referral order by RN under : No    New Ostomies, if present place, Ostomy referral order under : No     Nurse 1 eSignature: Electronically signed by Jose Wilson RN on 11/13/23 at 4:19 AM EST    **SHARE this note so that the co-signing nurse can place an eSignature**    Nurse 2 eSignature: {Esignature:[id number]}
Pt oriented to person as of 0000. Pt disoriented to own  as of 0330. [facility] neurocritical notified. No new orders at this time.
Reviewed discharge instructions with patient's spouse. Reviewed discharge medications including dosing, schedule, indication, and adverse reactions. Reviewed which medications were already taken today and next dosage due for each medication. Reviewed signs and symptoms that prompt a call to the physician and appropriate phone numbers. No follow up appointment that have been made yet. Wife verbalized understanding of all instructions and questions were answered to her. satisfaction. Signed discharge instructions were given to the patient and a copy placed in the paper-lite chart. Patient discharged to SNF per stretcher with family members.       Michael Boateng RN
V2.0    [facility] [place]      Name:  Benjamin Giron /Age/Sex: 1940  (79 y.o. male)   MRN & CSN:  [id number] & [id number] Encounter Date/Time: 2023 9:15 AM EST   Location:  [facility]/3517-01 PCP: Lonnie Gauthier MD     Attending:Yumiko Garg MD       Hospital Day: 3    Assessment and Recommendations   Benjamin Giron is a 79 y.o. male with pmh of B-cell lymphoma in remission, pulmonary embolism with IVC who presented to the emergency department for altered mental status on . Per the ER note the patient woke up around 2 PM on  with word finding difficulty and was unable to recognize his family. Last known normal was 10:30 AM.    CT scan of the head without contrast and CTA of the head and neck were performed and a code stroke was called. No acute findings on CTA and CT. The patient did have a new oxygen requirement. CTA of the head and neck did demonstrate likely pneumonia in the left lung. Patient was started on antibiotics and admitted. Plan:   Acute hypoxemic respiratory failure secondary to Covid pneumonia-azithromycin and IV dexamethasone  Dysarthria and confusion- MRI of brain and neuro consult pending  B- cell lymphoma in remission  H/O PE and IVC filter- continue xarelto       Diet ADULT DIET; Regular; Low Fat/Low Chol/High Fiber/2 gm Na   DVT Prophylaxis [] Lovenox, []  Heparin, [] SCDs, [] Ambulation,  [] Eliquis, [x] Xarelto  [] Coumadin   Code Status Full Code   Disposition From: Home  Expected Disposition: TBD  Estimated Date of Discharge: TBD  Patient requires continued admission due to altered mental status   Surrogate Decision Maker/ POA  Per EMR     Personally reviewed Lab Studies and Imaging   2023  CBC- hemoglobin 13.0  BMP- glucose 231, BUN 27          Subjective:     Chief Complaint: Altered mental status    Benjamin Giron is a 79 y.o. male who presents with a past medical history as detailed above.      On 2023 patient is
V2.0    [facility] [place] Progress Note      Name:  Que Trinidad /Age/Sex: 1940  (79 y.o. male)   MRN & CSN:  [id number] & [id number] Encounter Date/Time: 2023 9:15 AM EST   Location:  [facility]/3517-01 PCP: Jestine Duverney, MD     Attending:Isidro Garg MD       Hospital Day: 6    Assessment and Recommendations   Que Trinidad is a 79 y.o. male with pmh of B-cell lymphoma in remission, pulmonary embolism with IVC who presented to the emergency department for altered mental status on . Per the ER note the patient woke up around 2 PM on  with word finding difficulty and was unable to recognize his family. Last known normal was 10:30 AM.    CT scan of the head without contrast and CTA of the head and neck were performed and a code stroke was called. No acute findings on CTA and CT. The patient did have a new oxygen requirement. CTA of the head and neck did demonstrate likely pneumonia in the left lung. Patient was started on antibiotics and admitted. Plan:   Covid pneumonia-azithromycin and IV dexamethasone  Dysarthria and confusion- MRI of brain demonstrated no CVA. Neurology was consulted and recommended treatment for metabolic encephalopathy. B- cell lymphoma in remission  H/O PE and IVC filter- continue xarelto   Acute respiratory failure- Patient now meets criteria for acute respiratory failure as he was noted by the physical therapist to have SPO2 as low as 87% on room air      Diet ADULT DIET;  Regular; Low Fat/Low Chol/High Fiber/2 gm Na   DVT Prophylaxis [] Lovenox, []  Heparin, [] SCDs, [] Ambulation,  [] Eliquis, [x] Xarelto  [] Coumadin   Code Status Full Code   Disposition From: Home  Expected Disposition: TBD  Estimated Date of Discharge: TBD  Patient requires continued admission due to altered mental status   Surrogate Decision Maker/ POA  Per EMR     Personally reviewed Lab Studies and Imaging   2023  CBC- No significant abnormality  BMP- glucose 271,
V2.0    [facility] Progress Note      Name:  Neto Swanson /Age/Sex: 1940  (79 y.o. male)   MRN & CSN:  [id number] & [id number] Encounter Date/Time: 2023 9:15 AM EST   [facility]  [facility]- PCP: Duarte Richards MD     Attending:Satinder Crews MD       Hospital Day: 2    Assessment and Recommendations   Neto Swanson is a 79 y.o. male with pmh of B-cell lymphoma in remission, pulmonary embolism with IVC who presented to the emergency department for altered mental status on . Per the ER note the patient woke up around 2 PM on  with word finding difficulty and was unable to recognize his family. Last known normal was 10:30 AM.    CT scan of the head without contrast and CTA of the head and neck were performed and a code stroke was called. No acute findings on CTA and CT. The patient did have a new oxygen requirement. CTA of the head and neck did demonstrate likely pneumonia in the left lung. Patient was started on antibiotics and admitted. Plan:   Acute hypoxemic respiratory failure secondary to Covid pneumonia-azithromycin and IV dexamethasone  Dysarthria and confusion- MRI of brain and neuro consult pending  B- cell lymphoma in remission  H/O PE and IVC filter- continue xarelto       Diet ADULT DIET; Regular; Low Fat/Low Chol/High Fiber/2 gm Na   DVT Prophylaxis [] Lovenox, []  Heparin, [] SCDs, [] Ambulation,  [] Eliquis, [x] Xarelto  [] Coumadin   Code Status Full Code   Disposition From: Home  Expected Disposition: TBD  Estimated Date of Discharge: TBD  Patient requires continued admission due to altered mental status   Surrogate Decision Maker/ POA  Per EMR     Personally reviewed Lab Studies and Imaging   2023  CRP- 94.2  CBC- hemoglobin 13.1  BMP- NA-132, glucose 192    Imaging that was interpreted personally includes chest xray and results demonstrated left mid and basilar pneumonia.       Subjective:     Chief Complaint: Altered mental
V2.0    [facility] [place]      Name:  Olaf Norman /Age/Sex: 1940  (79 y.o. male)   MRN & CSN:  [id number] & [id number] Encounter Date/Time: 2023 9:15 AM EST   Location:  [facility]/3517-01 PCP: Liang Narayan MD     Attending:Beatriz Garg MD       Hospital Day: 7    Assessment and Recommendations   Olaf Norman is a 79 y.o. male with pmh of B-cell lymphoma in remission, pulmonary embolism with IVC who presented to the emergency department for altered mental status on . Per the ER note the patient woke up around 2 PM on  with word finding difficulty and was unable to recognize his family. Last known normal was 10:30 AM.    CT scan of the head without contrast and CTA of the head and neck were performed and a code stroke was called. No acute findings on CTA and CT. The patient did have a new oxygen requirement. CTA of the head and neck did demonstrate likely pneumonia in the left lung. Patient was started on antibiotics and admitted. Plan:   Covid pneumonia-azithromycin and IV dexamethasone  Dysarthria and confusion- MRI of brain and neuro consult pending  B- cell lymphoma in remission  H/O PE and IVC filter- continue xarelto   Acute respiratory failure- Patient now meets criteria for acute respiratory failure as he was noted by the physical therapist to have SPO2 as low as 87% on room air      Diet ADULT DIET;  Regular; Low Fat/Low Chol/High Fiber/2 gm Na   DVT Prophylaxis [] Lovenox, []  Heparin, [] SCDs, [] Ambulation,  [] Eliquis, [x] Xarelto  [] Coumadin   Code Status Full Code   Disposition From: Home  Expected Disposition: home  Estimated Date of Discharge:   Patient requires continued admission due to altered mental status   Surrogate Decision Maker/ POA  Per EMR     Personally reviewed Lab Studies and Imaging   2023  CBC- No significant abnormality  BMP- sodium-131, glucose 254, bun 26, creatinine 0.9            Subjective:     Chief
V2.0    [facility] [place] Progress Note      Name:  Yanci Spencer /Age/Sex: 1940  (79 y.o. male)   MRN & CSN:  [id number] & [id number] Encounter Date/Time: 11/15/2023 9:15 AM EST   Location:  [facility]/3517-01 PCP: Snidy Alvarado MD     Attending:Suad Garg MD       Hospital Day: 4    Assessment and Recommendations   Yanci Spencer is a 79 y.o. male with pmh of B-cell lymphoma in remission, pulmonary embolism with IVC who presented to the emergency department for altered mental status on . Per the ER note the patient woke up around 2 PM on  with word finding difficulty and was unable to recognize his family. Last known normal was 10:30 AM.    CT scan of the head without contrast and CTA of the head and neck were performed and a code stroke was called. No acute findings on CTA and CT. The patient did have a new oxygen requirement. CTA of the head and neck did demonstrate likely pneumonia in the left lung. Patient was started on antibiotics and admitted. Plan:   Covid pneumonia-azithromycin and IV dexamethasone  Dysarthria and confusion- MRI of brain and neuro consult pending  B- cell lymphoma in remission  H/O PE and IVC filter- continue xarelto       Diet ADULT DIET; Regular; Low Fat/Low Chol/High Fiber/2 gm Na   DVT Prophylaxis [] Lovenox, []  Heparin, [] SCDs, [] Ambulation,  [] Eliquis, [x] Xarelto  [] Coumadin   Code Status Full Code   Disposition From: Home  Expected Disposition: TBD  Estimated Date of Discharge: TBD  Patient requires continued admission due to altered mental status   Surrogate Decision Maker/ POA  Per EMR     Personally reviewed Lab Studies and Imaging   11/15/2023  CBC- hemoglobin 12.6  BMP- glucose 268, BUN 29  CRP- 36.0 (down from 114.1)          Subjective:     Chief Complaint: Altered mental status    Yanci Spencer is a 79 y.o. male who presents with a past medical history as detailed above. On 11/15/2023 patient is resting comfortably.
but unable to name.)  Orientation Level: Oriented to person;Disoriented to place; Disoriented to time;Disoriented to situation  Cognition  Overall Cognitive Status: Exceptions  Arousal/Alertness: Delayed responses to stimuli  Following Commands: Follows one step commands with increased time  Attention Span: Difficulty attending to directions  Memory: Decreased recall of biographical Information;Decreased recall of recent events;Decreased short term memory;Decreased recall of precautions  Safety Judgement: Decreased awareness of need for safety  Problem Solving: Decreased awareness of errors  Insights: Decreased awareness of deficits  Initiation: Requires cues for some  Sequencing: Requires cues for some  Cognition Comment: delayed processing, confused     Objective   Pulse: 59  Heart Rate Source: Telemetry  BP: 118/69  BP Location: Right upper arm  BP Method: Automatic  Patient Position: Lying left side  MAP (Calculated): 85  Respirations: 16  SpO2: 94 %  O2 Device: Nasal cannula              AROM RLE (degrees)  RLE AROM: WFL  AROM LLE (degrees)  LLE AROM : WFL  Strength RLE  Strength RLE: WFL  Strength LLE  Strength LLE: WFL           Bed mobility  Supine to Sit: Stand by assistance (HOB flat, no use of side rail)  Scooting: Stand by assistance  Transfers  Sit to Stand: Contact guard assistance (VC for hand placement)  Stand to Sit: Contact guard assistance (vc for hand placement)  Ambulation  Surface: Level tile  Device: No Device  Assistance: Contact guard assistance;Stand by assistance  Quality of Gait: initial CG progressing to SBA in the room. Pt initially demonstrated wide JUDY, quickly normalized. , normal germain.   Distance: 80 ft in room     Balance  Comments: good stability with transfers and gait  Pt stood at the since with CG to wash up and brush teeth        AM-PAC Score  AM-PAC Inpatient Mobility Raw Score : 18 (11/13/23 1035)  AM-PAC Inpatient T-Scale Score : 43.63 (11/13/23 1035)  Mobility Inpatient
PM    BILIRUBINUR Negative 11/12/2023 05:56 PM    BLOODU SMALL 11/12/2023 05:56 PM    GLUCOSEU Negative 11/12/2023 05:56 PM    KETUA 15 11/12/2023 05:56 PM     Urine Cultures:   Lab Results   Component Value Date/Time    LABURIN No growth at 18 to 36 hours 08/19/2020 03:55 AM     Blood Cultures:   Lab Results   Component Value Date/Time    [facility]  11/12/2023 11:32 PM     No Growth to date. Any change in status will be called. Lab Results   Component Value Date/Time    BLOODCULT2  11/12/2023 11:32 PM     No Growth to date. Any change in status will be called.      Organism:   Lab Results   Component Value Date/Time    ORG Haemophilus parainfluenzae 08/19/2020 05:50 AM    ORG Candida albicans 08/19/2020 05:50 AM         Electronically signed by CHIKIS Barboza CNP on 11/16/2023 at 10:19 AM

## 2023-11-20 NOTE — CARE COORDINATION
ADDEDNUM:  2:17 PM    Transport is backed up and will not be able to  pt between 3:15pm -330pm.     Electronically signed by BAO Huber LSW on 11/20/2023 at 2:17 PM           Case Management Assessment            Discharge Note                    Date / Time of Note: 11/20/2023 12:13 PM                  Discharge Note Completed by: BAO Huber LSW    Patient Name: Ina Toribio   YOB: 1940  Diagnosis: Pneumonia of left lung due to infectious organism, unspecified part of lung [J18.9]  Acute hypoxemic respiratory failure ([address]) [J96.01]   Date / Time: 11/12/2023  4:15 PM    Current PCP: Patricia Marroquin MD  Clinic patient: No    Hospitalization in the last 30 days: No       Advance Directives:  Code Status: Full Code  [place] DNR form completed and on chart: No    Financial:  Payor: Margaux Anna / Plan: MEDICARE PART A AND B / Product Type: *No Product type* /      Pharmacy:    [facility] [address] [place] [address], [address] [place] [address]  Phone: [phone] Fax: [phone]      Assistance purchasing medications?: Potential Assistance Purchasing Medications: No  Assistance provided by Case Management: None at this time    Does patient want to participate in local refill/ meds to beds program?:      Meds To Beds General Rules:  1. Can ONLY be done Monday- Friday between 8:30am-5pm  2. Prescription(s) must be in pharmacy by 3pm to be filled same day  3. Copy of patient's insurance/ prescription drug card and patient face sheet must be sent along with the prescription(s)  4. Cost of Rx cannot be added to hospital bill. If financial assistance is needed, please contact unit  or ;  or  CANNOT provide pharmacy voucher for patients co-pays  5.  Patients can then  the prescription on their way out of the hospital at discharge, or pharmacy can deliver

## 2023-11-20 NOTE — CARE COORDINATION
ADDENDUM:  9:23 AM    SW spoke to Pt's daughter and provided accepting facilities. Their choice is [facility]. Pt's daughter an wife will be at the hospital soon. SW scheduled transport through [facility] with [facility], at 230pm. SW updated pt's daughter. SW notified [facility] admission. DC note to follow. Electronically signed by BAO Vasquez LSW on 11/20/2023 at 9:25 AM      8:56 AM    [facility] can accept pt. [facility] also can accept pt. Electronically signed by BAO Vasquez LSW on 11/20/2023 at 8:56 AM      8:44 AM      [facility] able to accept. JESSICA spoke to [facility] admissions they are reviewing and will call back. JESSICA spoke to [facility] admissions, they are reviewing and will call back. Sw following.   Electronically signed by BAO Vasquez LSW on 11/20/2023 at 8:46 AM  [phone]

## 2023-12-03 ENCOUNTER — APPOINTMENT (OUTPATIENT)
Dept: CT IMAGING | Age: 83
DRG: 291 | End: 2023-12-03
Payer: MEDICARE

## 2023-12-03 ENCOUNTER — HOSPITAL ENCOUNTER (INPATIENT)
Age: 83
LOS: 8 days | Discharge: HOME HEALTH | DRG: 291 | End: 2023-12-11
Attending: EMERGENCY MEDICINE | Admitting: INTERNAL MEDICINE
Payer: MEDICARE

## 2023-12-03 ENCOUNTER — APPOINTMENT (OUTPATIENT)
Dept: GENERAL RADIOLOGY | Age: 83
DRG: 291 | End: 2023-12-03
Payer: MEDICARE

## 2023-12-03 DIAGNOSIS — I50.9 ACUTE HEART FAILURE, UNSPECIFIED HEART FAILURE TYPE (HCC): ICD-10-CM

## 2023-12-03 DIAGNOSIS — R41.82 ALTERED MENTAL STATUS, UNSPECIFIED ALTERED MENTAL STATUS TYPE: ICD-10-CM

## 2023-12-03 DIAGNOSIS — J81.0 ACUTE PULMONARY EDEMA (HCC): Primary | ICD-10-CM

## 2023-12-03 LAB
ANION GAP SERPL CALCULATED.3IONS-SCNC: 12 MMOL/L (ref 3–16)
BASE EXCESS BLDV CALC-SCNC: 3.5 MMOL/L (ref -2–3)
BASOPHILS # BLD: 0 K/UL (ref 0–0.2)
BASOPHILS NFR BLD: 0 %
BILIRUB UR QL STRIP.AUTO: NEGATIVE
BUN SERPL-MCNC: 18 MG/DL (ref 7–20)
CALCIUM SERPL-MCNC: 8.3 MG/DL (ref 8.3–10.6)
CHLORIDE SERPL-SCNC: 92 MMOL/L (ref 99–110)
CLARITY UR: CLEAR
CO2 BLDV-SCNC: 30 MMOL/L
CO2 SERPL-SCNC: 26 MMOL/L (ref 21–32)
COHGB MFR BLDV: 1.3 % (ref 0–1.5)
COLOR UR: YELLOW
CREAT SERPL-MCNC: 0.9 MG/DL (ref 0.8–1.3)
DEPRECATED RDW RBC AUTO: 13.3 % (ref 12.4–15.4)
DO-HGB MFR BLDV: 45.2 %
EKG ATRIAL RATE: 90 BPM
EKG DIAGNOSIS: NORMAL
EKG P AXIS: 57 DEGREES
EKG P-R INTERVAL: 138 MS
EKG Q-T INTERVAL: 392 MS
EKG QRS DURATION: 102 MS
EKG QTC CALCULATION (BAZETT): 479 MS
EKG R AXIS: -51 DEGREES
EKG T AXIS: -11 DEGREES
EKG VENTRICULAR RATE: 90 BPM
EOSINOPHIL # BLD: 0.1 K/UL (ref 0–0.6)
EOSINOPHIL NFR BLD: 2 %
GFR SERPLBLD CREATININE-BSD FMLA CKD-EPI: >60 ML/MIN/{1.73_M2}
GLUCOSE SERPL-MCNC: 228 MG/DL (ref 70–99)
GLUCOSE UR STRIP.AUTO-MCNC: NEGATIVE MG/DL
HCO3 BLDV-SCNC: 28.6 MMOL/L (ref 24–28)
HCT VFR BLD AUTO: 34.9 % (ref 40.5–52.5)
HGB BLD-MCNC: 12 G/DL (ref 13.5–17.5)
HGB UR QL STRIP.AUTO: NEGATIVE
KETONES UR STRIP.AUTO-MCNC: NEGATIVE MG/DL
LEUKOCYTE ESTERASE UR QL STRIP.AUTO: NEGATIVE
LYMPHOCYTES # BLD: 0.4 K/UL (ref 1–5.1)
LYMPHOCYTES NFR BLD: 8 %
MCH RBC QN AUTO: 32.1 PG (ref 26–34)
MCHC RBC AUTO-ENTMCNC: 34.5 G/DL (ref 31–36)
MCV RBC AUTO: 93.2 FL (ref 80–100)
METHGB MFR BLDV: <0 % (ref 0–1.5)
MONOCYTES # BLD: 0.4 K/UL (ref 0–1.3)
MONOCYTES NFR BLD: 7 %
NEUTROPHILS # BLD: 4.2 K/UL (ref 1.7–7.7)
NEUTROPHILS NFR BLD: 83 %
NITRITE UR QL STRIP.AUTO: NEGATIVE
NT-PROBNP SERPL-MCNC: 578 PG/ML (ref 0–449)
OVALOCYTES BLD QL SMEAR: ABNORMAL
PCO2 BLDV: 44.7 MMHG (ref 41–51)
PH BLDV: 7.42 [PH] (ref 7.35–7.45)
PH UR STRIP.AUTO: 6 [PH] (ref 5–8)
PLATELET # BLD AUTO: 201 K/UL (ref 135–450)
PMV BLD AUTO: 8.5 FL (ref 5–10.5)
PO2 BLDV: <30 MMHG (ref 25–40)
POLYCHROMASIA BLD QL SMEAR: ABNORMAL
POTASSIUM SERPL-SCNC: 4.1 MMOL/L (ref 3.5–5.1)
PROCALCITONIN SERPL IA-MCNC: 0.12 NG/ML (ref 0–0.15)
PROT UR STRIP.AUTO-MCNC: NEGATIVE MG/DL
RBC # BLD AUTO: 3.74 M/UL (ref 4.2–5.9)
SAO2 % BLDV: 54 %
SODIUM SERPL-SCNC: 130 MMOL/L (ref 136–145)
SP GR UR STRIP.AUTO: 1.01 (ref 1–1.03)
TROPONIN, HIGH SENSITIVITY: 37 NG/L (ref 0–22)
TROPONIN, HIGH SENSITIVITY: 39 NG/L (ref 0–22)
TSH SERPL DL<=0.005 MIU/L-ACNC: 0.68 UIU/ML (ref 0.27–4.2)
UA COMPLETE W REFLEX CULTURE PNL UR: NORMAL
UA DIPSTICK W REFLEX MICRO PNL UR: NORMAL
URN SPEC COLLECT METH UR: NORMAL
UROBILINOGEN UR STRIP-ACNC: 0.2 E.U./DL
WBC # BLD AUTO: 5 K/UL (ref 4–11)

## 2023-12-03 PROCEDURE — 99291 CRITICAL CARE FIRST HOUR: CPT | Performed by: INTERNAL MEDICINE

## 2023-12-03 PROCEDURE — 99285 EMERGENCY DEPT VISIT HI MDM: CPT

## 2023-12-03 PROCEDURE — 70450 CT HEAD/BRAIN W/O DYE: CPT

## 2023-12-03 PROCEDURE — 83880 ASSAY OF NATRIURETIC PEPTIDE: CPT

## 2023-12-03 PROCEDURE — 1200000000 HC SEMI PRIVATE

## 2023-12-03 PROCEDURE — 84484 ASSAY OF TROPONIN QUANT: CPT

## 2023-12-03 PROCEDURE — 80048 BASIC METABOLIC PNL TOTAL CA: CPT

## 2023-12-03 PROCEDURE — 96374 THER/PROPH/DIAG INJ IV PUSH: CPT

## 2023-12-03 PROCEDURE — 6370000000 HC RX 637 (ALT 250 FOR IP): Performed by: INTERNAL MEDICINE

## 2023-12-03 PROCEDURE — 2580000003 HC RX 258: Performed by: INTERNAL MEDICINE

## 2023-12-03 PROCEDURE — 6360000002 HC RX W HCPCS: Performed by: EMERGENCY MEDICINE

## 2023-12-03 PROCEDURE — 82803 BLOOD GASES ANY COMBINATION: CPT

## 2023-12-03 PROCEDURE — 84443 ASSAY THYROID STIM HORMONE: CPT

## 2023-12-03 PROCEDURE — 93005 ELECTROCARDIOGRAM TRACING: CPT | Performed by: EMERGENCY MEDICINE

## 2023-12-03 PROCEDURE — 6360000004 HC RX CONTRAST MEDICATION: Performed by: INTERNAL MEDICINE

## 2023-12-03 PROCEDURE — 81003 URINALYSIS AUTO W/O SCOPE: CPT

## 2023-12-03 PROCEDURE — 71275 CT ANGIOGRAPHY CHEST: CPT

## 2023-12-03 PROCEDURE — 71045 X-RAY EXAM CHEST 1 VIEW: CPT

## 2023-12-03 PROCEDURE — 85025 COMPLETE CBC W/AUTO DIFF WBC: CPT

## 2023-12-03 PROCEDURE — 84145 PROCALCITONIN (PCT): CPT

## 2023-12-03 PROCEDURE — 6360000002 HC RX W HCPCS: Performed by: INTERNAL MEDICINE

## 2023-12-03 RX ORDER — FUROSEMIDE 10 MG/ML
40 INJECTION INTRAMUSCULAR; INTRAVENOUS 3 TIMES DAILY
Status: DISCONTINUED | OUTPATIENT
Start: 2023-12-03 | End: 2023-12-03

## 2023-12-03 RX ORDER — SODIUM CHLORIDE 9 MG/ML
INJECTION, SOLUTION INTRAVENOUS PRN
Status: DISCONTINUED | OUTPATIENT
Start: 2023-12-03 | End: 2023-12-11 | Stop reason: HOSPADM

## 2023-12-03 RX ORDER — CARVEDILOL 3.12 MG/1
3.12 TABLET ORAL 2 TIMES DAILY WITH MEALS
Status: DISCONTINUED | OUTPATIENT
Start: 2023-12-03 | End: 2023-12-03

## 2023-12-03 RX ORDER — ALBUTEROL SULFATE 2.5 MG/3ML
2.5 SOLUTION RESPIRATORY (INHALATION) EVERY 6 HOURS PRN
Status: DISCONTINUED | OUTPATIENT
Start: 2023-12-03 | End: 2023-12-11 | Stop reason: HOSPADM

## 2023-12-03 RX ORDER — M-VIT,TX,IRON,MINS/CALC/FOLIC 27MG-0.4MG
1 TABLET ORAL DAILY
Status: DISCONTINUED | OUTPATIENT
Start: 2023-12-03 | End: 2023-12-11 | Stop reason: HOSPADM

## 2023-12-03 RX ORDER — MAGNESIUM SULFATE IN WATER 40 MG/ML
2000 INJECTION, SOLUTION INTRAVENOUS PRN
Status: DISCONTINUED | OUTPATIENT
Start: 2023-12-03 | End: 2023-12-11 | Stop reason: HOSPADM

## 2023-12-03 RX ORDER — ACETAMINOPHEN 650 MG/1
650 SUPPOSITORY RECTAL EVERY 6 HOURS PRN
Status: DISCONTINUED | OUTPATIENT
Start: 2023-12-03 | End: 2023-12-11 | Stop reason: HOSPADM

## 2023-12-03 RX ORDER — FUROSEMIDE 10 MG/ML
40 INJECTION INTRAMUSCULAR; INTRAVENOUS 2 TIMES DAILY
Status: DISCONTINUED | OUTPATIENT
Start: 2023-12-03 | End: 2023-12-03

## 2023-12-03 RX ORDER — ONDANSETRON 2 MG/ML
4 INJECTION INTRAMUSCULAR; INTRAVENOUS EVERY 6 HOURS PRN
Status: DISCONTINUED | OUTPATIENT
Start: 2023-12-03 | End: 2023-12-11 | Stop reason: HOSPADM

## 2023-12-03 RX ORDER — ALBUTEROL SULFATE 2.5 MG/3ML
2.5 SOLUTION RESPIRATORY (INHALATION) EVERY 6 HOURS
Status: DISCONTINUED | OUTPATIENT
Start: 2023-12-03 | End: 2023-12-03 | Stop reason: SDUPTHER

## 2023-12-03 RX ORDER — SODIUM CHLORIDE 0.9 % (FLUSH) 0.9 %
5-40 SYRINGE (ML) INJECTION EVERY 12 HOURS SCHEDULED
Status: DISCONTINUED | OUTPATIENT
Start: 2023-12-03 | End: 2023-12-11 | Stop reason: HOSPADM

## 2023-12-03 RX ORDER — MAGNESIUM HYDROXIDE/ALUMINUM HYDROXICE/SIMETHICONE 120; 1200; 1200 MG/30ML; MG/30ML; MG/30ML
30 SUSPENSION ORAL EVERY 6 HOURS PRN
Status: DISCONTINUED | OUTPATIENT
Start: 2023-12-03 | End: 2023-12-10

## 2023-12-03 RX ORDER — SODIUM CHLORIDE 0.9 % (FLUSH) 0.9 %
5-40 SYRINGE (ML) INJECTION PRN
Status: DISCONTINUED | OUTPATIENT
Start: 2023-12-03 | End: 2023-12-11 | Stop reason: HOSPADM

## 2023-12-03 RX ORDER — ASCORBIC ACID 500 MG
500 TABLET ORAL DAILY
Status: DISCONTINUED | OUTPATIENT
Start: 2023-12-03 | End: 2023-12-11 | Stop reason: HOSPADM

## 2023-12-03 RX ORDER — SPIRONOLACTONE 25 MG/1
25 TABLET ORAL DAILY
Status: DISCONTINUED | OUTPATIENT
Start: 2023-12-03 | End: 2023-12-08

## 2023-12-03 RX ORDER — LISINOPRIL 5 MG/1
5 TABLET ORAL DAILY
Status: DISCONTINUED | OUTPATIENT
Start: 2023-12-03 | End: 2023-12-03

## 2023-12-03 RX ORDER — POTASSIUM CHLORIDE 7.45 MG/ML
10 INJECTION INTRAVENOUS PRN
Status: DISCONTINUED | OUTPATIENT
Start: 2023-12-03 | End: 2023-12-11 | Stop reason: HOSPADM

## 2023-12-03 RX ORDER — POLYETHYLENE GLYCOL 3350 17 G/17G
17 POWDER, FOR SOLUTION ORAL DAILY PRN
Status: DISCONTINUED | OUTPATIENT
Start: 2023-12-03 | End: 2023-12-11 | Stop reason: HOSPADM

## 2023-12-03 RX ORDER — POTASSIUM CHLORIDE 20 MEQ/1
40 TABLET, EXTENDED RELEASE ORAL PRN
Status: DISCONTINUED | OUTPATIENT
Start: 2023-12-03 | End: 2023-12-11 | Stop reason: HOSPADM

## 2023-12-03 RX ORDER — ACETAMINOPHEN 325 MG/1
650 TABLET ORAL EVERY 6 HOURS PRN
Status: DISCONTINUED | OUTPATIENT
Start: 2023-12-03 | End: 2023-12-11 | Stop reason: HOSPADM

## 2023-12-03 RX ORDER — FUROSEMIDE 10 MG/ML
40 INJECTION INTRAMUSCULAR; INTRAVENOUS ONCE
Status: COMPLETED | OUTPATIENT
Start: 2023-12-03 | End: 2023-12-03

## 2023-12-03 RX ORDER — ONDANSETRON 4 MG/1
4 TABLET, ORALLY DISINTEGRATING ORAL EVERY 8 HOURS PRN
Status: DISCONTINUED | OUTPATIENT
Start: 2023-12-03 | End: 2023-12-11 | Stop reason: HOSPADM

## 2023-12-03 RX ADMIN — SODIUM CHLORIDE, PRESERVATIVE FREE 10 ML: 5 INJECTION INTRAVENOUS at 12:18

## 2023-12-03 RX ADMIN — RIVAROXABAN 20 MG: 20 TABLET, FILM COATED ORAL at 17:38

## 2023-12-03 RX ADMIN — SODIUM CHLORIDE, PRESERVATIVE FREE 10 ML: 5 INJECTION INTRAVENOUS at 20:24

## 2023-12-03 RX ADMIN — SPIRONOLACTONE 25 MG: 25 TABLET ORAL at 17:38

## 2023-12-03 RX ADMIN — IOPAMIDOL 75 ML: 755 INJECTION, SOLUTION INTRAVENOUS at 08:08

## 2023-12-03 RX ADMIN — FUROSEMIDE 5 MG/HR: 10 INJECTION, SOLUTION INTRAMUSCULAR; INTRAVENOUS at 17:44

## 2023-12-03 RX ADMIN — FUROSEMIDE 40 MG: 10 INJECTION, SOLUTION INTRAMUSCULAR; INTRAVENOUS at 06:16

## 2023-12-03 RX ADMIN — CHLOROTHIAZIDE SODIUM 250 MG: 500 INJECTION, POWDER, LYOPHILIZED, FOR SOLUTION INTRAVENOUS at 22:35

## 2023-12-03 ASSESSMENT — PAIN - FUNCTIONAL ASSESSMENT: PAIN_FUNCTIONAL_ASSESSMENT: NONE - DENIES PAIN

## 2023-12-03 ASSESSMENT — PAIN SCALES - GENERAL
PAINLEVEL_OUTOF10: 0

## 2023-12-03 NOTE — CONSULTS
Cardiology Consultation                                                                    Pt Name: Benjamin Giron  Age: 79 y.o. Sex: male  : 1940  Location: [id number]/4430-81    Referring Physician: Sahara Celis MD  Primary cardiologist: Dr Bindu Ortiz, kevin      Reason for Consult:     Reason for Consultation/Chief Complaint: AHF    HPI:      Benjamin Giron is a 79 y.o. 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 Echo 2020: Normal LV, LVEF 60%, mild LVH, grade 1 diastolic dysfunction, normal RV and valves. Patient was brought to the emergency room on 12/3 due to altered mental status/confusion and hypoxia. CTA chest negative for PE however suggestive of pulmonary edema. ECG consistent with NSR with frequent PACs, poor R wave progression. Patient was placed on 5 L of supplemental oxygen. BP was normal.  Creatinine 0.9, proBNP 500, HS trop 37. Patient was admitted for acute heart failure. He was placed on IV Lasix 40 twice daily. Cardiology was consulted. Today increased oxygen requirements to 8 liters, no I/O or weights recorded. Histories     Past Medical History:   has a past medical history of Non Hodgkin's lymphoma ([address]). Surgical History:   has a past surgical history that includes lymph node biopsy and IR PORT PLACEMENT < 5 YEARS. Social History:   reports that he has never smoked. He has never used smokeless tobacco. He reports that he does not drink alcohol and does not use drugs. Family History:  No evidence for sudden cardiac death or premature CAD      Medications:       Home Medications  Were reviewed and are listed in nursing record. and/or listed below  Prior to Admission medications    Medication Sig Start Date End Date Taking?  Authorizing

## 2023-12-03 NOTE — H&P
V2.0  History and Physical      Name:  Chinyere Patel /Age/Sex: 1940  (79 y.o. male)   MRN & CSN:  [id number] & [id number] Encounter Date/Time: 12/3/2023 7:36 AM EST   [facility] PCP: Dariela Simon MD       Hospital Day: 1    Assessment and Plan:       Assessment/plan:    Acute hypoxic respiratory failure: At home patient was found to have sats in the 70s by squad and required nonrebreather at that time. Currently patient is back at baseline 3 L nasal cannula after receiving diuretic in the emergency department. 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  Given his recent hospitalization for COVID, new onset respiratory failure requiring 3 L oxygen despite a clear x-ray at that time, and now with signs concerning for CHF i.e. possible heart strain I am going to get a CTA chest to rule out PE. Pulmonary edema/elevated BNP:  Patient being evaluated for heart failure  He is discussed with ED provider admitting the patient to inpatient  Consult placed to cardiology  Echocardiogram ordered  Patient started on Lasix 40 mg IV twice daily  Strict I's and O's    Intermittent delirium versus new onset dementia:  I have reviewed patient's hospitalization with discharge date   Office visit 2023 at oncology found patient to have normal mental status with good insight at that time. At that time patient was admitted for COVID and increased confusion. Patient was discharged to skilled nursing facility still having intermittent confusion  CT head on admission reviewed by me shows no bleed or midline shift  We will check B12 folate and TSH  Monitor behavior    Hyponatremia:  Reviewed all records showed sodium to be 130 at time of discharge on . At time of admission today is 130. Possible fluid overload versus SIADH. Trend in a.m.     Elevated Troponin:  No CP  EKG: reviewed by me shows SR no acute ischemic changes seen  Suspect demand ischemia, given parainfluenzae 08/19/2020 05:50 AM    ORG Candida albicans 08/19/2020 05:50 AM       Imaging/Diagnostics Last 24 Hours   CT HEAD WO CONTRAST    Result Date: 12/3/2023  CT HEAD WITHOUT CONTRAST HISTORY: Altered mental status PROCEDURE: Noncontrast CT the brain performed with 5 mm contiguous sections. Individualized dose optimization technique was used in order to meet ALARA standards for radiation dose reduction. In addition to vendor specific dose reduction algorithms, the  dose reduction techniques vary based on the specific scanner utilized but frequently include automated exposure control, adjustment of the mA and/or kV according to patient size, and use of iterative reconstruction technique. COMPARISON: None FINDINGS: There is no acute hemorrhage or mass effect. The ventricles are normal in size and position. The grey-white matter differentiation is preserved. No calvarial abnormality is noted. The sinus are pneumatized. The globes and orbits are normal.  No additional abnormalities are seen in the structures of the skull base. No acute intracranial hemorrhage or mass effect. Electronically signed by Jody Carr DO    XR CHEST PORTABLE    Result Date: 12/3/2023  Portable chest HISTORY: Shortness of breath FINDINGS: Study is compared to November 12, 2023 study. Port catheter is noted. There has been interval development of extensive new bilateral airspace disease. No pneumothorax is seen. Heart size stable. Development of new bilateral airspace disease. Differential diagnosis includes alveolar pulmonary edema versus acute infection.  Electronically signed by Jody Carr DO        Electronically signed by Nicholette Mcardle, MD on 12/3/2023 at 7:36 AM

## 2023-12-03 NOTE — ED NOTES
ED TO INPATIENT SBAR HANDOFF    Patient Name: Leopoldo Chavarria   :  1940  79 y.o. MRN:  [id number]  Preferred Name  [address]  ED Room #:  G54/N54-55  Family/Caregiver Present yes   Restraints no   Sitter no   Sepsis Risk Score Sepsis Risk Score: 1.06    Situation  Code Status: Full Code No additional code details. Allergies: Glimepiride, Metformin, Sitagliptin, Acetaminophen, Diphenhydramine, and Statins  Weight: No data found. Arrived from: home  Chief Complaint:   Chief Complaint   Patient presents with    Altered Mental Status     BIBA for progressing altered mental status started about 4 weeks ago after he got covid , EMS states has multiple occasions of falls since then denies any head injury , On home 02 at 3LPM , Saturating 76% on scene was put on Non Rebreather at 15LPM by EMS. Hospital Problem/Diagnosis:  Principal Problem:    Acute heart failure, unspecified heart failure type ([address])  Resolved Problems:    * No resolved hospital problems. *    Imaging:   CT HEAD WO CONTRAST   Final Result      No acute intracranial hemorrhage or mass effect. Electronically signed by Selma Lebron DO      XR CHEST PORTABLE   Final Result      Development of new bilateral airspace disease. Differential diagnosis includes alveolar pulmonary edema versus acute infection.       Electronically signed by Selma Lebron DO      [address]    (Results Pending)     Abnormal labs:   Abnormal Labs Reviewed   CBC WITH AUTO DIFFERENTIAL - Abnormal; Notable for the following components:       Result Value    RBC 3.74 (*)     Hemoglobin 12.0 (*)     Hematocrit 34.9 (*)     Lymphocytes Absolute 0.4 (*)     Polychromasia Occasional (*)     Ovalocytes 1+ (*)     All other components within normal limits   BASIC METABOLIC PANEL - Abnormal; Notable for the following components:    Sodium 130 (*)     Chloride 92 (*)     Glucose 228 (*)     All other components within normal limits   TROPONIN - Abnormal; Notable

## 2023-12-04 ENCOUNTER — APPOINTMENT (OUTPATIENT)
Dept: GENERAL RADIOLOGY | Age: 83
DRG: 291 | End: 2023-12-04
Payer: MEDICARE

## 2023-12-04 LAB
ANION GAP SERPL CALCULATED.3IONS-SCNC: 13 MMOL/L (ref 3–16)
BASOPHILS # BLD: 0 K/UL (ref 0–0.2)
BASOPHILS NFR BLD: 0.3 %
BUN SERPL-MCNC: 17 MG/DL (ref 7–20)
CALCIUM SERPL-MCNC: 8.4 MG/DL (ref 8.3–10.6)
CHLORIDE SERPL-SCNC: 91 MMOL/L (ref 99–110)
CHOLEST SERPL-MCNC: 177 MG/DL (ref 0–199)
CO2 SERPL-SCNC: 28 MMOL/L (ref 21–32)
CREAT SERPL-MCNC: 1 MG/DL (ref 0.8–1.3)
DEPRECATED RDW RBC AUTO: 13.4 % (ref 12.4–15.4)
EOSINOPHIL # BLD: 0 K/UL (ref 0–0.6)
EOSINOPHIL NFR BLD: 0.6 %
GFR SERPLBLD CREATININE-BSD FMLA CKD-EPI: >60 ML/MIN/{1.73_M2}
GLUCOSE BLD-MCNC: 221 MG/DL (ref 70–99)
GLUCOSE BLD-MCNC: 225 MG/DL (ref 70–99)
GLUCOSE BLD-MCNC: 232 MG/DL (ref 70–99)
GLUCOSE SERPL-MCNC: 219 MG/DL (ref 70–99)
HCT VFR BLD AUTO: 35.1 % (ref 40.5–52.5)
HDLC SERPL-MCNC: 27 MG/DL (ref 40–60)
HGB BLD-MCNC: 12 G/DL (ref 13.5–17.5)
LDLC SERPL CALC-MCNC: 128 MG/DL
LYMPHOCYTES # BLD: 0.4 K/UL (ref 1–5.1)
LYMPHOCYTES NFR BLD: 10.4 %
MAGNESIUM SERPL-MCNC: 1.6 MG/DL (ref 1.8–2.4)
MCH RBC QN AUTO: 32.5 PG (ref 26–34)
MCHC RBC AUTO-ENTMCNC: 34.2 G/DL (ref 31–36)
MCV RBC AUTO: 95.1 FL (ref 80–100)
MONOCYTES # BLD: 0.3 K/UL (ref 0–1.3)
MONOCYTES NFR BLD: 7.2 %
NEUTROPHILS # BLD: 3.5 K/UL (ref 1.7–7.7)
NEUTROPHILS NFR BLD: 81.5 %
PERFORMED ON: ABNORMAL
PLATELET # BLD AUTO: 203 K/UL (ref 135–450)
PMV BLD AUTO: 8.7 FL (ref 5–10.5)
POTASSIUM SERPL-SCNC: 3.3 MMOL/L (ref 3.5–5.1)
RBC # BLD AUTO: 3.69 M/UL (ref 4.2–5.9)
RBC MORPH BLD: NORMAL
SODIUM SERPL-SCNC: 132 MMOL/L (ref 136–145)
TRIGL SERPL-MCNC: 112 MG/DL (ref 0–150)
VLDLC SERPL CALC-MCNC: 22 MG/DL
WBC # BLD AUTO: 4.3 K/UL (ref 4–11)

## 2023-12-04 PROCEDURE — 6370000000 HC RX 637 (ALT 250 FOR IP): Performed by: INTERNAL MEDICINE

## 2023-12-04 PROCEDURE — 1200000000 HC SEMI PRIVATE

## 2023-12-04 PROCEDURE — 85025 COMPLETE CBC W/AUTO DIFF WBC: CPT

## 2023-12-04 PROCEDURE — 2580000003 HC RX 258: Performed by: INTERNAL MEDICINE

## 2023-12-04 PROCEDURE — 92611 MOTION FLUOROSCOPY/SWALLOW: CPT

## 2023-12-04 PROCEDURE — 94761 N-INVAS EAR/PLS OXIMETRY MLT: CPT

## 2023-12-04 PROCEDURE — 83735 ASSAY OF MAGNESIUM: CPT

## 2023-12-04 PROCEDURE — 99221 1ST HOSP IP/OBS SF/LOW 40: CPT | Performed by: NURSE PRACTITIONER

## 2023-12-04 PROCEDURE — 36415 COLL VENOUS BLD VENIPUNCTURE: CPT

## 2023-12-04 PROCEDURE — 74230 X-RAY XM SWLNG FUNCJ C+: CPT

## 2023-12-04 PROCEDURE — 2700000000 HC OXYGEN THERAPY PER DAY

## 2023-12-04 PROCEDURE — 92610 EVALUATE SWALLOWING FUNCTION: CPT

## 2023-12-04 PROCEDURE — 80061 LIPID PANEL: CPT

## 2023-12-04 PROCEDURE — 80048 BASIC METABOLIC PNL TOTAL CA: CPT

## 2023-12-04 RX ORDER — GLUCAGON 1 MG/ML
1 KIT INJECTION PRN
Status: DISCONTINUED | OUTPATIENT
Start: 2023-12-04 | End: 2023-12-11 | Stop reason: HOSPADM

## 2023-12-04 RX ORDER — DEXTROSE MONOHYDRATE 100 MG/ML
INJECTION, SOLUTION INTRAVENOUS CONTINUOUS PRN
Status: DISCONTINUED | OUTPATIENT
Start: 2023-12-04 | End: 2023-12-11 | Stop reason: HOSPADM

## 2023-12-04 RX ORDER — INSULIN LISPRO 100 [IU]/ML
0-4 INJECTION, SOLUTION INTRAVENOUS; SUBCUTANEOUS
Status: DISCONTINUED | OUTPATIENT
Start: 2023-12-04 | End: 2023-12-11 | Stop reason: HOSPADM

## 2023-12-04 RX ORDER — INSULIN LISPRO 100 [IU]/ML
0-4 INJECTION, SOLUTION INTRAVENOUS; SUBCUTANEOUS NIGHTLY
Status: DISCONTINUED | OUTPATIENT
Start: 2023-12-04 | End: 2023-12-11 | Stop reason: HOSPADM

## 2023-12-04 RX ADMIN — SODIUM CHLORIDE, PRESERVATIVE FREE 10 ML: 5 INJECTION INTRAVENOUS at 22:00

## 2023-12-04 RX ADMIN — SODIUM CHLORIDE, PRESERVATIVE FREE 10 ML: 5 INJECTION INTRAVENOUS at 10:37

## 2023-12-04 RX ADMIN — POTASSIUM BICARBONATE 40 MEQ: 782 TABLET, EFFERVESCENT ORAL at 10:33

## 2023-12-04 RX ADMIN — RIVAROXABAN 20 MG: 20 TABLET, FILM COATED ORAL at 17:20

## 2023-12-04 RX ADMIN — INSULIN LISPRO 1 UNITS: 100 INJECTION, SOLUTION INTRAVENOUS; SUBCUTANEOUS at 17:20

## 2023-12-04 RX ADMIN — ACETAMINOPHEN 650 MG: 325 TABLET ORAL at 04:11

## 2023-12-04 RX ADMIN — INSULIN LISPRO 1 UNITS: 100 INJECTION, SOLUTION INTRAVENOUS; SUBCUTANEOUS at 12:52

## 2023-12-04 ASSESSMENT — ENCOUNTER SYMPTOMS
SHORTNESS OF BREATH: 1
GASTROINTESTINAL NEGATIVE: 1

## 2023-12-04 ASSESSMENT — PAIN SCALES - GENERAL
PAINLEVEL_OUTOF10: 0

## 2023-12-04 NOTE — OR NURSING
Temp now at 98.7 axillary. Tylenol effective.  Electronically signed by Ruddy Kyle RN on 12/4/2023 at 7:36 AM

## 2023-12-04 NOTE — PROGRESS NOTES
Cardiology Consult Service  Daily Progress Note        Admit Date:  12/3/2023  Primary cardiologist: Dr Kandy Lundy, new    Reason for Consultation/Chief Complaint: AHF    Subjective:     Tenisha Ochoa is a 79 y.o. 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 Echo 06/2020: Normal LV, LVEF 60%, mild LVH, grade 1 diastolic dysfunction, normal RV and valves. Patient was brought to the emergency room on 12/3 due to altered mental status/confusion and hypoxia. CTA chest negative for PE however suggestive of pulmonary edema. ECG consistent with NSR with frequent PACs, poor R wave progression. Patient was placed on 5 L of supplemental oxygen. BP was normal.  Creatinine 0.9, proBNP 500, HS trop 37. Patient was admitted for acute heart failure. He was placed on IV Lasix 40 twice daily. Cardiology was consulted. Today increased oxygen requirements to 8 liters, no I/O or weights recorded. Interval history:  Patient currently with improved oxygen requirements, now at 5 L from 8 L yesterday. Creatinine stable at 1.0.  BP low this morning, Lasix drip was held. Echo is pending.     Objective:     Medications:   insulin lispro  0-4 Units SubCUTAneous TID WC    insulin lispro  0-4 Units SubCUTAneous Nightly    sodium chloride flush  5-40 mL IntraVENous 2 times per day    therapeutic multivitamin-minerals  1 tablet Oral Daily    rivaroxaban  20 mg Oral Dinner    vitamin C  500 mg Oral Daily    spironolactone  25 mg Oral Daily       IV drips:   dextrose      sodium chloride      furosemide (LASIX) 100 mg in sodium chloride 0.9 % 100 mL infusion Stopped (12/04/23 0655)       PRN:  glucose, dextrose bolus **OR** dextrose bolus, glucagon (rDNA), dextrose, sodium chloride flush, sodium chloride, ondansetron

## 2023-12-04 NOTE — PROGRESS NOTES
Temp 100.9 orally. PRN tylenol given. Patient resting quietly in bed. Denies any associated symptoms. Will continue to monitor.  Electronically signed by Millie Hemphill RN on 12/4/2023 at 7:34 AM

## 2023-12-04 NOTE — PROGRESS NOTES
Physical Therapy  Leeanna Cosby    PT orders received, chart reviewed. Discussed with RN - per RN, hold this date - patient with low BP supine, now has a sitter. Plan to hold this date and re-attempt as medically appropriate. RN in agreement. Plan to continue to follow.     Juaquin Champagne PT, DPT [id number]

## 2023-12-04 NOTE — CARE COORDINATION
Case Management Assessment  Initial Evaluation    Date/Time of Evaluation: 12/4/2023 4:32 PM  Assessment Completed by: BAO Rodriguez    If patient is discharged prior to next notation, then this note serves as note for discharge by case management. Patient Name: Yvan Griffiths                   YOB: 1940  Diagnosis: Acute pulmonary edema ([address]) [J81.0]  Altered mental status, unspecified altered mental status type [R41.82]  Acute heart failure, unspecified heart failure type ([address]) [I50.9]                   Date / Time: 12/3/2023  4:08 AM    Patient Admission Status: Inpatient   Readmission Risk (Low < 19, Mod (19-27), High > 27): Readmission Risk Score: 16.3    Current PCP: Miriam Dorado MD  PCP verified by CM? Yes    Chart Reviewed: Yes      History Provided by: Spouse, Child/Family, Medical Record  Patient Orientation: Person    Patient Cognition: Other (see comment) (oriented to self)    Hospitalization in the last 30 days (Readmission):  Yes    If yes, Readmission Assessment in  Navigator will be completed. Advance Directives:      Code Status: Limited   Patient's Primary Decision Maker is: Legal Next of Kin    Primary Decision MakerRangel Thomas Spouse - [phone]    Discharge Planning:    Patient lives with: Spouse/Significant Other Type of Home: House  Primary Care Giver: Spouse  Patient Support Systems include: Spouse/Significant Other, Children   Current Financial resources: Medicare  Current community resources: None  Current services prior to admission: Oxygen Therapy            Current DME: Other (Comment) (home O2 through [employer])            Type of Home Care services:  None    ADLS  Prior functional level: Independent in ADLs/IADLs  Current functional level:  Other (see comment) (tbd PT/OT pending)    PT AM-PAC:   /24  OT AM-PAC:   /24    Family can provide assistance at DC: Yes (wife is limited in her ability to physically support the pt)  Would you like Case Previously Declined (within the last year)

## 2023-12-04 NOTE — PROGRESS NOTES
Speech Language Pathology  Facility/Department:[facility]  dysphagia Evaluation    Name: Gary Primrose  : 1940  MRN: [id number]    Patient Diagnosis(es):   Patient Active Problem List    Diagnosis Date Noted    Acute heart failure, unspecified heart failure type ([address]) 2023    Aphasia 2023    Delirium 2023    Acute hypoxemic respiratory failure ([address]) 2023    DVT, lower extremity, distal, acute, left ([address]) 2021    Diffuse large B cell lymphoma ([address]) 2020       Past Medical History:   Diagnosis Date    Non Hodgkin's lymphoma ([address])     Type D     Past Surgical History:   Procedure Laterality Date    IR PORT PLACEMENT < 5 YEARS      LYMPH NODE BIOPSY       Reason for Referral:  Gary Primrose  was referred for a Speech Therapy evaluation to assess swallow function and/or communication. History of Present Illness  Per MD notes:  \" Pt is a 79 y.o. 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 Patient is confused all history is taken from wife and grandson as well as reviewing patient's chart. Patient fell earlier this morning they were unable to get him up. EMS found patient to have sats in the 70s despite being on 3 L placed on nonrebreather. Once here in the ER he was able to be weaned down to 5 L and now after receiving IV Lasix is back to his baseline of 3 L. Patient wife states that he had been sent to the skilled nursing facility and just discharged on the first of this month. Patient was fine yesterday according to the wife back to his normal baseline mentation but now he is confused again. \"    Date of onset: 12/3/23    Current Diet:  ADULT DIET; Regular; Low Sodium (2 gm)    Treatment Diagnosis: dysphagia    Pain:  None [id number]    General:  Chart reviewed: Yes  Behavior/Cognition: confused but cooperative  Communication Observation: WFL  Follows Directions: yes- simple  Dentition/Oral Mucosa: adequate  Oral motor exam: no asymmetry noted  Vocal Quality: clear/strong  Respiratory Status/oxygen requirements:nasal cannula  Vision/Hearing: functional for pt needs  Patient Complaint: denies dysphagia   Patient Positioning: upright in bed  Prior Level of Function was at SNF then [facility], but became more confused - consumes regular diet    Prior Dysphagia History:   None located    Bedside Swallowing Evaluation Impression 12/4/23  RN noted pt coughing with breakfast and with medications. Pt alert, oriented to this being a hospital and the month. pt followed simple commands and answered basic questions appropriately. Oral structures grossly intact, no asymmetry noted. Dentition adequate. Pt produced volitional cough and swallow. Pt presented with puree, thin liquids via cup / straw, (did not follow command for  3 ounces of uninterrupted swallows), as well as a stanley cracker. Pt demonstrated intermittent mild coughing/throat clearing. Swallow movement noted upon palpation of anterior neck. Adequate labial seal noted with no anterior loss of liquids. Pt exhibited prolonged mastication with cracker. Pt reports it is dry and needs water to clear  Recommend MBS d/t RN concerns, respiratory status and intermittent s/s of aspiration on BSE    Instrumental assessment results - scheduled this date      Prognosis:  Prognosis for improvement: fair  Barriers to reach goals: co-morbidities  Consulted and agree with results and recommendations: RN    Treatment:  Dysphagia Goals:   The pt will be seen  to address the following goals:  1- Pt will participate in MBS    2- The patient Laurence Kingsley will verbalize/demonstrate understanding of

## 2023-12-04 NOTE — PROCEDURES
INSTRUMENTAL SWALLOW REPORT  MODIFIED BARIUM SWALLOW    NAME: Leopoldo Chavarria     : 1940  MRN: [id number]       Date of Eval: 2023     Ordering Physician: Dr Alysha Price  Radiologist: Dr Clifton Late  Referring Diagnosis: Dysphagia    Past Medical History:  has a past medical history of Non Hodgkin's lymphoma ([address]). Past Surgical History:  has a past surgical history that includes lymph node biopsy and IR PORT PLACEMENT < 5 YEARS. CT chest 12/3/23  1. Breathing motion artifact limits overall evaluation. No evidence of pulmonary  thromboembolism to the level of the segmental pulmonary arteries. Distal  evaluation is limited. 2. Bilateral effusions and extensive bilateral airspace disease with areas of  septal thickening most likely related to pulmonary edema versus atypical  pneumonia. Prior MBS Results: none    Patient Complaints/Reason for Referral:  Leopoldo Chavarria was referred for a MBS to assess the efficiency of his/her swallow function, assess for aspiration, and to make recommendations regarding safe dietary consistencies, effective compensatory strategies, and safe eating environment. Onset of problem:   12/3/23    Behavior/Cognition: alert but confused  Vision/Hearing: hearing appears decreased    Impressions:  Oral Phase  Pt exhibiting oral phase dysphagia, characterized by reduced coordination, tongue pumping and slow mastication. Pt clears oral cavity with liquid wash. Pharyngeal Phase  WFL- flash/mild penetration identified with thin liquids, clears almost fully. No aspiration was identified.   No pharyngeal residue noted  Upper Esophageal Screen  Bolus appears to clear UES effectively    Treatment Dx and ICD 10: dysphagia  Position: Lateral and upright  Consistencies administered: puree, thin and solids    Penetration Aspiration Scale (PAS)  [x] 2 Material enters the airway, remains above the vocal folds, and is ejected from airway    Dysphagia Outcome Severity Scale  [x] Level

## 2023-12-04 NOTE — PROGRESS NOTES
V2.0    [facility] Progress Note      Name:  Benjamin Giron /Age/Sex: 1940  (79 y.o. male)   MRN & CSN:  [id number] & [id number] Encounter Date/Time: 2023 6:29 AM EST   Location:  [address] [place] [address] PCP: Lonnie Gauthier MD     Attending:Tristan Sheets MD       Hospital Day: 2    Assessment and Recommendations       Assessment/Plan:      Acute hypoxic respiratory failure: At home patient was found to have sats in the 70s by squad and required nonrebreather at that time. Currently patient is back at baseline 3 L nasal cannula after receiving diuretic in the emergency department. 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    : CTA showed pul edema and pleural effusions no PE as interpreted by me  Patient currently requiring 5 L nasal cannula baseline is 3 L    Pulmonary edema/elevated BNP:  Patient being evaluated for heart failure  He is discussed with ED provider admitting the patient to inpatient  Consult placed to cardiology  Echocardiogram ordered  : I reviewed note from cardiology patient is now on Lasix drip 10 mg/h  Patient with 1.5 L off over the last 24 hours     Intermittent delirium versus new onset dementia:  I have reviewed patient's hospitalization with discharge date   Office visit 2023 at oncology found patient to have normal mental status with good insight at that time. At that time patient was admitted for COVID and increased confusion. Patient was discharged to skilled nursing facility still having intermittent confusion  CT head on admission reviewed by me shows no bleed or midline shift  We will check B12 folate and TSH  : Patient increased behavioral issues during the day yesterday requiring a sitter. Patient was quite confused and very impulsive. Today sitter says patient is better. But he still very confused     Hyponatremia:  Reviewed all records showed sodium to be 130 at time of discharge on .   At time of The sinus are pneumatized. The globes and orbits are normal.  No additional abnormalities are seen in the structures of the skull base. No acute intracranial hemorrhage or mass effect. Electronically signed by Apryl Palacios DO    XR CHEST PORTABLE    Result Date: 12/3/2023  Portable chest HISTORY: Shortness of breath FINDINGS: Study is compared to November 12, 2023 study. Port catheter is noted. There has been interval development of extensive new bilateral airspace disease. No pneumothorax is seen. Heart size stable. Development of new bilateral airspace disease. Differential diagnosis includes alveolar pulmonary edema versus acute infection. Electronically signed by Apryl Palacios DO      CBC:   Recent Labs     12/03/23 0428 12/04/23 0551   WBC 5.0 4.3   HGB 12.0* 12.0*    203     BMP:    Recent Labs     12/03/23 0428   *   K 4.1   CL 92*   CO2 26   BUN 18   CREATININE 0.9   GLUCOSE 228*     Hepatic: No results for input(s): \"AST\", \"ALT\", \"ALB\", \"BILITOT\", \"ALKPHOS\" in the last 72 hours. Lipids:   Lab Results   Component Value Date/Time    CHOL 177 11/13/2023 03:53 AM    HDL 29 11/13/2023 03:53 AM    TRIG 117 11/13/2023 03:53 AM     Hemoglobin A1C:   Lab Results   Component Value Date/Time    LABA1C 7.7 11/13/2023 03:53 AM     TSH:   Lab Results   Component Value Date/Time    TSH 0.68 12/03/2023 06:27 AM     Troponin: No results found for: \"TROPONINT\"  Lactic Acid: No results for input(s): \"LACTA\" in the last 72 hours.   BNP:   Recent Labs     12/03/23 0428   PROBNP 578*     UA:  Lab Results   Component Value Date/Time    NITRU Negative 12/03/2023 01:30 PM    COLORU Yellow 12/03/2023 01:30 PM    PHUR 6.0 12/03/2023 01:30 PM    WBCUA 0-2 11/12/2023 05:56 PM    RBCUA 0-2 11/12/2023 05:56 PM    MUCUS Rare 11/12/2023 05:56 PM    BACTERIA Rare 11/12/2023 05:56 PM    CLARITYU Clear 12/03/2023 01:30 PM    SPECGRAV 1.015 12/03/2023 01:30 PM    LEUKOCYTESUR Negative 12/03/2023 01:30 PM

## 2023-12-05 LAB
ANION GAP SERPL CALCULATED.3IONS-SCNC: 9 MMOL/L (ref 3–16)
BASOPHILS # BLD: 0 K/UL (ref 0–0.2)
BASOPHILS NFR BLD: 0.5 %
BUN SERPL-MCNC: 21 MG/DL (ref 7–20)
CALCIUM SERPL-MCNC: 8.6 MG/DL (ref 8.3–10.6)
CHLORIDE SERPL-SCNC: 91 MMOL/L (ref 99–110)
CO2 SERPL-SCNC: 33 MMOL/L (ref 21–32)
CREAT SERPL-MCNC: 1 MG/DL (ref 0.8–1.3)
DEPRECATED RDW RBC AUTO: 13.3 % (ref 12.4–15.4)
EOSINOPHIL # BLD: 0.1 K/UL (ref 0–0.6)
EOSINOPHIL NFR BLD: 1.3 %
GFR SERPLBLD CREATININE-BSD FMLA CKD-EPI: >60 ML/MIN/{1.73_M2}
GLUCOSE BLD-MCNC: 170 MG/DL (ref 70–99)
GLUCOSE BLD-MCNC: 220 MG/DL (ref 70–99)
GLUCOSE BLD-MCNC: 231 MG/DL (ref 70–99)
GLUCOSE BLD-MCNC: 259 MG/DL (ref 70–99)
GLUCOSE SERPL-MCNC: 191 MG/DL (ref 70–99)
HCT VFR BLD AUTO: 38 % (ref 40.5–52.5)
HGB BLD-MCNC: 13 G/DL (ref 13.5–17.5)
LYMPHOCYTES # BLD: 0.6 K/UL (ref 1–5.1)
LYMPHOCYTES NFR BLD: 14 %
MAGNESIUM SERPL-MCNC: 1.8 MG/DL (ref 1.8–2.4)
MCH RBC QN AUTO: 32.3 PG (ref 26–34)
MCHC RBC AUTO-ENTMCNC: 34.2 G/DL (ref 31–36)
MCV RBC AUTO: 94.3 FL (ref 80–100)
MONOCYTES # BLD: 0.3 K/UL (ref 0–1.3)
MONOCYTES NFR BLD: 6.7 %
NEUTROPHILS # BLD: 3.3 K/UL (ref 1.7–7.7)
NEUTROPHILS NFR BLD: 77.5 %
PERFORMED ON: ABNORMAL
PLATELET # BLD AUTO: 238 K/UL (ref 135–450)
PMV BLD AUTO: 8.7 FL (ref 5–10.5)
POTASSIUM SERPL-SCNC: 3.8 MMOL/L (ref 3.5–5.1)
RBC # BLD AUTO: 4.03 M/UL (ref 4.2–5.9)
RBC MORPH BLD: NORMAL
SLIDE REVIEW: ABNORMAL
SODIUM SERPL-SCNC: 133 MMOL/L (ref 136–145)
WBC # BLD AUTO: 4.3 K/UL (ref 4–11)

## 2023-12-05 PROCEDURE — 94761 N-INVAS EAR/PLS OXIMETRY MLT: CPT

## 2023-12-05 PROCEDURE — C8923 2D TTE W OR W/O FOL W/CON,CO: HCPCS

## 2023-12-05 PROCEDURE — 97166 OT EVAL MOD COMPLEX 45 MIN: CPT

## 2023-12-05 PROCEDURE — 2580000003 HC RX 258: Performed by: INTERNAL MEDICINE

## 2023-12-05 PROCEDURE — 2700000000 HC OXYGEN THERAPY PER DAY

## 2023-12-05 PROCEDURE — 6370000000 HC RX 637 (ALT 250 FOR IP): Performed by: INTERNAL MEDICINE

## 2023-12-05 PROCEDURE — 6360000004 HC RX CONTRAST MEDICATION: Performed by: INTERNAL MEDICINE

## 2023-12-05 PROCEDURE — 80048 BASIC METABOLIC PNL TOTAL CA: CPT

## 2023-12-05 PROCEDURE — 6360000002 HC RX W HCPCS: Performed by: INTERNAL MEDICINE

## 2023-12-05 PROCEDURE — 85025 COMPLETE CBC W/AUTO DIFF WBC: CPT

## 2023-12-05 PROCEDURE — 97530 THERAPEUTIC ACTIVITIES: CPT

## 2023-12-05 PROCEDURE — 97162 PT EVAL MOD COMPLEX 30 MIN: CPT

## 2023-12-05 PROCEDURE — 83735 ASSAY OF MAGNESIUM: CPT

## 2023-12-05 PROCEDURE — 36415 COLL VENOUS BLD VENIPUNCTURE: CPT

## 2023-12-05 PROCEDURE — 99452 NTRPROF PH1/NTRNET/EHR RFRL: CPT | Performed by: INTERNAL MEDICINE

## 2023-12-05 PROCEDURE — 97535 SELF CARE MNGMENT TRAINING: CPT

## 2023-12-05 PROCEDURE — 1200000000 HC SEMI PRIVATE

## 2023-12-05 RX ORDER — MIDODRINE HYDROCHLORIDE 5 MG/1
5 TABLET ORAL
Status: DISCONTINUED | OUTPATIENT
Start: 2023-12-05 | End: 2023-12-07

## 2023-12-05 RX ADMIN — FUROSEMIDE 10 MG/HR: 10 INJECTION, SOLUTION INTRAMUSCULAR; INTRAVENOUS at 16:39

## 2023-12-05 RX ADMIN — SODIUM CHLORIDE, PRESERVATIVE FREE 10 ML: 5 INJECTION INTRAVENOUS at 20:39

## 2023-12-05 RX ADMIN — RIVAROXABAN 20 MG: 20 TABLET, FILM COATED ORAL at 17:24

## 2023-12-05 RX ADMIN — INSULIN LISPRO 1 UNITS: 100 INJECTION, SOLUTION INTRAVENOUS; SUBCUTANEOUS at 17:24

## 2023-12-05 RX ADMIN — INSULIN LISPRO 1 UNITS: 100 INJECTION, SOLUTION INTRAVENOUS; SUBCUTANEOUS at 09:03

## 2023-12-05 RX ADMIN — MIDODRINE HYDROCHLORIDE 5 MG: 5 TABLET ORAL at 17:24

## 2023-12-05 RX ADMIN — OXYCODONE HYDROCHLORIDE AND ACETAMINOPHEN 500 MG: 500 TABLET ORAL at 11:20

## 2023-12-05 RX ADMIN — SPIRONOLACTONE 25 MG: 25 TABLET ORAL at 11:20

## 2023-12-05 RX ADMIN — INSULIN LISPRO 2 UNITS: 100 INJECTION, SOLUTION INTRAVENOUS; SUBCUTANEOUS at 12:46

## 2023-12-05 RX ADMIN — Medication 1 TABLET: at 11:19

## 2023-12-05 RX ADMIN — PERFLUTREN 1.5 ML: 6.52 INJECTION, SUSPENSION INTRAVENOUS at 10:30

## 2023-12-05 RX ADMIN — SODIUM CHLORIDE, PRESERVATIVE FREE 10 ML: 5 INJECTION INTRAVENOUS at 11:26

## 2023-12-05 RX ADMIN — MIDODRINE HYDROCHLORIDE 5 MG: 5 TABLET ORAL at 12:49

## 2023-12-05 ASSESSMENT — PAIN SCALES - GENERAL: PAINLEVEL_OUTOF10: 0

## 2023-12-05 NOTE — PROGRESS NOTES
Patient SpO2 80% 5 L NC. Resps 26. Increased oxygen to 10 L. SpO2 improved to 85%. Increased again to 15 L high flow nasal canula and SpO2 improved to 92%. Resps 20. Patient sleeping quietly in bed. Occasional productive cough noted for thick clear sputum. Camera in use for safety. Will continue to monitor patient status.  Electronically signed by Josemanuel Alba RN on 12/5/2023 at 8:00 AM

## 2023-12-05 NOTE — DISCHARGE INSTRUCTIONS
Extra Heart Failure sites:     https://[employer]. com/publication/?t=983975   --- this is American Heart Association interactive Healthier Living with Heart Failure guidebook. Please click hyperlink or copy / paste link into search bar. Use your mouse to scroll through the pages. Lots of information about weight monitoring, diet tips, activity, meds, etc    HF [place] carlton  -- this is a free smart phone carlton available for iPhone and Android download. Use your phone to track sodium / fluid intake, zone tool symptom tracking, weights, medications, etc. Click on this hyperlink  HF [place] [employer]   for QR code for easy download. DASH (Dietary Approach to Stop Hypertension) diet --  [employer] -- this diet is a flexible eating plan that promotes heart healthy eating style. Click on hyperlink or copy / paste link into search bar. Lots of low sodium recipes and tips.     [employer]  -- more free recipes

## 2023-12-05 NOTE — PROGRESS NOTES
min A so would expect same for toilet transfer. AROM: Within functional limits  Strength: Generally decreased, functional  Coordination: Within functional limits  ADL  Feeding: Setup  Feeding Skilled Clinical Factors: pt finishing up breakfast at end of session with setup for beverages  Grooming: Setup  Grooming Skilled Clinical Factors: from supine position pt washed face and then once sitting on EOB pt combed hair  LE Bathing: Moderate assistance  LE Bathing Skilled Clinical Factors: Pt required maximal cues and setup of bath wipes to clean jakob area from saturated brief. Pt wipes thighs and claves from sitting as well. Pt required A for rear  UE Dressing: Minimal assistance  UE Dressing Skilled Clinical Factors: to doff gown and don new gown. LE Dressing: Maximum assistance  LE Dressing Skilled Clinical Factors: Pt required max A to don brief and initially attempted to don brief onto arms and pull it over head like a shirt. Pt needed maximal redirection and prompting to correct effor. Pt then displayed maximal effort to perform forward flexed posture from sitting and don brief. Pt initially donned both legs through same hole in brief with no awareness of error. Pt required max A for ax completion and correction. Toileting: Maximum assistance  Toileting Skilled Clinical Factors: pt reports he is continent but displays urinary incontinence during session not knowing that brief was wet. Pt required max A to doff brief and don new  Functional Mobility: Contact guard assistance  Functional Mobility Skilled Clinical Factors: pt able to march at edge of bed and take steps from bed to chair with CGA and no AD (reports he does not use AD at home). Pt requires cues for pace and safety.   Skin Care: Bath wipes        Bed mobility  Supine to Sit: Contact guard assistance  Scooting: Contact guard assistance  Transfers  Sit to stand: Contact guard assistance  Stand to sit: Contact guard assistance  Vision  Vision: Within ADL T-Scale Score : 35.96  ADL Inpatient CMS 0-100% Score: 53.32  ADL Inpatient CMS G-Code Modifier : CK         Goals  Short Term Goals  Time Frame for Short Term Goals: by discharge  Short Term Goal 1: Pt will perform LB dressing routine with set-up and no VC needed  Short Term Goal 2: Pt will perform fx mobility to/from bathroom with SBA while spO2 >88%  Short Term Goal 3: Pt will verbalize 3 energy conservation strategies  Short Term Goal 4: Pt will be Ox4 with only 1 VC needed  Patient Goals   Patient goals : not stated       Therapy Time   Individual Concurrent Group Co-treatment   Time In 0850         Time Out 0933         Minutes 43         Timed Code Treatment Minutes: 28 Minutes (+ 15 min OT eval)       Sharda Tucker OT

## 2023-12-05 NOTE — CARE COORDINATION
CM following: CM met with pt's wife, Leopoldo Rase, at bedside. CM provided the [facility] eligibility phone number for wife to begin determining pt eligibility for [facility] assistance. Plan continues to be to return home with [address], [address] referral has been made, wife aware that if additional assistance needed at home it would be private duty care related. CM will continue to follow.   Electronically signed by BAO Ocampo on 12/5/2023 at 3:57 PM  [phone]

## 2023-12-05 NOTE — PROGRESS NOTES
Oxygen decreased to 10 L HFNC. SpO2 90%. Resps 20. No apparent distress. Continuing to monitor.  Electronically signed by Jaguar Gillis RN on 12/5/2023 at 8:02 AM

## 2023-12-05 NOTE — PROGRESS NOTES
Palliative Care Chart Review  and Check in Note:     NAME:  Joe Bush Date: 12/3/2023  Hospital Day:  Hospital Day: 3   Current Code status: Limited    Palliative care is continuing to following Mr. Bryce Peña for symptom management,  and goals of care discussion as needed. Patient's chart reviewed today 12/5/23. Pt is calmer today per RN, remains confused. Reviewed echo. The following are the currently established goals/code status, and Symptom management. Goals of care: Pt/family want to continue the current management in hopes that he can stabilize and return home soon.      Code status: DNR/DNI (Limited- no to all interventions)    Discharge plan: d/c home when medically ready    Robert Brunner NP  [address] [facility]

## 2023-12-05 NOTE — PROGRESS NOTES
EMR was reviewed. Echo is pending. Patient with increased oxygen requirements currently at 8 L from 5 L yesterday. BP low 80/60 mmHg, lasix drip was held. I/O and weights unchanged. Creatinine stable 1.0, bicarb increasing at 33. Assessment & Plan:      1. Hypoxia. Most likely due to combination of AHF and recent COVID infection. 2. Acute on chronic HFpEF. Patient is volume overloaded, hemo stable. 3. Recent COVID infection. 4. DBCL  5. Hypercoagulable state with PE/DVT, on Xarelto. CTA chest neg for PE. 6. DM        -  Increase lasix 10 mg drip if BP allows and continue lefty 25 daily. - Will start midodrine 5 q 8 to allow lasix drip. - Strict I's and O's every shift and standing weights if possible, low-salt diet, daily BMP with reflex to Mg (correct lytes for goals K >4.0 and Mg > 2.0) and wean supplemental oxygen to off (or down to baseline supplemental oxygen requirements) for sats greater than 90%. - Echo pending; if low EF, will need low dose dobutamine drip 2.5 mcg/kg/min, no titration. I would like to thank you for providing me the opportunity to participate in the care of your patient. If you have any questions, please do not hesitate to contact me.      Doug Stokes MD, [facility] [place] [address]  Ph: [phone]  Fax: [phone]

## 2023-12-06 LAB
ANION GAP SERPL CALCULATED.3IONS-SCNC: 10 MMOL/L (ref 3–16)
BASOPHILS # BLD: 0 K/UL (ref 0–0.2)
BASOPHILS NFR BLD: 0 %
BUN SERPL-MCNC: 25 MG/DL (ref 7–20)
CALCIUM SERPL-MCNC: 8.7 MG/DL (ref 8.3–10.6)
CHLORIDE SERPL-SCNC: 90 MMOL/L (ref 99–110)
CO2 SERPL-SCNC: 33 MMOL/L (ref 21–32)
CREAT SERPL-MCNC: 1.1 MG/DL (ref 0.8–1.3)
DEPRECATED RDW RBC AUTO: 13.2 % (ref 12.4–15.4)
EOSINOPHIL # BLD: 0.1 K/UL (ref 0–0.6)
EOSINOPHIL NFR BLD: 2 %
GFR SERPLBLD CREATININE-BSD FMLA CKD-EPI: >60 ML/MIN/{1.73_M2}
GLUCOSE BLD-MCNC: 196 MG/DL (ref 70–99)
GLUCOSE BLD-MCNC: 233 MG/DL (ref 70–99)
GLUCOSE BLD-MCNC: 274 MG/DL (ref 70–99)
GLUCOSE BLD-MCNC: 322 MG/DL (ref 70–99)
GLUCOSE SERPL-MCNC: 182 MG/DL (ref 70–99)
HCT VFR BLD AUTO: 35.4 % (ref 40.5–52.5)
HGB BLD-MCNC: 12.2 G/DL (ref 13.5–17.5)
LYMPHOCYTES # BLD: 0.7 K/UL (ref 1–5.1)
LYMPHOCYTES NFR BLD: 15 %
MAGNESIUM SERPL-MCNC: 1.7 MG/DL (ref 1.8–2.4)
MCH RBC QN AUTO: 31.9 PG (ref 26–34)
MCHC RBC AUTO-ENTMCNC: 34.6 G/DL (ref 31–36)
MCV RBC AUTO: 92.3 FL (ref 80–100)
MONOCYTES # BLD: 0.2 K/UL (ref 0–1.3)
MONOCYTES NFR BLD: 5 %
NEUTROPHILS # BLD: 3.6 K/UL (ref 1.7–7.7)
NEUTROPHILS NFR BLD: 78 %
NT-PROBNP SERPL-MCNC: 150 PG/ML (ref 0–449)
PERFORMED ON: ABNORMAL
PLATELET # BLD AUTO: 221 K/UL (ref 135–450)
PMV BLD AUTO: 8.8 FL (ref 5–10.5)
POTASSIUM SERPL-SCNC: 3.8 MMOL/L (ref 3.5–5.1)
RBC # BLD AUTO: 3.83 M/UL (ref 4.2–5.9)
SODIUM SERPL-SCNC: 133 MMOL/L (ref 136–145)
WBC # BLD AUTO: 4.6 K/UL (ref 4–11)

## 2023-12-06 PROCEDURE — 51798 US URINE CAPACITY MEASURE: CPT

## 2023-12-06 PROCEDURE — 80048 BASIC METABOLIC PNL TOTAL CA: CPT

## 2023-12-06 PROCEDURE — 2580000003 HC RX 258: Performed by: INTERNAL MEDICINE

## 2023-12-06 PROCEDURE — 6360000002 HC RX W HCPCS: Performed by: SURGERY

## 2023-12-06 PROCEDURE — 6370000000 HC RX 637 (ALT 250 FOR IP): Performed by: INTERNAL MEDICINE

## 2023-12-06 PROCEDURE — 1200000000 HC SEMI PRIVATE

## 2023-12-06 PROCEDURE — 85025 COMPLETE CBC W/AUTO DIFF WBC: CPT

## 2023-12-06 PROCEDURE — 83880 ASSAY OF NATRIURETIC PEPTIDE: CPT

## 2023-12-06 PROCEDURE — 2580000003 HC RX 258: Performed by: SURGERY

## 2023-12-06 PROCEDURE — 97535 SELF CARE MNGMENT TRAINING: CPT

## 2023-12-06 PROCEDURE — 97530 THERAPEUTIC ACTIVITIES: CPT

## 2023-12-06 PROCEDURE — 92526 ORAL FUNCTION THERAPY: CPT

## 2023-12-06 PROCEDURE — 99291 CRITICAL CARE FIRST HOUR: CPT | Performed by: INTERNAL MEDICINE

## 2023-12-06 PROCEDURE — 6360000002 HC RX W HCPCS: Performed by: INTERNAL MEDICINE

## 2023-12-06 PROCEDURE — 83735 ASSAY OF MAGNESIUM: CPT

## 2023-12-06 PROCEDURE — 36415 COLL VENOUS BLD VENIPUNCTURE: CPT

## 2023-12-06 PROCEDURE — 97116 GAIT TRAINING THERAPY: CPT

## 2023-12-06 PROCEDURE — 94761 N-INVAS EAR/PLS OXIMETRY MLT: CPT

## 2023-12-06 RX ORDER — DOBUTAMINE HYDROCHLORIDE 200 MG/100ML
2.5 INJECTION INTRAVENOUS CONTINUOUS
Status: DISCONTINUED | OUTPATIENT
Start: 2023-12-06 | End: 2023-12-10

## 2023-12-06 RX ADMIN — MIDODRINE HYDROCHLORIDE 5 MG: 5 TABLET ORAL at 09:43

## 2023-12-06 RX ADMIN — FUROSEMIDE 10 MG/HR: 10 INJECTION, SOLUTION INTRAMUSCULAR; INTRAVENOUS at 09:41

## 2023-12-06 RX ADMIN — INSULIN LISPRO 2 UNITS: 100 INJECTION, SOLUTION INTRAVENOUS; SUBCUTANEOUS at 17:49

## 2023-12-06 RX ADMIN — SODIUM CHLORIDE, PRESERVATIVE FREE 10 ML: 5 INJECTION INTRAVENOUS at 09:50

## 2023-12-06 RX ADMIN — MIDODRINE HYDROCHLORIDE 5 MG: 5 TABLET ORAL at 11:54

## 2023-12-06 RX ADMIN — RIVAROXABAN 20 MG: 20 TABLET, FILM COATED ORAL at 18:07

## 2023-12-06 RX ADMIN — SODIUM CHLORIDE, PRESERVATIVE FREE 10 ML: 5 INJECTION INTRAVENOUS at 21:30

## 2023-12-06 RX ADMIN — INSULIN LISPRO 3 UNITS: 100 INJECTION, SOLUTION INTRAVENOUS; SUBCUTANEOUS at 11:53

## 2023-12-06 RX ADMIN — DOBUTAMINE HYDROCHLORIDE 2.5 MCG/KG/MIN: 200 INJECTION INTRAVENOUS at 11:51

## 2023-12-06 RX ADMIN — FUROSEMIDE 10 MG/HR: 10 INJECTION, SOLUTION INTRAMUSCULAR; INTRAVENOUS at 16:23

## 2023-12-06 RX ADMIN — MIDODRINE HYDROCHLORIDE 5 MG: 5 TABLET ORAL at 18:07

## 2023-12-06 RX ADMIN — OXYCODONE HYDROCHLORIDE AND ACETAMINOPHEN 500 MG: 500 TABLET ORAL at 09:43

## 2023-12-06 RX ADMIN — Medication 1 TABLET: at 09:44

## 2023-12-06 RX ADMIN — SPIRONOLACTONE 25 MG: 25 TABLET ORAL at 09:44

## 2023-12-06 NOTE — PROGRESS NOTES
Lasix gtt was held due to patient not meeting minimum systolic parameter of  330 mmHg. Will adjust minimum parameter to 85 mm Hg. Patient only had drip running for a few hours per nursing. Will need to see how patient responds to gtt at 10 today. His lungs sound wet and his o2 requirement hasn't budged.

## 2023-12-06 NOTE — PROGRESS NOTES
assistance (asked pt to brush teeth, pt rinsed mouth with water several times, and then stated he needed to sit down)  LE Dressing: Contact guard assistance (SBA and cues to don B hospital socks, CGA in stance and cues to don hospital socks)  Skin Care: Bath wipes     Activity Tolerance  Pt on 10 liters high flow O2, after functional mobility and task at sink O2 sat=77%, recovered to 93% after ~8 minutes. After using urinal 02 sat=85%, needing ~3 minutes to increase to 91%     Transfers  Sit to stand: Contact guard assistance (cues for hand placement)  Stand to sit: Contact guard assistance (cues for safe positioning and hand placement)     Cognition  Overall Cognitive Status: Exceptions  Arousal/Alertness: Appropriate responses to stimuli  Following Commands: Follows one step commands with increased time; Follows one step commands with repetition  Attention Span: Difficulty attending to directions  Memory: Decreased short term memory;Decreased recall of recent events  Safety Judgement: Decreased awareness of need for assistance;Decreased awareness of need for safety  Problem Solving: Assistance required to generate solutions;Assistance required to identify errors made;Assistance required to correct errors made;Assistance required to implement solutions  Insights: Not aware of deficits  Initiation: Requires cues for all  Sequencing: Requires cues for all  Cognition Comment: nonsensical speech, poor recall and STM, no awareness of deficits  Orientation  Overall Orientation Status: Impaired (oriented to name only)  Orientation Level: Disoriented X4                  Education Given To: Patient  Education Provided: Role of Therapy;Plan of Care;Transfer Training  Education Provided Comments: pursed lip breathing-unable to perform with cues/demonstration multiple times  Education Method: Demonstration  Barriers to Learning: Cognition  Education Outcome: Continued education needed                        G-Code     OutComes

## 2023-12-06 NOTE — PROGRESS NOTES
500, HS trop 37. Patient was admitted for acute heart failure. He was placed on IV Lasix 40 twice daily. Cardiology was consulted. Assessment   Assessment: pt remains very confused throughout session with nonsensical speech and poor recall of recent events. pt seems to be functioning physically close to baseline performing transfers and short distance amb with no AD and CGA however requires max VCs for safety. pt on 3Lo2 at baseline since recent covid diagnosis however now dependent on 10Lo2 during mobility to maintain o2 sats in low 90s with several minutes to recover from 77-85% with any activity. pt very limited by safety awareness and poor awareness of deficits making him a high fall risk that should not ambulate alone. If family can provide 24hr supervision, recommend pt return home with HHPT. If family unable to provide capable assist, pt may benefit from further IP PT at [place]. Continue PT POC  Activity Tolerance: Patient limited by endurance;Treatment limited secondary to medical complications (pt on 10 Lo2 with desat to 77% after amb and several minutes to return to low 90s, and desat to 85% with any short bout of standing requiring 3-5 min to recover to low 90s. pt unable to understand pursed lip breathing technique to recover)  Equipment Needed: No  Other: may benefit from RW for longer distances if does not own one     Plan    Physical Therapy Plan  General Plan:  (2-5)  Current Treatment Recommendations: Strengthening;Balance training;Functional mobility training;Transfer training;Gait training;Stair training;Neuromuscular re-education; Endurance training;Home exercise program;Safety education & training;Patient/Caregiver education & training; Therapeutic activities     Restrictions  Restrictions/Precautions  Restrictions/Precautions:  (Dysphagia - Soft and Bite Sized;  Low Sodium (2 gm))  Position Activity Restriction  Other position/activity restrictions: up in chair; OT/PT eval and treat     Subjective clearance        Other Specialty Interventions  Other Treatments/Modalities: pt stood at urinal without success, changed brief and pants with OT, and stood at sink for face washing. pt with poor tolerance for standing  Safety Devices  Type of Devices: Call light within reach; Chair alarm in place;Gait belt;Nurse notified;Sitter present; Left in chair       Goals  Short Term Goals  Time Frame for Short Term Goals: by dc (all ongoing)   Short Term Goal 1: pt will perform bed mobility with mod I  Short Term Goal 2: pt will perform functional transfers with LRAD and mod I  Short Term Goal 3: pt will ambulate 150' with LRAD and mod I  Patient Goals   Patient Goals : to go home    Education  Patient Education  Education Given To: Patient  Education Provided: Role of Therapy;Plan of Care  Education Method: Demonstration;Verbal  Barriers to Learning: Cognition  Education Outcome: Continued education needed;Verbalized understanding    AM-PAC - Mobility    AM-PAC Basic Mobility - Inpatient   How much help is needed turning from your back to your side while in a flat bed without using bedrails?: A Little  How much help is needed moving from lying on your back to sitting on the side of a flat bed without using bedrails?: A Little  How much help is needed moving to and from a bed to a chair?: A Little  How much help is needed standing up from a chair using your arms?: A Little  How much help is needed walking in hospital room?: A Little  How much help is needed climbing 3-5 steps with a railing?: A Little  AM-[facility] Inpatient Mobility Raw Score : 18  AM-PAC Inpatient T-Scale Score : 43.63  Mobility Inpatient CMS 0-100% Score: 46.58  Mobility Inpatient CMS G-Code Modifier : CK         Therapy Time   Individual Concurrent Group Co-treatment   Time In 1042         Time Out 1125         Minutes 43             Total: 43 min     Urszula Garnett, SARTHAK

## 2023-12-06 NOTE — PROGRESS NOTES
V2.0    [facility] Progress Note      Name:  Prashanth Hays /Age/Sex: 1940  (79 y.o. male)   MRN & CSN:  [id number] & [id number] Encounter Date/Time: 2023 6:29 AM EST   Location:  [address] [place] [address] PCP: Anna Marie Law MD     Attending:Dave Eldridge MD       Hospital Day: 4    Assessment and Recommendations       Assessment/Plan: IV diuresis for acute CHF, dobutamine added     Acute hypoxic respiratory failure: At home patient was found to have sats in the 70s by squad and required nonrebreather at that time. Currently patient is back at baseline 3 L nasal cannula after receiving diuretic in the emergency department. 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    : CTA showed pul edema and pleural effusions no PE as interpreted by me  Patient currently requiring 5 L nasal cannula baseline is 3 L    Pulmonary edema/elevated BNP:  Patient being evaluated for heart failure  He is discussed with ED provider admitting the patient to inpatient  Consult placed to cardiology  Echocardiogram ordered  : I reviewed note from cardiology patient is now on Lasix drip 10 mg/h  Patient with 1.5 L off over the last 24 hours     Intermittent delirium versus new onset dementia:  I have reviewed patient's hospitalization with discharge date   Office visit 2023 at oncology found patient to have normal mental status with good insight at that time. At that time patient was admitted for COVID and increased confusion. Patient was discharged to skilled nursing facility still having intermittent confusion  CT head on admission reviewed by me shows no bleed or midline shift  We will check B12 folate and TSH  : Patient increased behavioral issues during the day yesterday requiring a sitter. Patient was quite confused and very impulsive. Today sitter says patient is better.   But he still very confused     Hyponatremia:  Reviewed all records showed sodium to be 130 at time of Maximum intensity projection images were evaluated. Up-to-date CT equipment and radiation dose reduction techniques were employed. COMPARISON: Chest x-ray 12/3/2023. PET/CT 8/1/2022. Chest CT 8/1/2022. Myriam Ahmadi FINDINGS: Vasculature: Good opacification of the pulmonary arteries. Limited exam due to extensive breathing motion artifact. No filling defect identified to the level of the segmental pulmonary arteries. Subsegmental emboli would be difficult to exclude. Lungs and pleura: Bilateral pleural effusions. Diffuse bilateral airspace disease with groundglass opacity and septal thickening most likely related to pulmonary edema versus atypical pneumonia. Mediastinum: No mediastinal lymphadenopathy. No hilar lymphadenopathy. Normal heart size. No pericardial effusion. Small hiatal hernia. Neck and chest wall: No axillary or supraclavicular lymphadenopathy identified. Right upper chest power injectable port with tip in the lower superior vena cava. Upper abdomen: Liver and spleen are unremarkable. No pancreatic abnormality. Gallbladder is unremarkable. Bones: No suspicious osseous abnormality. 1. Breathing motion artifact limits overall evaluation. No evidence of pulmonary thromboembolism to the level of the segmental pulmonary arteries. Distal evaluation is limited. 2. Bilateral effusions and extensive bilateral airspace disease with areas of septal thickening most likely related to pulmonary edema versus atypical pneumonia. CT HEAD WO CONTRAST    Result Date: 12/3/2023  CT HEAD WITHOUT CONTRAST HISTORY: Altered mental status PROCEDURE: Noncontrast CT the brain performed with 5 mm contiguous sections. Individualized dose optimization technique was used in order to meet ALARA standards for radiation dose reduction.   In addition to vendor specific dose reduction algorithms, the  dose reduction techniques vary based on the specific scanner utilized but frequently include automated exposure control, adjustment of the mA

## 2023-12-06 NOTE — CARE COORDINATION
Case Management Assessment           Daily Note                 Date/ Time of Note: 12/6/2023 4:20 PM         Note completed by: Tripp Lee    Patient Name: Vinicius Warren  YOB: 1940    Diagnosis:Acute pulmonary edema ([address]) [J81.0]  Altered mental status, unspecified altered mental status type [R41.82]  Acute heart failure, unspecified heart failure type ([address]) [I50.9]  Patient Admission Status: Inpatient  Date of Admission:12/3/2023  4:08 AM    Length of Stay: 3 GLOS: GMLOS: 3.9 Readmission Risk Score: Readmission Risk Score: 15.2    Current Plan of Care: lasix gtt dobutamine gtt, wean O2 as tolerated  ________________________________________________________________________________________  PT AM-PAC: 18 / 24 per last evaluation on: 12/6    OT AM-PAC: 17 / 24 per last evaluation on: 12/6    DME Needs for discharge: possibly RW & shower chair if does not have  DME Ordered: No DME Delivered: No  DME Company:   ________________________________________________________________________________________  Discharge Plan: Home with [address]: tbd  Pre-cert required for SNF: NO  COVID Result:    Lab Results   Component Value Date/Time    COVID19 DETECTED 11/12/2023 05:56 PM    COVID19 Not Detected 06/25/2020 02:40 PM       Transportation PLAN for discharge: family    Tentative discharge date: 12/8    Potential assistance Purchasing Medications: Potential Assistance Purchasing Medications: No  Does Patient want to participate in local refill/ meds to beds program?:      Current barriers to discharge: Medical complications and Medication managment    Referrals completed: Not Applicable    Resources/ information provided: [address] [facility] and [address]  ________________________________________________________________________________________  Case Management Notes:     CM spoke with pt and wife at bedside regarding DCP- plan to return home with wife and [address] [facility].  CM

## 2023-12-06 NOTE — PROGRESS NOTES
Speech Language Pathology  Facility/Department:[facility] 4 PCU  dysphagia treatment note     Name: Chinyere Patel  : 1940  MRN: [id number]    Patient Diagnosis(es):   Patient Active Problem List    Diagnosis Date Noted    Acute heart failure, unspecified heart failure type ([address]) 2023    Aphasia 2023    Delirium 2023    Acute hypoxemic respiratory failure ([address]) 2023    DVT, lower extremity, distal, acute, left ([address]) 2021    Diffuse large B cell lymphoma ([address]) 2020     Past Medical History:   Diagnosis Date    Non Hodgkin's lymphoma ([address])     Type D     Past Surgical History:   Procedure Laterality Date    IR PORT PLACEMENT < 5 YEARS      LYMPH NODE BIOPSY       History of Present Illness  Per MD notes:  \" Pt is a 79 y.o. 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 Patient is confused all history is taken from wife and grandson as well as reviewing patient's chart. Patient fell earlier this morning they were unable to get him up. EMS found patient to have sats in the 70s despite being on 3 L placed on nonrebreather. Once here in the ER he was able to be weaned down to 5 L and now after receiving IV Lasix is back to his baseline of 3 L. Patient wife states that he had been sent to the skilled nursing facility and just discharged on the first of this month. Patient was fine yesterday according to the wife back to his normal baseline mentation but now he is confused again. \"    Date of onset: 12/3/23    Current Diet:  ADULT DIET; Dysphagia - Soft and Bite Sized;  Low Sodium (2 gm)    Treatment Diagnosis: dysphagia    Pain:  None

## 2023-12-07 LAB
ANION GAP SERPL CALCULATED.3IONS-SCNC: 12 MMOL/L (ref 3–16)
BASOPHILS # BLD: 0 K/UL (ref 0–0.2)
BASOPHILS NFR BLD: 0 %
BUN SERPL-MCNC: 35 MG/DL (ref 7–20)
CALCIUM SERPL-MCNC: 8.6 MG/DL (ref 8.3–10.6)
CHLORIDE SERPL-SCNC: 88 MMOL/L (ref 99–110)
CO2 SERPL-SCNC: 32 MMOL/L (ref 21–32)
CREAT SERPL-MCNC: 1.6 MG/DL (ref 0.8–1.3)
DEPRECATED RDW RBC AUTO: 13.5 % (ref 12.4–15.4)
EOSINOPHIL # BLD: 0 K/UL (ref 0–0.6)
EOSINOPHIL NFR BLD: 0 %
GFR SERPLBLD CREATININE-BSD FMLA CKD-EPI: 42 ML/MIN/{1.73_M2}
GLUCOSE BLD-MCNC: 257 MG/DL (ref 70–99)
GLUCOSE BLD-MCNC: 308 MG/DL (ref 70–99)
GLUCOSE BLD-MCNC: 319 MG/DL (ref 70–99)
GLUCOSE BLD-MCNC: 338 MG/DL (ref 70–99)
GLUCOSE SERPL-MCNC: 289 MG/DL (ref 70–99)
HCT VFR BLD AUTO: 35.2 % (ref 40.5–52.5)
HGB BLD-MCNC: 12.3 G/DL (ref 13.5–17.5)
LYMPHOCYTES # BLD: 0.5 K/UL (ref 1–5.1)
LYMPHOCYTES NFR BLD: 10 %
MAGNESIUM SERPL-MCNC: 1.7 MG/DL (ref 1.8–2.4)
MCH RBC QN AUTO: 32.2 PG (ref 26–34)
MCHC RBC AUTO-ENTMCNC: 34.9 G/DL (ref 31–36)
MCV RBC AUTO: 92.2 FL (ref 80–100)
MONOCYTES # BLD: 0.3 K/UL (ref 0–1.3)
MONOCYTES NFR BLD: 6 %
NEUTROPHILS # BLD: 4.2 K/UL (ref 1.7–7.7)
NEUTROPHILS NFR BLD: 84 %
OVALOCYTES BLD QL SMEAR: ABNORMAL
PERFORMED ON: ABNORMAL
PLATELET # BLD AUTO: 247 K/UL (ref 135–450)
PMV BLD AUTO: 8.9 FL (ref 5–10.5)
POTASSIUM SERPL-SCNC: 3.8 MMOL/L (ref 3.5–5.1)
RBC # BLD AUTO: 3.82 M/UL (ref 4.2–5.9)
SODIUM SERPL-SCNC: 132 MMOL/L (ref 136–145)
STOMATOCYTES BLD QL SMEAR: ABNORMAL
WBC # BLD AUTO: 5 K/UL (ref 4–11)

## 2023-12-07 PROCEDURE — 2700000000 HC OXYGEN THERAPY PER DAY

## 2023-12-07 PROCEDURE — 6370000000 HC RX 637 (ALT 250 FOR IP): Performed by: INTERNAL MEDICINE

## 2023-12-07 PROCEDURE — 80048 BASIC METABOLIC PNL TOTAL CA: CPT

## 2023-12-07 PROCEDURE — 2060000000 HC ICU INTERMEDIATE R&B

## 2023-12-07 PROCEDURE — 2580000003 HC RX 258: Performed by: SURGERY

## 2023-12-07 PROCEDURE — 92526 ORAL FUNCTION THERAPY: CPT

## 2023-12-07 PROCEDURE — 94761 N-INVAS EAR/PLS OXIMETRY MLT: CPT

## 2023-12-07 PROCEDURE — 6360000002 HC RX W HCPCS: Performed by: SURGERY

## 2023-12-07 PROCEDURE — 85025 COMPLETE CBC W/AUTO DIFF WBC: CPT

## 2023-12-07 PROCEDURE — 36415 COLL VENOUS BLD VENIPUNCTURE: CPT

## 2023-12-07 PROCEDURE — 83735 ASSAY OF MAGNESIUM: CPT

## 2023-12-07 PROCEDURE — 2580000003 HC RX 258: Performed by: INTERNAL MEDICINE

## 2023-12-07 PROCEDURE — 99291 CRITICAL CARE FIRST HOUR: CPT | Performed by: INTERNAL MEDICINE

## 2023-12-07 RX ORDER — MIDODRINE HYDROCHLORIDE 5 MG/1
10 TABLET ORAL
Status: DISCONTINUED | OUTPATIENT
Start: 2023-12-07 | End: 2023-12-11 | Stop reason: HOSPADM

## 2023-12-07 RX ADMIN — MIDODRINE HYDROCHLORIDE 10 MG: 5 TABLET ORAL at 17:30

## 2023-12-07 RX ADMIN — INSULIN LISPRO 3 UNITS: 100 INJECTION, SOLUTION INTRAVENOUS; SUBCUTANEOUS at 12:46

## 2023-12-07 RX ADMIN — OXYCODONE HYDROCHLORIDE AND ACETAMINOPHEN 500 MG: 500 TABLET ORAL at 08:51

## 2023-12-07 RX ADMIN — SODIUM CHLORIDE, PRESERVATIVE FREE 10 ML: 5 INJECTION INTRAVENOUS at 08:51

## 2023-12-07 RX ADMIN — FUROSEMIDE 10 MG/HR: 10 INJECTION, SOLUTION INTRAMUSCULAR; INTRAVENOUS at 12:46

## 2023-12-07 RX ADMIN — INSULIN LISPRO 4 UNITS: 100 INJECTION, SOLUTION INTRAVENOUS; SUBCUTANEOUS at 20:35

## 2023-12-07 RX ADMIN — INSULIN LISPRO 3 UNITS: 100 INJECTION, SOLUTION INTRAVENOUS; SUBCUTANEOUS at 17:30

## 2023-12-07 RX ADMIN — FUROSEMIDE 10 MG/HR: 10 INJECTION, SOLUTION INTRAMUSCULAR; INTRAVENOUS at 01:40

## 2023-12-07 RX ADMIN — INSULIN LISPRO 2 UNITS: 100 INJECTION, SOLUTION INTRAVENOUS; SUBCUTANEOUS at 08:50

## 2023-12-07 RX ADMIN — MIDODRINE HYDROCHLORIDE 5 MG: 5 TABLET ORAL at 08:51

## 2023-12-07 RX ADMIN — Medication 1 TABLET: at 08:51

## 2023-12-07 RX ADMIN — RIVAROXABAN 20 MG: 20 TABLET, FILM COATED ORAL at 17:30

## 2023-12-07 RX ADMIN — MIDODRINE HYDROCHLORIDE 10 MG: 5 TABLET ORAL at 12:47

## 2023-12-07 RX ADMIN — FUROSEMIDE 10 MG/HR: 10 INJECTION, SOLUTION INTRAMUSCULAR; INTRAVENOUS at 20:32

## 2023-12-07 ASSESSMENT — PAIN SCALES - GENERAL: PAINLEVEL_OUTOF10: 0

## 2023-12-07 NOTE — CARE COORDINATION
Chart review day 4- pt from home with wife. Gisele for home O2. Agreeable to home care. Recent stay at [facility]. Recent COVID on last admission. COA referral. CHF-continues with lasix gtt & dobutamine gtt. Now requiring 8 L O2. Will follow for DCP needs.

## 2023-12-07 NOTE — PROGRESS NOTES
Cardiology Consult Service  Daily Progress Note        Admit Date:  12/3/2023  Primary cardiologist: Dr Lynette Orozco, new    Reason for Consultation/Chief Complaint: AHF    Subjective:     Sylvester Owens is a 79 y.o. male with a past medical history of diffuse B-cell lymphoma with extensive thromboembolic disease (PE and DVT) currently on Xarelto, COVID infection about 3 weeks ago, currently on 3 L of supplemental oxygen, poor historian, HTN, DM, nursing home resident, DNR status. Echo 06/2020: Normal LV, LVEF 60%, mild LVH, grade 1 diastolic dysfunction, normal RV and valves. Patient was brought to the emergency room on 12/3 due to altered mental status/confusion and hypoxia. CTA chest negative for PE however suggestive of pulmonary edema. ECG consistent with NSR with frequent PACs, poor R wave progression. Patient was placed on 5 L of supplemental oxygen. BP was normal.  Creatinine 0.9, proBNP 500, HS trop 37. Patient was admitted for acute heart failure. He was placed on IV Lasix 40 twice daily. Cardiology was consulted. Today increased oxygen requirements to 8 liters, no I/O or weights recorded. Echo 12/5/2023: TDS, likely normal LVEF 55%, indeterminate diastolic function, mild MR. Interval history:  Patient remains on 10 liters of oxygen. BP low hence lasix drip was held earlier.      Objective:     Medications:   midodrine  10 mg Oral TID WC    insulin lispro  0-4 Units SubCUTAneous TID WC    insulin lispro  0-4 Units SubCUTAneous Nightly    sodium chloride flush  5-40 mL IntraVENous 2 times per day    therapeutic multivitamin-minerals  1 tablet Oral Daily    rivaroxaban  20 mg Oral Dinner    vitamin C  500 mg Oral Daily    spironolactone  25 mg Oral Daily       IV drips:   DOBUTamine 2.5 mcg/kg/min (12/06/23 1151)    dextrose      sodium chloride      furosemide (LASIX) 100 mg in sodium chloride 0.9 % 100 mL infusion 10 mg/hr (12/07/23 0140)       PRN:  glucose, dextrose bolus **OR**

## 2023-12-07 NOTE — PROGRESS NOTES
Speech Language Pathology  Facility/Department:[facility] 4 PCU  dysphagia treatment note /DC    Name: Vinicius Warren  : 1940  MRN: [id number]    Patient Diagnosis(es):   Patient Active Problem List    Diagnosis Date Noted    Acute heart failure, unspecified heart failure type ([facility]) 2023    Aphasia 2023    Delirium 2023    Acute hypoxemic respiratory failure ([facility]) 2023    DVT, lower extremity, distal, acute, left ([facility]) 2021    Diffuse large B cell lymphoma ([facility]) 2020     Past Medical History:   Diagnosis Date    Non Hodgkin's lymphoma ([facility])     Type D     Past Surgical History:   Procedure Laterality Date    IR PORT PLACEMENT < 5 YEARS      LYMPH NODE BIOPSY       History of Present Illness  Per MD notes:  \" Pt is a 79 y.o. 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 Patient is confused all history is taken from wife and grandson as well as reviewing patient's chart. Patient fell earlier this morning they were unable to get him up. EMS found patient to have sats in the 70s despite being on 3 L placed on nonrebreather. Once here in the ER he was able to be weaned down to 5 L and now after receiving IV Lasix is back to his baseline of 3 L. Patient wife states that he had been sent to the skilled nursing facility and just discharged on the first of this month. Patient was fine yesterday according to the wife back to his normal baseline mentation but now he is confused again. \"    Date of onset: 12/3/23    Current Diet:  ADULT DIET; Dysphagia - Soft and Bite Sized;  Low Sodium (2 gm)    Treatment Diagnosis: dysphagia    Pain:  None

## 2023-12-07 NOTE — PROGRESS NOTES
V2.0    [facility] Progress Note      Name:  Olaf Isabel /Age/Sex: 1940  (79 y.o. male)   MRN & CSN:  [id number] & [id number] Encounter Date/Time: 2023 6:29 AM EST   Location:  [address] [place] [address] PCP: Susu Le MD     Attending:Kishore Eldridge MD       Hospital Day: 5    Assessment and Recommendations       Assessment/Plan: IV diuresis for acute CHF, dobutamine on going     Acute hypoxic respiratory failure: At home patient was found to have sats in the 70s by squad and required nonrebreather at that time. Currently patient is back at baseline 3 L nasal cannula after receiving diuretic in the emergency department. 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    : CTA showed pul edema and pleural effusions no PE as interpreted by me  Patient currently requiring 5 L nasal cannula baseline is 3 L    Pulmonary edema/elevated BNP:  Patient being evaluated for heart failure  He is discussed with ED provider admitting the patient to inpatient  Consult placed to cardiology  Echocardiogram ordered  : I reviewed note from cardiology patient is now on Lasix drip 10 mg/h  Patient with 1.5 L off over the last 24 hours     Intermittent delirium versus new onset dementia:  I have reviewed patient's hospitalization with discharge date   Office visit 2023 at oncology found patient to have normal mental status with good insight at that time. At that time patient was admitted for COVID and increased confusion. Patient was discharged to skilled nursing facility still having intermittent confusion  CT head on admission reviewed by me shows no bleed or midline shift  We will check B12 folate and TSH  : Patient increased behavioral issues during the day yesterday requiring a sitter. Patient was quite confused and very impulsive. Today sitter says patient is better.   But he still very confused     Hyponatremia:  Reviewed all records showed sodium to be 130 at time

## 2023-12-08 LAB
ANION GAP SERPL CALCULATED.3IONS-SCNC: 13 MMOL/L (ref 3–16)
BASOPHILS # BLD: 0 K/UL (ref 0–0.2)
BASOPHILS NFR BLD: 0 %
BUN SERPL-MCNC: 32 MG/DL (ref 7–20)
CALCIUM SERPL-MCNC: 8.5 MG/DL (ref 8.3–10.6)
CHLORIDE SERPL-SCNC: 89 MMOL/L (ref 99–110)
CO2 SERPL-SCNC: 31 MMOL/L (ref 21–32)
CREAT SERPL-MCNC: 1.5 MG/DL (ref 0.8–1.3)
DEPRECATED RDW RBC AUTO: 13.3 % (ref 12.4–15.4)
EOSINOPHIL # BLD: 0.2 K/UL (ref 0–0.6)
EOSINOPHIL NFR BLD: 3 %
GFR SERPLBLD CREATININE-BSD FMLA CKD-EPI: 46 ML/MIN/{1.73_M2}
GLUCOSE BLD-MCNC: 191 MG/DL (ref 70–99)
GLUCOSE BLD-MCNC: 194 MG/DL (ref 70–99)
GLUCOSE BLD-MCNC: 269 MG/DL (ref 70–99)
GLUCOSE BLD-MCNC: 384 MG/DL (ref 70–99)
GLUCOSE SERPL-MCNC: 188 MG/DL (ref 70–99)
HCT VFR BLD AUTO: 33.9 % (ref 40.5–52.5)
HGB BLD-MCNC: 11.5 G/DL (ref 13.5–17.5)
LYMPHOCYTES # BLD: 0.5 K/UL (ref 1–5.1)
LYMPHOCYTES NFR BLD: 10 %
MAGNESIUM SERPL-MCNC: 1.6 MG/DL (ref 1.8–2.4)
MCH RBC QN AUTO: 31.8 PG (ref 26–34)
MCHC RBC AUTO-ENTMCNC: 33.9 G/DL (ref 31–36)
MCV RBC AUTO: 93.8 FL (ref 80–100)
METAMYELOCYTES NFR BLD MANUAL: 1 %
MONOCYTES # BLD: 0.3 K/UL (ref 0–1.3)
MONOCYTES NFR BLD: 6 %
NEUTROPHILS # BLD: 4.3 K/UL (ref 1.7–7.7)
NEUTROPHILS NFR BLD: 80 %
OVALOCYTES BLD QL SMEAR: ABNORMAL
PERFORMED ON: ABNORMAL
PLATELET # BLD AUTO: 250 K/UL (ref 135–450)
PMV BLD AUTO: 8.4 FL (ref 5–10.5)
POTASSIUM SERPL-SCNC: 3.4 MMOL/L (ref 3.5–5.1)
RBC # BLD AUTO: 3.61 M/UL (ref 4.2–5.9)
SODIUM SERPL-SCNC: 133 MMOL/L (ref 136–145)
WBC # BLD AUTO: 5.3 K/UL (ref 4–11)

## 2023-12-08 PROCEDURE — 80048 BASIC METABOLIC PNL TOTAL CA: CPT

## 2023-12-08 PROCEDURE — 2580000003 HC RX 258: Performed by: SURGERY

## 2023-12-08 PROCEDURE — 97110 THERAPEUTIC EXERCISES: CPT

## 2023-12-08 PROCEDURE — 83735 ASSAY OF MAGNESIUM: CPT

## 2023-12-08 PROCEDURE — 85025 COMPLETE CBC W/AUTO DIFF WBC: CPT

## 2023-12-08 PROCEDURE — 6360000002 HC RX W HCPCS: Performed by: INTERNAL MEDICINE

## 2023-12-08 PROCEDURE — 2700000000 HC OXYGEN THERAPY PER DAY

## 2023-12-08 PROCEDURE — 2060000000 HC ICU INTERMEDIATE R&B

## 2023-12-08 PROCEDURE — 94761 N-INVAS EAR/PLS OXIMETRY MLT: CPT

## 2023-12-08 PROCEDURE — 2500000003 HC RX 250 WO HCPCS: Performed by: INTERNAL MEDICINE

## 2023-12-08 PROCEDURE — 99291 CRITICAL CARE FIRST HOUR: CPT | Performed by: INTERNAL MEDICINE

## 2023-12-08 PROCEDURE — 97530 THERAPEUTIC ACTIVITIES: CPT

## 2023-12-08 PROCEDURE — 6370000000 HC RX 637 (ALT 250 FOR IP): Performed by: INTERNAL MEDICINE

## 2023-12-08 PROCEDURE — 97535 SELF CARE MNGMENT TRAINING: CPT

## 2023-12-08 PROCEDURE — 2580000003 HC RX 258: Performed by: INTERNAL MEDICINE

## 2023-12-08 PROCEDURE — 6370000000 HC RX 637 (ALT 250 FOR IP): Performed by: SURGERY

## 2023-12-08 PROCEDURE — 97116 GAIT TRAINING THERAPY: CPT

## 2023-12-08 PROCEDURE — 6360000002 HC RX W HCPCS: Performed by: SURGERY

## 2023-12-08 PROCEDURE — 36415 COLL VENOUS BLD VENIPUNCTURE: CPT

## 2023-12-08 RX ORDER — INSULIN GLARGINE 100 [IU]/ML
10 INJECTION, SOLUTION SUBCUTANEOUS DAILY
Status: DISCONTINUED | OUTPATIENT
Start: 2023-12-08 | End: 2023-12-09

## 2023-12-08 RX ORDER — SPIRONOLACTONE 50 MG/1
50 TABLET, FILM COATED ORAL DAILY
Status: DISCONTINUED | OUTPATIENT
Start: 2023-12-09 | End: 2023-12-11 | Stop reason: HOSPADM

## 2023-12-08 RX ORDER — SPIRONOLACTONE 25 MG/1
25 TABLET ORAL ONCE
Status: COMPLETED | OUTPATIENT
Start: 2023-12-08 | End: 2023-12-08

## 2023-12-08 RX ADMIN — OXYCODONE HYDROCHLORIDE AND ACETAMINOPHEN 500 MG: 500 TABLET ORAL at 09:39

## 2023-12-08 RX ADMIN — DOBUTAMINE HYDROCHLORIDE 2.5 MCG/KG/MIN: 200 INJECTION INTRAVENOUS at 03:34

## 2023-12-08 RX ADMIN — SPIRONOLACTONE 25 MG: 25 TABLET ORAL at 09:40

## 2023-12-08 RX ADMIN — INSULIN LISPRO 2 UNITS: 100 INJECTION, SOLUTION INTRAVENOUS; SUBCUTANEOUS at 09:40

## 2023-12-08 RX ADMIN — RIVAROXABAN 20 MG: 20 TABLET, FILM COATED ORAL at 17:23

## 2023-12-08 RX ADMIN — MIDODRINE HYDROCHLORIDE 10 MG: 5 TABLET ORAL at 17:23

## 2023-12-08 RX ADMIN — MIDODRINE HYDROCHLORIDE 10 MG: 5 TABLET ORAL at 09:39

## 2023-12-08 RX ADMIN — MIDODRINE HYDROCHLORIDE 10 MG: 5 TABLET ORAL at 11:09

## 2023-12-08 RX ADMIN — Medication 1 TABLET: at 09:39

## 2023-12-08 RX ADMIN — BUMETANIDE 0.5 MG/HR: 0.25 INJECTION INTRAMUSCULAR; INTRAVENOUS at 11:02

## 2023-12-08 RX ADMIN — INSULIN GLARGINE 10 UNITS: 100 INJECTION, SOLUTION SUBCUTANEOUS at 12:01

## 2023-12-08 RX ADMIN — INSULIN LISPRO 4 UNITS: 100 INJECTION, SOLUTION INTRAVENOUS; SUBCUTANEOUS at 11:12

## 2023-12-08 RX ADMIN — FUROSEMIDE 10 MG/HR: 10 INJECTION, SOLUTION INTRAMUSCULAR; INTRAVENOUS at 07:41

## 2023-12-08 RX ADMIN — SODIUM CHLORIDE, PRESERVATIVE FREE 10 ML: 5 INJECTION INTRAVENOUS at 09:40

## 2023-12-08 ASSESSMENT — PAIN SCALES - GENERAL
PAINLEVEL_OUTOF10: 0
PAINLEVEL_OUTOF10: 0

## 2023-12-08 NOTE — PROGRESS NOTES
Occupational Therapy  Facility/Department: [place] [address] PCU  Occupational Therapy Progress Note    Name: Zaheer Blanco  : 1940  MRN: [id number]  Date of Service: 2023    Discharge Recommendations:  Zaheer Blanco scored a 17/24 on the AM-PAC ADL Inpatient form. Current research shows that an AM-PAC score of 18 or greater is typically associated with a discharge to the patient's home setting. Based on the patient's AM-PAC score, and their current ADL deficits, it is recommended that the patient have 2-3 sessions per week of Occupational Therapy at d/c to increase the patient's independence. At this time, this patient demonstrates the endurance and safety to discharge home with home OT and a follow up treatment frequency of 2-3x/wk. Please see assessment section for further patient specific details. HOME HEALTH CARE: LEVEL 1 STANDARD    - Initial home health evaluation to occur within 24-48 hours, in patient home   - Therapy to evaluate with goal of regaining prior level of functioning   - Therapy to evaluate if patient has [address] needs for personal care   If patient discharges prior to next session this note will serve as a discharge summary. Please see below for the latest assessment towards goals. Patient would benefit from continued therapy after discharge, 24 hour supervision or assist, Home with Home health OT, 3-5 sessions per week  OT Equipment Recommendations  Other: if home with family- recommend shower chair if doesn't already have       Patient Diagnosis(es): The primary encounter diagnosis was Acute pulmonary edema ([address]). Diagnoses of Altered mental status, unspecified altered mental status type and Acute heart failure, unspecified heart failure type [facility]) were also pertinent to this visit. Past Medical History:  has a past medical history of Non Hodgkin's lymphoma ([address]).   Past Surgical History:  has a past surgical history that includes lymph node biopsy and IR PORT

## 2023-12-08 NOTE — PROGRESS NOTES
Physician Progress Note      PATIENT:               Colleen Watson  CSN #:                  [id number]  :                       1940  ADMIT DATE:       12/3/2023 4:08 AM  DISCH DATE:  RESPONDING  PROVIDER #:        Lincoln Landau MD          QUERY TEXT:    Pt admitted with Acute CHF. Pt noted to have increased confusion, increase   behavioral issues during the day requiring a sitte. If possible, please   document in the progress notes and discharge summary if you are evaluating and   / or treating any of the following: The medical record reflects the following:  Risk Factors: 78 y/o male with Acute on chronic diastolic CHF  Clinical Indicators: Per  PN: \"Intermittent delirium versus new onset   dementia: I have reviewed patient's hospitalization with discharge date :   Office visit 2023? at oncology found patient to have normal mental status   with good insight at that time. At that time patient was admitted for COVID   and increased confusion. Patient was discharged to skilled nursing facility   still having intermittent confusion CT head on admission reviewed by me shows   no bleed or midline shift We will check B12 folate and TSH : Patient   increased behavioral issues during the day yest  Treatment: CT Head, Labs, sitter at bedside, bed/chair alarm, Camera  Options provided:  -- Acute Metabolic encephalopathy due to Acute CHF  -- Dementia with behavioral disturbances  -- Other - I will add my own diagnosis  -- Disagree - Not applicable / Not valid  -- Disagree - Clinically unable to determine / Unknown  -- Refer to Clinical Documentation Reviewer    PROVIDER RESPONSE TEXT:    This patient has metabolic encephalopathy.     Query created by: Maura Zuleta on 2023 1:22 PM      Electronically signed by:  Lincoln Landau MD 2023 1:25 PM

## 2023-12-08 NOTE — PROGRESS NOTES
Physical Therapy  Facility/Department: [place] [address] PCU  Daily Treatment Note  NAME: Geoff Andre  : 1940  MRN: [id number]    Date of Service: 2023    Discharge Recommendations: Geoff Andre scored a 18/24 on the AM-PAC short mobility form. Current research shows that an AM-PAC score of 18 or greater is typically associated with a discharge to the patient's home setting. Based on the patient's AM-PAC score and their current functional mobility deficits, it is recommended that the patient have 2-3 sessions per week of Physical Therapy at d/c to increase the patient's independence. At this time, this patient demonstrates the endurance and safety to discharge home with home PT and a follow up treatment frequency of 2-3x/wk. Please see assessment section for further patient specific details. If patient discharges prior to next session this note will serve as a discharge summary. Please see below for the latest assessment towards goals. Home with Home health PT, 24 hour supervision or assist   PT Equipment Recommendations  Equipment Needed: No  Other: may benefit from RW for longer distances if does not own one    Patient Diagnosis(es): The primary encounter diagnosis was Acute pulmonary edema ([address] Diagnoses of Altered mental status, unspecified altered mental status type and Acute heart failure, unspecified heart failure type [facility]) were also pertinent to this visit. Pt hx: 79 y.o. 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 Patient was brought to the emergency room on 12/3 due to altered mental status/confusion and hypoxia. CTA chest negative for PE however suggestive of pulmonary edema. ECG consistent with NSR with frequent PACs, poor R wave progression.  Patient was placed on 5 L

## 2023-12-08 NOTE — PROGRESS NOTES
V2.0    [facility] Progress Note      Name:  Leopoldo Chavarria /Age/Sex: 1940  (79 y.o. male)   MRN & CSN:  [id number] & [id number] Encounter Date/Time: 2023 6:29 AM EST   Location:  [address] [place], [place] [address]- PCP: Sera Calderón MD     Attending:Lalo Eldridge MD       Hospital Day: 6    Assessment and Recommendations       Assessment/Plan: IV diuresis for acute CHF, dobutamine on going     Acute hypoxic respiratory failure: At home patient was found to have sats in the 70s by squad and required nonrebreather at that time. Currently patient is back at baseline 3 L nasal cannula after receiving diuretic in the emergency department. 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    : CTA showed pul edema and pleural effusions no PE as interpreted by me  Patient currently requiring 5 L nasal cannula baseline is 3 L    Pulmonary edema/elevated BNP:  Patient being evaluated for heart failure  He is discussed with ED provider admitting the patient to inpatient  Consult placed to cardiology  Echocardiogram ordered  : I reviewed note from cardiology patient is now on Lasix drip 10 mg/h  Patient with 1.5 L off over the last 24 hours     Intermittent delirium versus new onset dementia:  I have reviewed patient's hospitalization with discharge date   Office visit 2023 at oncology found patient to have normal mental status with good insight at that time. At that time patient was admitted for COVID and increased confusion. Patient was discharged to skilled nursing facility still having intermittent confusion  CT head on admission reviewed by me shows no bleed or midline shift  We will check B12 folate and TSH  : Patient increased behavioral issues during the day yesterday requiring a sitter. Patient was quite confused and very impulsive. Today sitter says patient is better.   But he still very confused     Hyponatremia:  Reviewed all records showed sodium to be 130 at time

## 2023-12-08 NOTE — PROGRESS NOTES
18 g peripheral IV in L arm developed bruising around insertion site, no redness/ warmth present. Pt reports slight pain upon palpation of insertion site. Line pulled back, no blood return noted. IV removed and dobutamine gtt switched to 20g IV. 22g inserted in R forearm with lasix gtt infusing.

## 2023-12-08 NOTE — PROGRESS NOTES
Cardiology Consult Service  Daily Progress Note        Admit Date:  12/3/2023  Primary cardiologist: Dr Kandy Lundy, new    Reason for Consultation/Chief Complaint: AHF    Subjective:     Tenisha Ochoa is a 79 y.o. male with a past medical history of diffuse B-cell lymphoma with extensive thromboembolic disease (PE and DVT) currently on Xarelto, COVID infection about 3 weeks ago, currently on 3 L of supplemental oxygen, poor historian, HTN, DM, nursing home resident, DNR status. Echo 06/2020: Normal LV, LVEF 60%, mild LVH, grade 1 diastolic dysfunction, normal RV and valves. Patient was brought to the emergency room on 12/3 due to altered mental status/confusion and hypoxia. CTA chest negative for PE however suggestive of pulmonary edema. ECG consistent with NSR with frequent PACs, poor R wave progression. Patient was placed on 5 L of supplemental oxygen. BP was normal.  Creatinine 0.9, proBNP 500, HS trop 37. Patient was admitted for acute heart failure. He was placed on IV Lasix 40 twice daily. Cardiology was consulted. Today increased oxygen requirements to 8 liters, no I/O or weights recorded. Echo 12/5/2023: TDS, likely normal LVEF 55%, indeterminate diastolic function, mild MR. Interval history:  Patient with slight improvement in oxygen requirements (currently 5 L). Creatinine stable 1.5, I's and O's even, weight down 2 pounds.   BP low normal.    Objective:     Medications:   [START ON 12/9/2023] spironolactone  50 mg Oral Daily    insulin glargine  10 Units SubCUTAneous Daily    midodrine  10 mg Oral TID WC    insulin lispro  0-4 Units SubCUTAneous TID WC    insulin lispro  0-4 Units SubCUTAneous Nightly    sodium chloride flush  5-40 mL IntraVENous 2 times per day    therapeutic multivitamin-minerals  1 tablet Oral Daily    rivaroxaban  20 mg Oral Dinner    vitamin C  500 mg Oral Daily       IV drips:   bumetanide (BUMEX) 12.5 mg in sodium chloride 0.9 % 125 mL infusion 0.5 mg/hr

## 2023-12-08 NOTE — DISCHARGE INSTR - COC
Mobility/ADLs:  Walking   Assisted  Transfer  Assisted  Bathing  Assisted  Dressing  Assisted  Toileting  Assisted  Feeding  Assisted  Med Admin  Assisted  Med Delivery   crushed with pudding    Wound Care Documentation and Therapy:        Elimination:  Continence: Bowel: No  Bladder: No  Urinary Catheter: None   Colostomy/Ileostomy/Ileal Conduit: No       Date of Last BM: 12/10/23    Intake/Output Summary (Last 24 hours) at 12/8/2023 0045  Last data filed at 12/7/2023 2305  Gross per 24 hour   Intake 952 ml   Output 1550 ml   Net -598 ml     I/O last 3 completed shifts: In: 4051 [P.O.:1442]  Out: 9756 [Urine:1125]    Safety Concerns:     History of Falls (last 30 days)    Impairments/Disabilities:      Hearing    Nutrition Therapy:  Current Nutrition Therapy:   - Oral Diet:  General    Routes of Feeding: Oral  Liquids: No Restrictions  Daily Fluid Restriction: no  Last Modified Barium Swallow with Video (Video Swallowing Test): done on 12/04    Treatments at the Time of Hospital Discharge:   Respiratory Treatments: ***  Oxygen Therapy:  is on oxygen at 3 L/min per nasal cannula. Ventilator:    - No ventilator support    Heart Failure Instructions for Daily Management  Patient was treated for acute diastolic heart failure. he  will require the following:    Please weigh daily on the same scale and approximately the same time of day. Report weight gain of 3 pounds/day or 5 pounds/week to : facility MD, Rae Thomson [phone], and [facility] / Faraz Mo [phone]. Please use hospital discharge weight as baseline reference. Please monitor for signs and symptoms of and report to MD:  Worsening Heart Failure: sudden weight gain, shortness of breath, lower extremity or general edema/swelling, abdominal bloating/swelling, inability to lie flat, intolerance to usual activity, or cough (especially at night). Report these finding even if no increase in weight.   Dehydration:  having difficulty or a decrease in urination, dizziness, worsening fatigue, or new onset/worsening of generalized weakness. Please continue a LOW SODIUM diet and LIMIT fluid intake to 48 - 64 ounces ( 1.5 - 2 liters) per day. Call [facility] -[phone], facility MD, and [facility] / Marley Pearson [phone] with any questions or concerns. Please continue heart failure education to patient and family/support system. See After Visit Summary for hospital follow up appointment details. Consider spiritual care referral for support and/or completion of advance directives . Consider: having the facility MD complete required 7 day follow up, [facility] telehealth program if patient agreeable and able to participate, and palliative care consult for ongoing goals of care, end of life, and/or chronic disease management discussions. Patient's primary cardiologist is Dr Carmita East.   PLEASE DRAW BMP ON 12/17 & FAX TO [place] [address] [address] ([phone]) RESULTS TO BE REVIEWED AT FOLLOW-UP         Rehab Therapies: Physical Therapy and Occupational Therapy  Weight Bearing Status/Restrictions: No weight bearing restrictions  Other Medical Equipment (for information only, NOT a DME order):  walker  Other Treatments: ***    Patient's personal belongings (please select all that are sent with patient):  None    RN SIGNATURE:  Electronically signed by Grecia Johnson RN on 12/11/23 at 11:18 AM EST    CASE MANAGEMENT/SOCIAL WORK SECTION    Inpatient Status Date: ***    Readmission Risk Assessment Score:  Readmission Risk              Risk of Unplanned Readmission:  21           Discharging to Facility/ Agency   Name:   Address:  Phone:  Fax:    Dialysis Facility (if applicable)   Name:  Address:  Dialysis Schedule:  Phone:  Fax:    / signature: {Esignature:[id number]}    PHYSICIAN SECTION    Prognosis: Guarded    Condition at Discharge: Stable    Rehab Potential

## 2023-12-09 LAB
ANION GAP SERPL CALCULATED.3IONS-SCNC: 13 MMOL/L (ref 3–16)
BUN SERPL-MCNC: 32 MG/DL (ref 7–20)
CALCIUM SERPL-MCNC: 8.6 MG/DL (ref 8.3–10.6)
CHLORIDE SERPL-SCNC: 88 MMOL/L (ref 99–110)
CO2 SERPL-SCNC: 34 MMOL/L (ref 21–32)
CREAT SERPL-MCNC: 1.5 MG/DL (ref 0.8–1.3)
GFR SERPLBLD CREATININE-BSD FMLA CKD-EPI: 46 ML/MIN/{1.73_M2}
GLUCOSE BLD-MCNC: 189 MG/DL (ref 70–99)
GLUCOSE BLD-MCNC: 219 MG/DL (ref 70–99)
GLUCOSE BLD-MCNC: 226 MG/DL (ref 70–99)
GLUCOSE BLD-MCNC: 255 MG/DL (ref 70–99)
GLUCOSE SERPL-MCNC: 203 MG/DL (ref 70–99)
MAGNESIUM SERPL-MCNC: 1.6 MG/DL (ref 1.8–2.4)
NT-PROBNP SERPL-MCNC: 468 PG/ML (ref 0–449)
PERFORMED ON: ABNORMAL
POTASSIUM SERPL-SCNC: 3.2 MMOL/L (ref 3.5–5.1)
SODIUM SERPL-SCNC: 135 MMOL/L (ref 136–145)

## 2023-12-09 PROCEDURE — 6370000000 HC RX 637 (ALT 250 FOR IP): Performed by: SURGERY

## 2023-12-09 PROCEDURE — 2580000003 HC RX 258: Performed by: INTERNAL MEDICINE

## 2023-12-09 PROCEDURE — 6360000002 HC RX W HCPCS: Performed by: INTERNAL MEDICINE

## 2023-12-09 PROCEDURE — 94761 N-INVAS EAR/PLS OXIMETRY MLT: CPT

## 2023-12-09 PROCEDURE — 6370000000 HC RX 637 (ALT 250 FOR IP): Performed by: INTERNAL MEDICINE

## 2023-12-09 PROCEDURE — 36415 COLL VENOUS BLD VENIPUNCTURE: CPT

## 2023-12-09 PROCEDURE — 80048 BASIC METABOLIC PNL TOTAL CA: CPT

## 2023-12-09 PROCEDURE — 83735 ASSAY OF MAGNESIUM: CPT

## 2023-12-09 PROCEDURE — 2700000000 HC OXYGEN THERAPY PER DAY

## 2023-12-09 PROCEDURE — 83880 ASSAY OF NATRIURETIC PEPTIDE: CPT

## 2023-12-09 PROCEDURE — 2060000000 HC ICU INTERMEDIATE R&B

## 2023-12-09 PROCEDURE — 99233 SBSQ HOSP IP/OBS HIGH 50: CPT | Performed by: NURSE PRACTITIONER

## 2023-12-09 PROCEDURE — 2500000003 HC RX 250 WO HCPCS: Performed by: INTERNAL MEDICINE

## 2023-12-09 PROCEDURE — 6360000002 HC RX W HCPCS: Performed by: NURSE PRACTITIONER

## 2023-12-09 RX ORDER — INSULIN GLARGINE 100 [IU]/ML
10 INJECTION, SOLUTION SUBCUTANEOUS DAILY
Status: DISCONTINUED | OUTPATIENT
Start: 2023-12-10 | End: 2023-12-11 | Stop reason: HOSPADM

## 2023-12-09 RX ORDER — INSULIN GLARGINE 100 [IU]/ML
5 INJECTION, SOLUTION SUBCUTANEOUS ONCE
Status: DISCONTINUED | OUTPATIENT
Start: 2023-12-09 | End: 2023-12-09

## 2023-12-09 RX ORDER — INSULIN GLARGINE 100 [IU]/ML
15 INJECTION, SOLUTION SUBCUTANEOUS DAILY
Status: DISCONTINUED | OUTPATIENT
Start: 2023-12-10 | End: 2023-12-09

## 2023-12-09 RX ORDER — MAGNESIUM SULFATE IN WATER 40 MG/ML
4000 INJECTION, SOLUTION INTRAVENOUS ONCE
Status: COMPLETED | OUTPATIENT
Start: 2023-12-09 | End: 2023-12-09

## 2023-12-09 RX ADMIN — SODIUM CHLORIDE, PRESERVATIVE FREE 10 ML: 5 INJECTION INTRAVENOUS at 21:24

## 2023-12-09 RX ADMIN — MIDODRINE HYDROCHLORIDE 10 MG: 5 TABLET ORAL at 11:55

## 2023-12-09 RX ADMIN — ACETAMINOPHEN 650 MG: 325 TABLET ORAL at 04:13

## 2023-12-09 RX ADMIN — MIDODRINE HYDROCHLORIDE 10 MG: 5 TABLET ORAL at 17:12

## 2023-12-09 RX ADMIN — SPIRONOLACTONE 50 MG: 50 TABLET ORAL at 08:29

## 2023-12-09 RX ADMIN — Medication 1 TABLET: at 08:29

## 2023-12-09 RX ADMIN — DOBUTAMINE HYDROCHLORIDE 2.5 MCG/KG/MIN: 200 INJECTION INTRAVENOUS at 14:15

## 2023-12-09 RX ADMIN — INSULIN GLARGINE 10 UNITS: 100 INJECTION, SOLUTION SUBCUTANEOUS at 08:30

## 2023-12-09 RX ADMIN — INSULIN LISPRO 1 UNITS: 100 INJECTION, SOLUTION INTRAVENOUS; SUBCUTANEOUS at 08:30

## 2023-12-09 RX ADMIN — SODIUM CHLORIDE, PRESERVATIVE FREE 10 ML: 5 INJECTION INTRAVENOUS at 08:31

## 2023-12-09 RX ADMIN — MIDODRINE HYDROCHLORIDE 10 MG: 5 TABLET ORAL at 08:29

## 2023-12-09 RX ADMIN — MAGNESIUM SULFATE HEPTAHYDRATE 4000 MG: 40 INJECTION, SOLUTION INTRAVENOUS at 13:21

## 2023-12-09 RX ADMIN — BUMETANIDE 0.5 MG/HR: 0.25 INJECTION INTRAMUSCULAR; INTRAVENOUS at 05:18

## 2023-12-09 RX ADMIN — INSULIN LISPRO 2 UNITS: 100 INJECTION, SOLUTION INTRAVENOUS; SUBCUTANEOUS at 11:55

## 2023-12-09 RX ADMIN — OXYCODONE HYDROCHLORIDE AND ACETAMINOPHEN 500 MG: 500 TABLET ORAL at 08:29

## 2023-12-09 RX ADMIN — RIVAROXABAN 20 MG: 20 TABLET, FILM COATED ORAL at 17:12

## 2023-12-09 RX ADMIN — POTASSIUM BICARBONATE 40 MEQ: 782 TABLET, EFFERVESCENT ORAL at 13:22

## 2023-12-09 ASSESSMENT — PAIN DESCRIPTION - ORIENTATION: ORIENTATION: LEFT;RIGHT

## 2023-12-09 ASSESSMENT — PAIN SCALES - GENERAL
PAINLEVEL_OUTOF10: 0
PAINLEVEL_OUTOF10: 3

## 2023-12-09 ASSESSMENT — PAIN DESCRIPTION - LOCATION: LOCATION: LEG

## 2023-12-09 ASSESSMENT — PAIN DESCRIPTION - PAIN TYPE: TYPE: ACUTE PAIN

## 2023-12-09 ASSESSMENT — PAIN DESCRIPTION - ONSET: ONSET: ON-GOING

## 2023-12-09 ASSESSMENT — PAIN - FUNCTIONAL ASSESSMENT: PAIN_FUNCTIONAL_ASSESSMENT: ACTIVITIES ARE NOT PREVENTED

## 2023-12-09 ASSESSMENT — PAIN DESCRIPTION - DESCRIPTORS: DESCRIPTORS: ACHING

## 2023-12-09 ASSESSMENT — PAIN DESCRIPTION - FREQUENCY: FREQUENCY: CONTINUOUS

## 2023-12-09 NOTE — PROGRESS NOTES
Height:             Physical Exam:      General: NAD   Confused elderly male laying in bed on  5 L nasal cannula  Eyes: Clear nonicteric   ENT: neck supple trach midline  Cardiovascular: Regular rate. Respiratory: crackles bilaterally   Gastrointestinal: Soft, non tender  BS Positive  Genitourinary: no suprapubic tenderness  Musculoskeletal:  edema none  Skin: warm, dry  Neuro: A & O: Self only  Psych: Mood appropriate.          Medications:   Medications:    [START ON 12/10/2023] insulin glargine  10 Units SubCUTAneous Daily    magnesium sulfate  4,000 mg IntraVENous Once    spironolactone  50 mg Oral Daily    midodrine  10 mg Oral TID WC    insulin lispro  0-4 Units SubCUTAneous TID WC    insulin lispro  0-4 Units SubCUTAneous Nightly    sodium chloride flush  5-40 mL IntraVENous 2 times per day    therapeutic multivitamin-minerals  1 tablet Oral Daily    rivaroxaban  20 mg Oral Dinner    vitamin C  500 mg Oral Daily      Infusions:    bumetanide (BUMEX) 12.5 mg in sodium chloride 0.9 % 125 mL infusion 0.5 mg/hr (12/09/23 0518)    DOBUTamine 2.5 mcg/kg/min (12/09/23 1415)    dextrose      sodium chloride       PRN Meds: glucose, 4 tablet, PRN  dextrose bolus, 125 mL, PRN   Or  dextrose bolus, 250 mL, PRN  glucagon (rDNA), 1 mg, PRN  dextrose, , Continuous PRN  sodium chloride flush, 5-40 mL, PRN  sodium chloride, , PRN  ondansetron, 4 mg, Q8H PRN   Or  ondansetron, 4 mg, Q6H PRN  polyethylene glycol, 17 g, Daily PRN  acetaminophen, 650 mg, Q6H PRN   Or  acetaminophen, 650 mg, Q6H PRN  potassium chloride, 40 mEq, PRN   Or  potassium alternative oral replacement, 40 mEq, PRN   Or  potassium chloride, 10 mEq, PRN  magnesium sulfate, 2,000 mg, PRN  aluminum & magnesium hydroxide-simethicone, 30 mL, Q6H PRN  albuterol, 2.5 mg, Q6H PRN        Labs and Imaging   CTA CHEST W CONTRAST    Result Date: 12/3/2023  CLINICAL HISTORY: Acute Hypoxia in pt with hx unprovoked PE while on Eliquis and recent COVID infection EXAM: CT Pulmonary Angiogram with and without contrast TECHNIQUE: Contiguous axial images were obtained from the lung apices through the bases after the administration of 80 mL of Isovue-370. Noncontrast images were obtained. Multiplanar reconstructions were utilized. Maximum intensity projection images were evaluated. Up-to-date CT equipment and radiation dose reduction techniques were employed. COMPARISON: Chest x-ray 12/3/2023. PET/CT 8/1/2022. Chest CT 8/1/2022. Delorise Pillar FINDINGS: Vasculature: Good opacification of the pulmonary arteries. Limited exam due to extensive breathing motion artifact. No filling defect identified to the level of the segmental pulmonary arteries. Subsegmental emboli would be difficult to exclude. Lungs and pleura: Bilateral pleural effusions. Diffuse bilateral airspace disease with groundglass opacity and septal thickening most likely related to pulmonary edema versus atypical pneumonia. Mediastinum: No mediastinal lymphadenopathy. No hilar lymphadenopathy. Normal heart size. No pericardial effusion. Small hiatal hernia. Neck and chest wall: No axillary or supraclavicular lymphadenopathy identified. Right upper chest power injectable port with tip in the lower superior vena cava. Upper abdomen: Liver and spleen are unremarkable. No pancreatic abnormality. Gallbladder is unremarkable. Bones: No suspicious osseous abnormality. 1. Breathing motion artifact limits overall evaluation. No evidence of pulmonary thromboembolism to the level of the segmental pulmonary arteries. Distal evaluation is limited. 2. Bilateral effusions and extensive bilateral airspace disease with areas of septal thickening most likely related to pulmonary edema versus atypical pneumonia. CT HEAD WO CONTRAST    Result Date: 12/3/2023  CT HEAD WITHOUT CONTRAST HISTORY: Altered mental status PROCEDURE: Noncontrast CT the brain performed with 5 mm contiguous sections.  Individualized dose optimization technique was used in

## 2023-12-09 NOTE — PROGRESS NOTES
Cardiology - PROGRESS NOTE    Admit Date: 12/3/2023     Chief Complaint: Acute on chronic diastolic CHF     Interval History:   Patient seen and examined and notes reviewed. Patient is being followed for acute on chronic diastolic CHF. Patient had been admitted due to altered mental status, confusion and hypoxia. He had a CTA of his chest that was negative for PE however was suggestive of pulmonary edema. He was placed on supplemental oxygen. He initially was placed on IV Lasix 40 mg twice daily. His oxygen requirements went up to 8 L. He was switched from IV Lasix to a Bumex drip. He is also on dobutamine. He is diuresed 5.2 L. He feels like his breathing is better.     In: 840.3 [P.O.:360; I.V.:480.3]  Out: 2200    Wt Readings from Last 2 Encounters:   12/09/23 87.1 kg (192 lb 0.3 oz)   11/20/23 95.9 kg (211 lb 6.7 oz)       Data:   Scheduled Meds:   Scheduled Meds:   spironolactone  50 mg Oral Daily    insulin glargine  10 Units SubCUTAneous Daily    midodrine  10 mg Oral TID WC    insulin lispro  0-4 Units SubCUTAneous TID WC    insulin lispro  0-4 Units SubCUTAneous Nightly    sodium chloride flush  5-40 mL IntraVENous 2 times per day    therapeutic multivitamin-minerals  1 tablet Oral Daily    rivaroxaban  20 mg Oral Dinner    vitamin C  500 mg Oral Daily     Continuous Infusions:   bumetanide (BUMEX) 12.5 mg in sodium chloride 0.9 % 125 mL infusion 0.5 mg/hr (12/09/23 0518)    DOBUTamine 2.5 mcg/kg/min (12/08/23 0334)    dextrose      sodium chloride       PRN Meds:.glucose, dextrose bolus **OR** dextrose bolus, glucagon (rDNA), dextrose, sodium chloride flush, sodium chloride, ondansetron **OR** ondansetron, polyethylene glycol, acetaminophen **OR** acetaminophen, potassium chloride **OR** potassium alternative oral replacement **OR** potassium chloride, magnesium sulfate, aluminum & magnesium hydroxide-simethicone, albuterol  Continuous ecchymosis. Neck: no JVD, supple, symmetrical, trachea midline   Lungs: , no accessory muscle use, no respiratory distress  Heart: RRR  Abdomen: soft, non-tender; bowel sounds normal  Extremities: No edema, DP +  Psychiatric: normal insight and affect    Patient Active Problem List:     Diffuse large B cell lymphoma (HCC)     DVT, lower extremity, distal, acute, left (HCC)     Acute hypoxemic respiratory failure (HCC)     Aphasia     Delirium     Acute heart failure, unspecified heart failure type (HCC)        Assessment & Plan:      Acute dCHF  On bumex gtt  Hypokalemia  Hypomagnesemia  HTN  H/o PE/DVT  On [address]      80 y/o man with a h/o HTN, DM, diffuse B-cell lymphoma with extensive thromboembolic disease, s/p PE/DVT on Xarelto, recent COVID infection on 3 L of O2 who p/w AMS, confusion, hypoxia, CTA suggestive of pulmonary edema, , Hstrop 37-39. Acute dCHF  - EF 55%  - On bumex gtt at 5 mL/hr - neg 5.2 L  - On dobutamine 2.5mcg/kg/min  - Neg 4.4L  - On spironolactone 50 mg daily  - Daily weight, I&O  - Keep K+ > 4.0 and Mg > 2.0  - Will check BMP, Mg    HTN  - BP soft  - On midodrine 10 mg TID    H/o DVT/PE  - On Xarelto 20 mg QD - H&H stable    Harris Shoulders CNP  [address]    I spent a total of 51 minutes in care of the patient and greater than 50% of the time was spent counseling with Vinicius Warren and coordinating care regarding their diagnosis, treatments and plan of care.

## 2023-12-10 LAB
ANION GAP SERPL CALCULATED.3IONS-SCNC: 13 MMOL/L (ref 3–16)
BUN SERPL-MCNC: 32 MG/DL (ref 7–20)
CALCIUM SERPL-MCNC: 9.1 MG/DL (ref 8.3–10.6)
CHLORIDE SERPL-SCNC: 86 MMOL/L (ref 99–110)
CO2 SERPL-SCNC: 36 MMOL/L (ref 21–32)
CREAT SERPL-MCNC: 1.4 MG/DL (ref 0.8–1.3)
GFR SERPLBLD CREATININE-BSD FMLA CKD-EPI: 50 ML/MIN/{1.73_M2}
GLUCOSE BLD-MCNC: 190 MG/DL (ref 70–99)
GLUCOSE BLD-MCNC: 224 MG/DL (ref 70–99)
GLUCOSE BLD-MCNC: 246 MG/DL (ref 70–99)
GLUCOSE BLD-MCNC: 303 MG/DL (ref 70–99)
GLUCOSE BLD-MCNC: 487 MG/DL (ref 70–99)
GLUCOSE SERPL-MCNC: 220 MG/DL (ref 70–99)
MAGNESIUM SERPL-MCNC: 2.1 MG/DL (ref 1.8–2.4)
PERFORMED ON: ABNORMAL
POTASSIUM SERPL-SCNC: 3.5 MMOL/L (ref 3.5–5.1)
SODIUM SERPL-SCNC: 135 MMOL/L (ref 136–145)

## 2023-12-10 PROCEDURE — 6370000000 HC RX 637 (ALT 250 FOR IP): Performed by: SURGERY

## 2023-12-10 PROCEDURE — 36415 COLL VENOUS BLD VENIPUNCTURE: CPT

## 2023-12-10 PROCEDURE — 6370000000 HC RX 637 (ALT 250 FOR IP): Performed by: NURSE PRACTITIONER

## 2023-12-10 PROCEDURE — 83735 ASSAY OF MAGNESIUM: CPT

## 2023-12-10 PROCEDURE — 6370000000 HC RX 637 (ALT 250 FOR IP): Performed by: INTERNAL MEDICINE

## 2023-12-10 PROCEDURE — 2580000003 HC RX 258: Performed by: INTERNAL MEDICINE

## 2023-12-10 PROCEDURE — 80048 BASIC METABOLIC PNL TOTAL CA: CPT

## 2023-12-10 PROCEDURE — 2060000000 HC ICU INTERMEDIATE R&B

## 2023-12-10 PROCEDURE — 99233 SBSQ HOSP IP/OBS HIGH 50: CPT | Performed by: NURSE PRACTITIONER

## 2023-12-10 PROCEDURE — 2500000003 HC RX 250 WO HCPCS: Performed by: INTERNAL MEDICINE

## 2023-12-10 PROCEDURE — 36592 COLLECT BLOOD FROM PICC: CPT

## 2023-12-10 RX ORDER — FUROSEMIDE 40 MG/1
40 TABLET ORAL DAILY
Status: DISCONTINUED | OUTPATIENT
Start: 2023-12-10 | End: 2023-12-11 | Stop reason: HOSPADM

## 2023-12-10 RX ORDER — SIMETHICONE 20 MG/.3ML
40 EMULSION ORAL EVERY 6 HOURS PRN
Status: DISCONTINUED | OUTPATIENT
Start: 2023-12-10 | End: 2023-12-10 | Stop reason: RX

## 2023-12-10 RX ORDER — SIMETHICONE 80 MG
80 TABLET,CHEWABLE ORAL EVERY 6 HOURS PRN
Status: DISCONTINUED | OUTPATIENT
Start: 2023-12-10 | End: 2023-12-11 | Stop reason: HOSPADM

## 2023-12-10 RX ADMIN — OXYCODONE HYDROCHLORIDE AND ACETAMINOPHEN 500 MG: 500 TABLET ORAL at 08:53

## 2023-12-10 RX ADMIN — BUMETANIDE 0.5 MG/HR: 0.25 INJECTION INTRAMUSCULAR; INTRAVENOUS at 05:40

## 2023-12-10 RX ADMIN — MIDODRINE HYDROCHLORIDE 10 MG: 5 TABLET ORAL at 08:52

## 2023-12-10 RX ADMIN — MIDODRINE HYDROCHLORIDE 10 MG: 5 TABLET ORAL at 12:14

## 2023-12-10 RX ADMIN — INSULIN GLARGINE 10 UNITS: 100 INJECTION, SOLUTION SUBCUTANEOUS at 08:52

## 2023-12-10 RX ADMIN — INSULIN LISPRO 1 UNITS: 100 INJECTION, SOLUTION INTRAVENOUS; SUBCUTANEOUS at 16:58

## 2023-12-10 RX ADMIN — INSULIN LISPRO 1 UNITS: 100 INJECTION, SOLUTION INTRAVENOUS; SUBCUTANEOUS at 08:52

## 2023-12-10 RX ADMIN — INSULIN LISPRO 3 UNITS: 100 INJECTION, SOLUTION INTRAVENOUS; SUBCUTANEOUS at 12:14

## 2023-12-10 RX ADMIN — SODIUM CHLORIDE, PRESERVATIVE FREE 10 ML: 5 INJECTION INTRAVENOUS at 08:53

## 2023-12-10 RX ADMIN — FUROSEMIDE 40 MG: 40 TABLET ORAL at 12:14

## 2023-12-10 RX ADMIN — SPIRONOLACTONE 50 MG: 50 TABLET ORAL at 08:52

## 2023-12-10 RX ADMIN — SIMETHICONE 80 MG: 80 TABLET, CHEWABLE ORAL at 13:58

## 2023-12-10 RX ADMIN — RIVAROXABAN 20 MG: 20 TABLET, FILM COATED ORAL at 16:58

## 2023-12-10 RX ADMIN — MIDODRINE HYDROCHLORIDE 10 MG: 5 TABLET ORAL at 16:58

## 2023-12-10 RX ADMIN — Medication 1 TABLET: at 08:53

## 2023-12-10 ASSESSMENT — PAIN SCALES - GENERAL
PAINLEVEL_OUTOF10: 0

## 2023-12-10 NOTE — PROGRESS NOTES
Cardiology - PROGRESS NOTE    Admit Date: 12/3/2023     Chief Complaint: Acute on chronic diastolic CHF     Interval History:   Patient seen and examined and notes reviewed. Patient is being followed for acute on chronic diastolic CHF. Patient had been admitted due to altered mental status, confusion and hypoxia. He had a CTA of his chest that was negative for PE however was suggestive of pulmonary edema. He was placed on supplemental oxygen. He initially was placed on IV Lasix 40 mg twice daily. His oxygen requirements went up to 8 L. He was switched from IV Lasix to a Bumex drip. He is also on dobutamine. He is diuresed 5.2 L. He feels like his breathing is better. Now 100% on 3L. Weight  184#. Neg 6.3 L. Cr stable at 1.4. Patient pleasantly confused. In: 130 [P.O.:120;  I.V.:10]  Out: 2300    Wt Readings from Last 2 Encounters:   12/10/23 83.9 kg (184 lb 15.5 oz)   11/20/23 95.9 kg (211 lb 6.7 oz)       Data:   Scheduled Meds:   Scheduled Meds:   insulin glargine  10 Units SubCUTAneous Daily    spironolactone  50 mg Oral Daily    midodrine  10 mg Oral TID WC    insulin lispro  0-4 Units SubCUTAneous TID WC    insulin lispro  0-4 Units SubCUTAneous Nightly    sodium chloride flush  5-40 mL IntraVENous 2 times per day    therapeutic multivitamin-minerals  1 tablet Oral Daily    rivaroxaban  20 mg Oral Dinner    vitamin C  500 mg Oral Daily     Continuous Infusions:   bumetanide (BUMEX) 12.5 mg in sodium chloride 0.9 % 125 mL infusion 0.5 mg/hr (12/10/23 8040)    DOBUTamine 2.5 mcg/kg/min (12/09/23 1415)    dextrose      sodium chloride       PRN Meds:.glucose, dextrose bolus **OR** dextrose bolus, glucagon (rDNA), dextrose, sodium chloride flush, sodium chloride, ondansetron **OR** ondansetron, polyethylene glycol, acetaminophen **OR** acetaminophen, potassium chloride **OR** potassium alternative oral replacement **OR** potassium chloride,

## 2023-12-10 NOTE — PROGRESS NOTES
V2.0    [facility] Progress Note      Name:  Vinicius Warren /Age/Sex: 1940  (79 y.o. male)   MRN & CSN:  [id number] & [id number] Encounter Date/Time: 12/10/2023 6:29 AM EST   Location:  [facility] PCP: Rigoberto Hargrove MD     Attending:Humberto Eldridge MD       Hospital Day: 8    Assessment and Recommendations       Assessment/Plan: continue IV diuresis 1 more day, patient was recently discharged on 3 liters home o2 after COVID infection; so we may struggle to make much more progress with regard to valerio oxygen requirement. Will diurese through the night and consider transition to po diuretics tomorrow AM.     Acute hypoxic respiratory failure: At home patient was found to have sats in the 70s by squad and required nonrebreather at that time. Currently patient is back at baseline 3 L nasal cannula after receiving diuretic in the emergency department. 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    : CTA showed pul edema and pleural effusions no PE as interpreted by me  Patient currently requiring 5 L nasal cannula baseline is 3 L    Pulmonary edema/elevated BNP:  Patient being evaluated for heart failure  He is discussed with ED provider admitting the patient to inpatient  Consult placed to cardiology  Echocardiogram ordered  : I reviewed note from cardiology patient is now on Lasix drip 10 mg/h  Patient with 1.5 L off over the last 24 hours     Intermittent delirium versus new onset dementia:  I have reviewed patient's hospitalization with discharge date   Office visit 2023 at oncology found patient to have normal mental status with good insight at that time. At that time patient was admitted for COVID and increased confusion.   Patient was discharged to skilled nursing facility still having intermittent confusion  CT head on admission reviewed by me shows no bleed or midline shift  We will check B12 folate and TSH  : Patient increased behavioral issues administration of 80 mL of Isovue-370. Noncontrast images were obtained. Multiplanar reconstructions were utilized. Maximum intensity projection images were evaluated. Up-to-date CT equipment and radiation dose reduction techniques were employed. COMPARISON: Chest x-ray 12/3/2023. PET/CT 8/1/2022. Chest CT 8/1/2022. Kenia Duckworth FINDINGS: Vasculature: Good opacification of the pulmonary arteries. Limited exam due to extensive breathing motion artifact. No filling defect identified to the level of the segmental pulmonary arteries. Subsegmental emboli would be difficult to exclude. Lungs and pleura: Bilateral pleural effusions. Diffuse bilateral airspace disease with groundglass opacity and septal thickening most likely related to pulmonary edema versus atypical pneumonia. Mediastinum: No mediastinal lymphadenopathy. No hilar lymphadenopathy. Normal heart size. No pericardial effusion. Small hiatal hernia. Neck and chest wall: No axillary or supraclavicular lymphadenopathy identified. Right upper chest power injectable port with tip in the lower superior vena cava. Upper abdomen: Liver and spleen are unremarkable. No pancreatic abnormality. Gallbladder is unremarkable. Bones: No suspicious osseous abnormality. 1. Breathing motion artifact limits overall evaluation. No evidence of pulmonary thromboembolism to the level of the segmental pulmonary arteries. Distal evaluation is limited. 2. Bilateral effusions and extensive bilateral airspace disease with areas of septal thickening most likely related to pulmonary edema versus atypical pneumonia. CT HEAD WO CONTRAST    Result Date: 12/3/2023  CT HEAD WITHOUT CONTRAST HISTORY: Altered mental status PROCEDURE: Noncontrast CT the brain performed with 5 mm contiguous sections. Individualized dose optimization technique was used in order to meet ALARA standards for radiation dose reduction.   In addition to vendor specific dose reduction algorithms, the  dose reduction techniques

## 2023-12-11 VITALS
WEIGHT: 187.17 LBS | RESPIRATION RATE: 16 BRPM | TEMPERATURE: 97.1 F | DIASTOLIC BLOOD PRESSURE: 70 MMHG | BODY MASS INDEX: 26.2 KG/M2 | SYSTOLIC BLOOD PRESSURE: 107 MMHG | HEART RATE: 76 BPM | OXYGEN SATURATION: 95 % | HEIGHT: 71 IN

## 2023-12-11 PROBLEM — I50.31 ACUTE HEART FAILURE WITH PRESERVED EJECTION FRACTION (HFPEF) (HCC): Status: ACTIVE | Noted: 2023-12-03

## 2023-12-11 PROBLEM — R09.02 HYPOXIA: Status: ACTIVE | Noted: 2023-12-11

## 2023-12-11 LAB
ANION GAP SERPL CALCULATED.3IONS-SCNC: 12 MMOL/L (ref 3–16)
BUN SERPL-MCNC: 42 MG/DL (ref 7–20)
CALCIUM SERPL-MCNC: 9.3 MG/DL (ref 8.3–10.6)
CHLORIDE SERPL-SCNC: 83 MMOL/L (ref 99–110)
CO2 SERPL-SCNC: 34 MMOL/L (ref 21–32)
CREAT SERPL-MCNC: 1.7 MG/DL (ref 0.8–1.3)
GFR SERPLBLD CREATININE-BSD FMLA CKD-EPI: 39 ML/MIN/{1.73_M2}
GLUCOSE BLD-MCNC: 210 MG/DL (ref 70–99)
GLUCOSE BLD-MCNC: 246 MG/DL (ref 70–99)
GLUCOSE SERPL-MCNC: 202 MG/DL (ref 70–99)
MAGNESIUM SERPL-MCNC: 2.1 MG/DL (ref 1.8–2.4)
PERFORMED ON: ABNORMAL
PERFORMED ON: ABNORMAL
POTASSIUM SERPL-SCNC: 3.9 MMOL/L (ref 3.5–5.1)
SODIUM SERPL-SCNC: 129 MMOL/L (ref 136–145)

## 2023-12-11 PROCEDURE — 97116 GAIT TRAINING THERAPY: CPT

## 2023-12-11 PROCEDURE — 80048 BASIC METABOLIC PNL TOTAL CA: CPT

## 2023-12-11 PROCEDURE — 6370000000 HC RX 637 (ALT 250 FOR IP): Performed by: NURSE PRACTITIONER

## 2023-12-11 PROCEDURE — 83735 ASSAY OF MAGNESIUM: CPT

## 2023-12-11 PROCEDURE — 99232 SBSQ HOSP IP/OBS MODERATE 35: CPT | Performed by: NURSE PRACTITIONER

## 2023-12-11 PROCEDURE — 36415 COLL VENOUS BLD VENIPUNCTURE: CPT

## 2023-12-11 PROCEDURE — 6370000000 HC RX 637 (ALT 250 FOR IP): Performed by: INTERNAL MEDICINE

## 2023-12-11 PROCEDURE — 2580000003 HC RX 258: Performed by: INTERNAL MEDICINE

## 2023-12-11 PROCEDURE — 97530 THERAPEUTIC ACTIVITIES: CPT

## 2023-12-11 PROCEDURE — 97110 THERAPEUTIC EXERCISES: CPT

## 2023-12-11 PROCEDURE — 94761 N-INVAS EAR/PLS OXIMETRY MLT: CPT

## 2023-12-11 PROCEDURE — 6370000000 HC RX 637 (ALT 250 FOR IP): Performed by: SURGERY

## 2023-12-11 PROCEDURE — 2700000000 HC OXYGEN THERAPY PER DAY

## 2023-12-11 RX ORDER — FUROSEMIDE 40 MG/1
40 TABLET ORAL DAILY
Qty: 60 TABLET | Refills: 3 | Status: ON HOLD | OUTPATIENT
Start: 2023-12-11 | End: 2023-12-15 | Stop reason: HOSPADM

## 2023-12-11 RX ORDER — SPIRONOLACTONE 50 MG/1
50 TABLET, FILM COATED ORAL DAILY
Qty: 30 TABLET | Refills: 3 | Status: SHIPPED | OUTPATIENT
Start: 2023-12-11

## 2023-12-11 RX ADMIN — INSULIN LISPRO 1 UNITS: 100 INJECTION, SOLUTION INTRAVENOUS; SUBCUTANEOUS at 12:34

## 2023-12-11 RX ADMIN — Medication 1 TABLET: at 09:01

## 2023-12-11 RX ADMIN — OXYCODONE HYDROCHLORIDE AND ACETAMINOPHEN 500 MG: 500 TABLET ORAL at 09:00

## 2023-12-11 RX ADMIN — SPIRONOLACTONE 50 MG: 50 TABLET ORAL at 09:11

## 2023-12-11 RX ADMIN — MIDODRINE HYDROCHLORIDE 10 MG: 5 TABLET ORAL at 12:34

## 2023-12-11 RX ADMIN — INSULIN GLARGINE 10 UNITS: 100 INJECTION, SOLUTION SUBCUTANEOUS at 09:23

## 2023-12-11 RX ADMIN — SODIUM CHLORIDE, PRESERVATIVE FREE 10 ML: 5 INJECTION INTRAVENOUS at 09:03

## 2023-12-11 RX ADMIN — INSULIN LISPRO 1 UNITS: 100 INJECTION, SOLUTION INTRAVENOUS; SUBCUTANEOUS at 09:23

## 2023-12-11 RX ADMIN — MIDODRINE HYDROCHLORIDE 10 MG: 5 TABLET ORAL at 09:00

## 2023-12-11 RX ADMIN — FUROSEMIDE 40 MG: 40 TABLET ORAL at 09:11

## 2023-12-11 ASSESSMENT — PAIN SCALES - GENERAL
PAINLEVEL_OUTOF10: 0

## 2023-12-11 NOTE — PROGRESS NOTES
Physical Therapy  Facility/Department: [place] [address] [facility]  Daily Treatment Note  NAME: Vinicius Warren  : 1940  MRN: [id number]    Date of Service: 2023    Discharge Recommendations:  Vinicius Warren scored a 16/24 on the AM-PAC short mobility form. Current research shows that an AM-PAC score of 17 or less is typically not associated with a discharge to the patient's home setting. Based on the patient's AM-PAC score and their current functional mobility deficits, it is recommended that the patient have 3-5 sessions per week of Physical Therapy at d/c to increase the patient's independence. Please see assessment section for further patient specific details. If patient discharges prior to next session this note will serve as a discharge summary. Please see below for the latest assessment towards goals. [address], [address] with PT, 24 hour supervision or assist, Home with Home health PT   PT Equipment Recommendations  Equipment Needed:  (continue to assess)    Patient Diagnosis(es): The primary encounter diagnosis was Acute pulmonary edema ([address]). Diagnoses of Altered mental status, unspecified altered mental status type and Acute heart failure, unspecified heart failure type [facility]) were also pertinent to this visit. Assessment   Assessment: Pt requires up to min A for transfers & up to mod A to ambulate with RW. Pt demonstrates impaired balance, unsteady gait & is a high fall risk. Limited by poor cognition. Safety concerns for pt to go home upon D/C. Recommend further inpt PT. If pt goes home, recommend 24-hr hands-on assistance & home PT. Will follow per plan of care.   Activity Tolerance: Patient limited by endurance;Treatment limited secondary to decreased cognition  Equipment Needed:  (continue to assess)     Plan    Physical Therapy Plan  General Plan:  (2-5)  Current Treatment Recommendations: Strengthening;Balance training;Functional mobility training;Transfer

## 2023-12-11 NOTE — CARE COORDINATION
CM noted DC order- CM spoke with wife via phone regarding DCP. Plan as of last week was for pt to return home with [address], as she is refusing SNF placement- given [address] duty list & instructed she would have to call to get [facility] up. During conversation this morning, wife was tearful and stated that she disagreed with DC today because she can't take care of him at home by herself. CM reiterated previous conversation from last week- wife continues to refuse SNF placement at this time, states she wants home care. CM explained that we can get skilled home care in to see pt, but they would not be there 24/7- for that she will have to call and get private duty aide services set up. Wife stated she spoke with someone from the hospital named Indira Mcfadden ([phone] / [phone]) who suggested [facility]\"- CM spoke with Indira Mcfadden ([facility] intake department) who does not know of a place or program with this name. Pt and dgtr were both contacted by [facility] multiple times last week regarding screening for programs- declined screening. CM received permission from wife to call and speak with dgtr, CM reached [facility]  and LVM with call back number.      ADDENDUM 1138:             Case Management Assessment            Discharge Note                    Date / Time of Note: 12/11/2023 11:38 AM                  Discharge Note Completed by: Bernabe Garibay    Patient Name: Dalia Vasquez   YOB: 1940  Diagnosis: Acute pulmonary edema ([address]) [J81.0]  Altered mental status, unspecified altered mental status type [R41.82]  Acute heart failure, unspecified heart failure type [facility]) [I50.9]   Date / Time: 12/3/2023  4:08 AM    Current PCP: Meche Puri MD  Clinic patient: No    Hospitalization in the last 30 days: Yes  Readmission Assessment  Number of Days since last admission?: 8-30 days  Previous Disposition: SNF  Who is being Interviewed: Caregiver  What was the patient's/caregiver's perception as to why

## 2023-12-11 NOTE — PLAN OF CARE
[facility] [place] [facility] brief note  Consult received. Case reviewed with ER physician  79-year-old male admitted for acute heart failure and is altered from baseline. Full note to follow.     Brannon Gibbs MD    Thanks  Chastity Lara MD
Problem: ABCDS Injury Assessment  Goal: Absence of physical injury  12/7/2023 1525 by Alex Vaz RN  Outcome: Progressing  Flowsheets (Taken 12/7/2023 1525)  Absence of Physical Injury: Implement safety measures based on patient assessment  Note: Call light within reach, bed alarm on.  Video monitoring for safety     Problem: Safety - Adult  Goal: Free from fall injury  12/7/2023 1525 by Alex Vaz RN  Outcome: Progressing  Flowsheets (Taken 12/7/2023 1525)  Free From Fall Injury:   Based on caregiver fall risk screen, instruct family/caregiver to ask for assistance with transferring infant if caregiver noted to have fall risk factors   Instruct family/caregiver on patient safety     Problem: Chronic Conditions and Co-morbidities  Goal: Patient's chronic conditions and co-morbidity symptoms are monitored and maintained or improved  12/7/2023 1525 by Alex Vaz RN  Outcome: Progressing  Flowsheets (Taken 12/7/2023 1525)  Care Plan - Patient's Chronic Conditions and Co-Morbidity Symptoms are Monitored and Maintained or Improved:   Collaborate with multidisciplinary team to address chronic and comorbid conditions and prevent exacerbation or deterioration   Monitor and assess patient's chronic conditions and comorbid symptoms for stability, deterioration, or improvement   Update acute care plan with appropriate goals if chronic or comorbid symptoms are exacerbated and prevent overall improvement and discharge     Problem: Respiratory - Adult  Goal: Achieves optimal ventilation and oxygenation  12/7/2023 1525 by Alex Vaz RN  Outcome: Progressing  Flowsheets (Taken 12/7/2023 1525)  Achieves optimal ventilation and oxygenation:   Assess for changes in respiratory status   Assess for changes in mentation and behavior   Position to facilitate oxygenation and minimize respiratory effort  Note: Pt weaned to 6L High Flow Nasal Cannula
Problem: Discharge Planning  Goal: Discharge to home or other facility with appropriate resources  12/6/2023 0806 by Cody Tavera RN  Outcome: Progressing     Problem: Pain  Goal: Verbalizes/displays adequate comfort level or baseline comfort level  12/6/2023 0806 by Lizandro White RN  Outcome: Progressing     Problem: ABCDS Injury Assessment  Goal: Absence of physical injury  12/6/2023 2043 by Michelle Coronel, [address] [place] [address] (Taken 12/4/2023 0236 by Cristofer Lamas RN)  Absence of Physical Injury: Implement safety measures based on patient assessment  12/6/2023 0806 by Lizandro White RN  Outcome: Progressing     Problem: Safety - Adult  Goal: Free from fall injury  12/6/2023 2043 by Jerone Lesches, Dorie Berger, [address] [place] [address] (Taken 12/6/2023 2043)  Free From Fall Injury: Instruct family/caregiver on patient safety  12/6/2023 0806 by Lizandro White RN  Outcome: Progressing     Problem: Chronic Conditions and Co-morbidities  Goal: Patient's chronic conditions and co-morbidity symptoms are monitored and maintained or improved  12/6/2023 0806 by Lizandro White RN  Outcome: Progressing     Problem: Respiratory - Adult  Goal: Achieves optimal ventilation and oxygenation  12/6/2023 2043 by Michelle Coronel [address] [place] [address] (Taken 12/6/2023 2043)  Achieves optimal ventilation and oxygenation:   Assess for changes in respiratory status   Assess for changes in mentation and behavior   Position to facilitate oxygenation and minimize respiratory effort   Oxygen supplementation based on oxygen saturation or arterial blood gases  Note: Pt turned down to 6L.    12/6/2023 0806 by Cody Tavera RN  Outcome: Progressing     Problem: Metabolic/Fluid and Electrolytes - Adult  Goal: Electrolytes maintained within normal limits  12/6/2023 2043 by Michelle Coronel RN  Flowsheets (Taken 12/6/2023 2043)  Electrolytes maintained within normal limits:
Problem: Discharge Planning  Goal: Discharge to home or other facility with appropriate resources  Outcome: Completed     Problem: Pain  Goal: Verbalizes/displays adequate comfort level or baseline comfort level  Outcome: Completed     Problem: ABCDS Injury Assessment  Goal: Absence of physical injury  Outcome: Completed     Problem: Safety - Adult  Goal: Free from fall injury  Outcome: Completed     Problem: Chronic Conditions and Co-morbidities  Goal: Patient's chronic conditions and co-morbidity symptoms are monitored and maintained or improved  Outcome: Completed     Problem: Respiratory - Adult  Goal: Achieves optimal ventilation and oxygenation  Outcome: Completed     Problem: Metabolic/Fluid and Electrolytes - Adult  Goal: Electrolytes maintained within normal limits  Outcome: Completed  Goal: Hemodynamic stability and optimal renal function maintained  Outcome: Completed     Problem: Cardiovascular - Adult  Goal: Maintains optimal cardiac output and hemodynamic stability  Outcome: Completed  Goal: Absence of cardiac dysrhythmias or at baseline  Outcome: Completed     Problem: Skin/Tissue Integrity  Goal: Absence of new skin breakdown  Description: 1. Monitor for areas of redness and/or skin breakdown  2. Assess vascular access sites hourly  3. Every 4-6 hours minimum:  Change oxygen saturation probe site  4. Every 4-6 hours:  If on nasal continuous positive airway pressure, respiratory therapy assess nares and determine need for appliance change or resting period.   Outcome: Completed
Problem: Discharge Planning  Goal: Discharge to home or other facility with appropriate resources  Outcome: Progressing     Problem: Pain  Goal: Verbalizes/displays adequate comfort level or baseline comfort level  12/10/2023 2338 by Jori Parish RN  Outcome: Progressing     Problem: ABCDS Injury Assessment  Goal: Absence of physical injury  Outcome: Progressing     Problem: Safety - Adult  Goal: Free from fall injury  12/10/2023 2338 by Jori Parish RN  Outcome: Progressing     Problem: Chronic Conditions and Co-morbidities  Goal: Patient's chronic conditions and co-morbidity symptoms are monitored and maintained or improved  Outcome: Progressing     Problem: Respiratory - Adult  Goal: Achieves optimal ventilation and oxygenation  Outcome: Progressing     Problem: Metabolic/Fluid and Electrolytes - Adult  Goal: Electrolytes maintained within normal limits  Outcome: Progressing     Problem: Cardiovascular - Adult  Goal: Maintains optimal cardiac output and hemodynamic stability  Outcome: Progressing     Problem: Cardiovascular - Adult  Goal: Absence of cardiac dysrhythmias or at baseline  Outcome: Progressing
Problem: Discharge Planning  Goal: Discharge to home or other facility with appropriate resources  Outcome: Progressing     Problem: Pain  Goal: Verbalizes/displays adequate comfort level or baseline comfort level  Outcome: Progressing     Problem: ABCDS Injury Assessment  Goal: Absence of physical injury  12/7/2023 0544 by Nia Dobbins RN  Outcome: Progressing     Problem: ABCDS Injury Assessment  Goal: Absence of physical injury  12/7/2023 0544 by Nia Dobbins RN  Outcome: Progressing     Problem: Safety - Adult  Goal: Free from fall injury  12/7/2023 0544 by Nia Dobbins RN  Outcome: Progressing     Problem: Chronic Conditions and Co-morbidities  Goal: Patient's chronic conditions and co-morbidity symptoms are monitored and maintained or improved  Outcome: Progressing     Problem: Respiratory - Adult  Goal: Achieves optimal ventilation and oxygenation  12/7/2023 0544 by Nia Dobbins RN  Outcome: Progressing     Problem: Metabolic/Fluid and Electrolytes - Adult  Goal: Electrolytes maintained within normal limits  12/7/2023 0544 by Nia Dobbins RN  Outcome: Progressing     Problem: Metabolic/Fluid and Electrolytes - Adult  Goal: Hemodynamic stability and optimal renal function maintained  12/7/2023 0544 by Nia Dobbins RN  Outcome: Progressing
Problem: Discharge Planning  Goal: Discharge to home or other facility with appropriate resources  Outcome: Progressing     Problem: Pain  Goal: Verbalizes/displays adequate comfort level or baseline comfort level  Outcome: Progressing     Problem: ABCDS Injury Assessment  Goal: Absence of physical injury  Outcome: Progressing     Problem: Safety - Adult  Goal: Free from fall injury  Outcome: Progressing     Problem: Chronic Conditions and Co-morbidities  Goal: Patient's chronic conditions and co-morbidity symptoms are monitored and maintained or improved  Outcome: Progressing     Problem: Respiratory - Adult  Goal: Achieves optimal ventilation and oxygenation  Outcome: Progressing     Problem: Metabolic/Fluid and Electrolytes - Adult  Goal: Electrolytes maintained within normal limits  Outcome: Progressing
Problem: Discharge Planning  Goal: Discharge to home or other facility with appropriate resources  Outcome: Progressing     Problem: Pain  Goal: Verbalizes/displays adequate comfort level or baseline comfort level  Outcome: Progressing     Problem: ABCDS Injury Assessment  Goal: Absence of physical injury  Outcome: Progressing     Problem: Safety - Adult  Goal: Free from fall injury  Outcome: Progressing     Problem: Chronic Conditions and Co-morbidities  Goal: Patient's chronic conditions and co-morbidity symptoms are monitored and maintained or improved  Outcome: Progressing     Problem: Respiratory - Adult  Goal: Achieves optimal ventilation and oxygenation  Outcome: Progressing     Problem: Metabolic/Fluid and Electrolytes - Adult  Goal: Electrolytes maintained within normal limits  Outcome: Progressing  Goal: Hemodynamic stability and optimal renal function maintained  Outcome: Progressing
Problem: Discharge Planning  Goal: Discharge to home or other facility with appropriate resources  Outcome: Progressing  Flowsheets (Taken 12/10/2023 0308)  Discharge to home or other facility with appropriate resources:   Arrange for needed discharge resources and transportation as appropriate   Identify discharge learning needs (meds, wound care, etc)     Problem: Pain  Goal: Verbalizes/displays adequate comfort level or baseline comfort level  Outcome: Progressing  Flowsheets (Taken 12/10/2023 0308)  Verbalizes/displays adequate comfort level or baseline comfort level:   Encourage patient to monitor pain and request assistance   Assess pain using appropriate pain scale   Administer analgesics based on type and severity of pain and evaluate response   Implement non-pharmacological measures as appropriate and evaluate response     Problem: ABCDS Injury Assessment  Goal: Absence of physical injury  Outcome: Progressing     Problem: Safety - Adult  Goal: Free from fall injury  Outcome: Progressing  Flowsheets (Taken 12/10/2023 0308)  Free From Fall Injury: Instruct family/caregiver on patient safety     Problem: Chronic Conditions and Co-morbidities  Goal: Patient's chronic conditions and co-morbidity symptoms are monitored and maintained or improved  Outcome: Progressing  Flowsheets (Taken 12/10/2023 0308)  Care Plan - Patient's Chronic Conditions and Co-Morbidity Symptoms are Monitored and Maintained or Improved:   Monitor and assess patient's chronic conditions and comorbid symptoms for stability, deterioration, or improvement   Collaborate with multidisciplinary team to address chronic and comorbid conditions and prevent exacerbation or deterioration   Update acute care plan with appropriate goals if chronic or comorbid symptoms are exacerbated and prevent overall improvement and discharge     Problem: Respiratory - Adult  Goal: Achieves optimal ventilation and oxygenation  Outcome: Progressing  Flowsheets
Problem: Discharge Planning  Goal: Discharge to home or other facility with appropriate resources  Outcome: Progressing  Flowsheets (Taken 12/4/2023 0236)  Discharge to home or other facility with appropriate resources:   Identify barriers to discharge with patient and caregiver   Identify discharge learning needs (meds, wound care, etc)   Refer to discharge planning if patient needs post-hospital services based on physician order or complex needs related to functional status, cognitive ability or social support system   Arrange for needed discharge resources and transportation as appropriate     Problem: ABCDS Injury Assessment  Goal: Absence of physical injury  Outcome: Progressing  Flowsheets (Taken 12/4/2023 0236)  Absence of Physical Injury: Implement safety measures based on patient assessment     Problem: Chronic Conditions and Co-morbidities  Goal: Patient's chronic conditions and co-morbidity symptoms are monitored and maintained or improved  Outcome: Progressing  Flowsheets (Taken 12/4/2023 0236)  Care Plan - Patient's Chronic Conditions and Co-Morbidity Symptoms are Monitored and Maintained or Improved:   Monitor and assess patient's chronic conditions and comorbid symptoms for stability, deterioration, or improvement   Collaborate with multidisciplinary team to address chronic and comorbid conditions and prevent exacerbation or deterioration   Update acute care plan with appropriate goals if chronic or comorbid symptoms are exacerbated and prevent overall improvement and discharge     Problem: Respiratory - Adult  Goal: Achieves optimal ventilation and oxygenation  Outcome: Progressing  Flowsheets (Taken 12/4/2023 0236)  Achieves optimal ventilation and oxygenation:   Assess for changes in respiratory status   Position to facilitate oxygenation and minimize respiratory effort   Initiate smoking cessation protocol as indicated   Assess the need for suctioning and aspirate as needed   Respiratory therapy
Problem: Pain  Goal: Verbalizes/displays adequate comfort level or baseline comfort level  12/10/2023 1442 by Mignon Lucas RN  Outcome: Progressing  Patient receiving prn pain medication PO/IV       Problem: Safety - Adult  Goal: Free from fall injury  12/10/2023 1442 by Mignon Lucas RN  Outcome: Progressing  Patient is free from injury this shift
Problem: Pain  Goal: Verbalizes/displays adequate comfort level or baseline comfort level  Flowsheets (Taken 12/5/2023 1652)  Verbalizes/displays adequate comfort level or baseline comfort level:   Encourage patient to monitor pain and request assistance   Assess pain using appropriate pain scale   Administer analgesics based on type and severity of pain and evaluate response     Problem: ABCDS Injury Assessment  Goal: Absence of physical injury  Flowsheets (Taken 12/4/2023 0236 by Janet Weber RN)  Absence of Physical Injury: Implement safety measures based on patient assessment     Problem: Chronic Conditions and Co-morbidities  Goal: Patient's chronic conditions and co-morbidity symptoms are monitored and maintained or improved  Flowsheets (Taken 12/5/2023 1652)  Care Plan - Patient's Chronic Conditions and Co-Morbidity Symptoms are Monitored and Maintained or Improved:   Monitor and assess patient's chronic conditions and comorbid symptoms for stability, deterioration, or improvement   Collaborate with multidisciplinary team to address chronic and comorbid conditions and prevent exacerbation or deterioration     Problem: Respiratory - Adult  Goal: Achieves optimal ventilation and oxygenation  Flowsheets (Taken 12/5/2023 1652)  Achieves optimal ventilation and oxygenation:   Assess for changes in respiratory status   Assess for changes in mentation and behavior   Oxygen supplementation based on oxygen saturation or arterial blood gases   Position to facilitate oxygenation and minimize respiratory effort     Problem: Metabolic/Fluid and Electrolytes - Adult  Goal: Electrolytes maintained within normal limits  Flowsheets (Taken 12/5/2023 1652)  Electrolytes maintained within normal limits:   Monitor response to electrolyte replacements, including repeat lab results as appropriate   Monitor labs and assess patient for signs and symptoms of electrolyte imbalances   Administer electrolyte replacement as ordered
Problem: Pain  Goal: Verbalizes/displays adequate comfort level or baseline comfort level  Outcome: Progressing     Problem: Safety - Adult  Goal: Free from fall injury  Outcome: Progressing
Problem: Pain  Goal: Verbalizes/displays adequate comfort level or baseline comfort level  Outcome: Progressing  Flowsheets (Taken 12/9/2023 0439)  Verbalizes/displays adequate comfort level or baseline comfort level:   Encourage patient to monitor pain and request assistance   Administer analgesics based on type and severity of pain and evaluate response   Consider cultural and social influences on pain and pain management   Assess pain using appropriate pain scale   Implement non-pharmacological measures as appropriate and evaluate response   Notify Licensed Independent Practitioner if interventions unsuccessful or patient reports new pain  Note: Pt complaining of pain in legs. Gave prn pain meds.  Will continue to assess using appropriate pain scale     Problem: Safety - Adult  Goal: Free from fall injury  Outcome: Progressing  Flowsheets (Taken 12/9/2023 0439)  Free From Fall Injury: Instruct family/caregiver on patient safety     Problem: Chronic Conditions and Co-morbidities  Goal: Patient's chronic conditions and co-morbidity symptoms are monitored and maintained or improved  Outcome: Progressing  Flowsheets (Taken 12/9/2023 0439)  Care Plan - Patient's Chronic Conditions and Co-Morbidity Symptoms are Monitored and Maintained or Improved: Monitor and assess patient's chronic conditions and comorbid symptoms for stability, deterioration, or improvement
Problem: SLP Adult - Impaired Swallowing  Goal: By Discharge: Advance to least restrictive diet without signs or symptoms of aspiration for planned discharge setting. See evaluation for individualized goals.   Outcome: Progressing
Problem: Safety - Adult  Goal: Free from fall injury  Outcome: Progressing     Problem: Respiratory - Adult  Goal: Achieves optimal ventilation and oxygenation  Outcome: Progressing
broncho-pulmonary hygiene including cough, deep breathe, incentive spirometry   Assess and instruct to report shortness of breath or any respiratory difficulty  Note: SpO2 94% 5 L HFNC     Problem: Cardiovascular - Adult  Goal: Maintains optimal cardiac output and hemodynamic stability  Outcome: Progressing  Flowsheets (Taken 12/5/2023 0031)  Maintains optimal cardiac output and hemodynamic stability:   Monitor blood pressure and heart rate   Monitor urine output and notify Licensed Independent Practitioner for values outside of normal range   Assess for signs of decreased cardiac output   Administer fluid and/or volume expanders as ordered   Administer vasoactive medications as ordered     Problem: Cardiovascular - Adult  Goal: Absence of cardiac dysrhythmias or at baseline  Outcome: Progressing  Flowsheets (Taken 12/5/2023 0031)  Absence of cardiac dysrhythmias or at baseline:   Monitor cardiac rate and rhythm   Administer antiarrhythmia medication and electrolyte replacement as ordered   Assess for signs of decreased cardiac output

## 2023-12-11 NOTE — DISCHARGE SUMMARY
V2.0  Discharge Summary    Name:  Dorcas Wang /Age/Sex: 1940 (83 y.o. male)   Admit Date: 12/3/2023  Discharge Date: 23    MRN & CSN:  [id number] & [id number] Encounter Date and Time 23 9:47 AM EST    Attending:  Zarina Vázquez MD Discharging Provider: Zarina Vázquez MD       Discharge Diagnosess:     HFpEF exacerbation  Acute on chronic hypoxic failure  Acute metabolic encephalopathy  Hyponatremia  Elevated troponin related to demand ischemia  DM2  Diffuse large B cj lymphoma  Large retroperitoneal mass      Admission HPI, hospital course, and consultants:     Admission HPI:  \"79 y.o. 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 Patient is confused all history is taken from wife and grandson as well as reviewing patient's chart. Patient fell earlier this morning they were unable to get him up. EMS found patient to have sats in the 70s despite being on 3 L placed on nonrebreather. Once here in the ER he was able to be weaned down to 5 L and now after receiving IV Lasix is back to his baseline of 3 L. Patient wife states that he had been sent to the skilled nursing facility and just discharged on the first of this month. Patient was fine yesterday according to the wife back to his normal baseline mentation but now he is confused again. [facility] course: patient achieved net negative -6.5 liters after dobutamine assisted Lasix drip diuresis. Acute hypoxic respiratory failure: At home patient was found to have sats in the 70s by squad and required nonrebreather at that time.   Currently patient is back at baseline 3 L nasal cannula after receiving diuretic Lab Results   Component Value Date/Time    LABA1C 7.7 11/13/2023 03:53 AM     TSH:   Lab Results   Component Value Date/Time    TSH 0.68 12/03/2023 06:27 AM     Troponin: No results found for: \"TROPONINT\"  Lactic Acid: No results for input(s): \"LACTA\" in the last 72 hours. BNP:   Recent Labs     12/09/23  0501   PROBNP 468*     UA:  Lab Results   Component Value Date/Time    NITRU Negative 12/03/2023 01:30 PM    COLORU Yellow 12/03/2023 01:30 PM    PHUR 6.0 12/03/2023 01:30 PM    WBCUA 0-2 11/12/2023 05:56 PM    RBCUA 0-2 11/12/2023 05:56 PM    MUCUS Rare 11/12/2023 05:56 PM    BACTERIA Rare 11/12/2023 05:56 PM    CLARITYU Clear 12/03/2023 01:30 PM    SPECGRAV 1.015 12/03/2023 01:30 PM    LEUKOCYTESUR Negative 12/03/2023 01:30 PM    UROBILINOGEN 0.2 12/03/2023 01:30 PM    BILIRUBINUR Negative 12/03/2023 01:30 PM    BLOODU Negative 12/03/2023 01:30 PM    GLUCOSEU Negative 12/03/2023 01:30 PM    KETUA Negative 12/03/2023 01:30 PM     Urine Cultures:   Lab Results   Component Value Date/Time    LABURIN No growth at 18 to 36 hours 08/19/2020 03:55 AM     Blood Cultures:   Lab Results   Component Value Date/Time    BC No Growth after 4 days of incubation. 11/12/2023 11:32 PM     Lab Results   Component Value Date/Time    BLOODCULT2 No Growth after 4 days of incubation.  11/12/2023 11:32 PM     Organism:   Lab Results   Component Value Date/Time    ORG Haemophilus parainfluenzae 08/19/2020 05:50 AM    ORG Candida albicans 08/19/2020 05:50 AM       Time Spent Discharging patient 45 minutes    Electronically signed by Andrea More MD on 12/11/2023 at 9:47 AM

## 2023-12-11 NOTE — CARE COORDINATION
[facility]    Patient aware and agreeable to services.  Faxed orders to [facility] for [facility] by 12/13    Natali Soriano LPN  Care Transition Nurse  [facility]  [phone]

## 2023-12-11 NOTE — PROGRESS NOTES
Pt discharged from unit. Family to drive pt home. IV and tele removed. AVS reviewed. All questions asked and answered. Meds to beds provided.

## 2023-12-12 PROBLEM — N17.9 ACUTE KIDNEY INJURY (HCC): Status: ACTIVE | Noted: 2023-12-12

## 2024-01-09 ENCOUNTER — HOSPITAL ENCOUNTER (INPATIENT)
Age: 84
LOS: 10 days | Discharge: NURSING, REHAB OR LONG-TERM CARE | End: 2024-01-19
Attending: EMERGENCY MEDICINE | Admitting: STUDENT IN AN ORGANIZED HEALTH CARE EDUCATION/TRAINING PROGRAM
Payer: MEDICARE

## 2024-01-09 ENCOUNTER — APPOINTMENT (OUTPATIENT)
Dept: GENERAL RADIOLOGY | Age: 84
End: 2024-01-09
Payer: MEDICARE

## 2024-01-09 DIAGNOSIS — J96.21 ACUTE ON CHRONIC HYPOXIC RESPIRATORY FAILURE (HCC): ICD-10-CM

## 2024-01-09 DIAGNOSIS — U07.1 PNEUMONIA DUE TO COVID-19 VIRUS: Primary | ICD-10-CM

## 2024-01-09 DIAGNOSIS — J12.82 PNEUMONIA DUE TO COVID-19 VIRUS: Primary | ICD-10-CM

## 2024-01-09 LAB
ANION GAP SERPL CALCULATED.3IONS-SCNC: 13 MMOL/L (ref 3–16)
BASE EXCESS BLDA CALC-SCNC: -1.2 MMOL/L (ref -3–3)
BASE EXCESS BLDV CALC-SCNC: -1 MMOL/L (ref -2–3)
BASOPHILS # BLD: 0 K/UL (ref 0–0.2)
BASOPHILS NFR BLD: 0.2 %
BUN SERPL-MCNC: 27 MG/DL (ref 7–20)
CALCIUM SERPL-MCNC: 9.5 MG/DL (ref 8.3–10.6)
CHLORIDE SERPL-SCNC: 97 MMOL/L (ref 99–110)
CO2 BLDA-SCNC: 23 MMOL/L
CO2 BLDV-SCNC: 28 MMOL/L
CO2 SERPL-SCNC: 21 MMOL/L (ref 21–32)
COHGB MFR BLDA: 1.1 % (ref 0–1.5)
COHGB MFR BLDV: 1.1 % (ref 0–1.5)
CREAT SERPL-MCNC: 1.1 MG/DL (ref 0.8–1.3)
CRP SERPL-MCNC: 106.4 MG/L (ref 0–5.1)
DEPRECATED RDW RBC AUTO: 15.3 % (ref 12.4–15.4)
DO-HGB MFR BLDV: 90.7 %
EKG ATRIAL RATE: 101 BPM
EKG DIAGNOSIS: NORMAL
EKG P AXIS: 50 DEGREES
EKG P-R INTERVAL: 144 MS
EKG Q-T INTERVAL: 352 MS
EKG QRS DURATION: 96 MS
EKG QTC CALCULATION (BAZETT): 456 MS
EKG R AXIS: -54 DEGREES
EKG T AXIS: 102 DEGREES
EKG VENTRICULAR RATE: 101 BPM
EOSINOPHIL # BLD: 0.1 K/UL (ref 0–0.6)
EOSINOPHIL NFR BLD: 1 %
FLUAV RNA RESP QL NAA+PROBE: NOT DETECTED
FLUBV RNA RESP QL NAA+PROBE: NOT DETECTED
GFR SERPLBLD CREATININE-BSD FMLA CKD-EPI: >60 ML/MIN/{1.73_M2}
GLUCOSE BLD-MCNC: 283 MG/DL (ref 70–99)
GLUCOSE BLD-MCNC: 289 MG/DL (ref 70–99)
GLUCOSE BLD-MCNC: 291 MG/DL (ref 70–99)
GLUCOSE SERPL-MCNC: 258 MG/DL (ref 70–99)
HCO3 BLDA-SCNC: 22 MMOL/L (ref 21–29)
HCO3 BLDV-SCNC: 26 MMOL/L (ref 24–28)
HCT VFR BLD AUTO: 33.8 % (ref 40.5–52.5)
HGB BLD-MCNC: 11.3 G/DL (ref 13.5–17.5)
HGB BLDA-MCNC: 10.7 G/DL
LACTATE BLDV-SCNC: 3.4 MMOL/L (ref 0.4–2)
LYMPHOCYTES # BLD: 1 K/UL (ref 1–5.1)
LYMPHOCYTES NFR BLD: 4 %
MCH RBC QN AUTO: 31.6 PG (ref 26–34)
MCHC RBC AUTO-ENTMCNC: 33.4 G/DL (ref 31–36)
MCV RBC AUTO: 94.7 FL (ref 80–100)
METHGB MFR BLDA: 0.1 % (ref 0–1.4)
METHGB MFR BLDV: 0.3 % (ref 0–1.5)
MONOCYTES # BLD: 0.4 K/UL (ref 0–1.3)
MONOCYTES NFR BLD: 5 %
NEUTROPHILS # BLD: 6.5 K/UL (ref 1.7–7.7)
NEUTROPHILS NFR BLD: 78 %
NEUTS BAND NFR BLD MANUAL: 3 % (ref 0–7)
NT-PROBNP SERPL-MCNC: 734 PG/ML (ref 0–449)
PATH INTERP BLD-IMP: ABNORMAL
PCO2 BLDA: 32.4 MMHG (ref 35–45)
PCO2 BLDV: 52.4 MMHG (ref 41–51)
PERFORMED ON: ABNORMAL
PH BLDA: 7.45 [PH] (ref 7.35–7.45)
PH BLDV: 7.3 [PH] (ref 7.35–7.45)
PLATELET # BLD AUTO: 314 K/UL (ref 135–450)
PMV BLD AUTO: 8.2 FL (ref 5–10.5)
PO2 BLDA: 161 MMHG (ref 75–108)
PO2 BLDV: <30 MMHG (ref 25–40)
POTASSIUM SERPL-SCNC: 4.1 MMOL/L (ref 3.5–5.1)
POTASSIUM SERPL-SCNC: 5.3 MMOL/L (ref 3.5–5.1)
PROCALCITONIN SERPL IA-MCNC: 0.24 NG/ML (ref 0–0.15)
RBC # BLD AUTO: 3.57 M/UL (ref 4.2–5.9)
SAO2 % BLDA: 100 % (ref 93–100)
SAO2 % BLDV: 8 %
SARS-COV-2 RNA RESP QL NAA+PROBE: DETECTED
SODIUM SERPL-SCNC: 131 MMOL/L (ref 136–145)
TROPONIN, HIGH SENSITIVITY: 51 NG/L (ref 0–22)
TROPONIN, HIGH SENSITIVITY: 55 NG/L (ref 0–22)
VARIANT LYMPHS NFR BLD MANUAL: 9 % (ref 0–6)
WBC # BLD AUTO: 8 K/UL (ref 4–11)

## 2024-01-09 PROCEDURE — 94761 N-INVAS EAR/PLS OXIMETRY MLT: CPT

## 2024-01-09 PROCEDURE — 6370000000 HC RX 637 (ALT 250 FOR IP)

## 2024-01-09 PROCEDURE — 71045 X-RAY EXAM CHEST 1 VIEW: CPT

## 2024-01-09 PROCEDURE — 2700000000 HC OXYGEN THERAPY PER DAY

## 2024-01-09 PROCEDURE — 36415 COLL VENOUS BLD VENIPUNCTURE: CPT

## 2024-01-09 PROCEDURE — 84132 ASSAY OF SERUM POTASSIUM: CPT

## 2024-01-09 PROCEDURE — 6360000002 HC RX W HCPCS: Performed by: STUDENT IN AN ORGANIZED HEALTH CARE EDUCATION/TRAINING PROGRAM

## 2024-01-09 PROCEDURE — 85025 COMPLETE CBC W/AUTO DIFF WBC: CPT

## 2024-01-09 PROCEDURE — 36600 WITHDRAWAL OF ARTERIAL BLOOD: CPT

## 2024-01-09 PROCEDURE — 86140 C-REACTIVE PROTEIN: CPT

## 2024-01-09 PROCEDURE — 6360000002 HC RX W HCPCS: Performed by: NURSE PRACTITIONER

## 2024-01-09 PROCEDURE — 2580000003 HC RX 258

## 2024-01-09 PROCEDURE — 3E0333Z INTRODUCTION OF ANTI-INFLAMMATORY INTO PERIPHERAL VEIN, PERCUTANEOUS APPROACH: ICD-10-PCS | Performed by: INTERNAL MEDICINE

## 2024-01-09 PROCEDURE — 82803 BLOOD GASES ANY COMBINATION: CPT

## 2024-01-09 PROCEDURE — 87636 SARSCOV2 & INF A&B AMP PRB: CPT

## 2024-01-09 PROCEDURE — 2580000003 HC RX 258: Performed by: NURSE PRACTITIONER

## 2024-01-09 PROCEDURE — 80048 BASIC METABOLIC PNL TOTAL CA: CPT

## 2024-01-09 PROCEDURE — 99285 EMERGENCY DEPT VISIT HI MDM: CPT

## 2024-01-09 PROCEDURE — 83880 ASSAY OF NATRIURETIC PEPTIDE: CPT

## 2024-01-09 PROCEDURE — 84484 ASSAY OF TROPONIN QUANT: CPT

## 2024-01-09 PROCEDURE — 84145 PROCALCITONIN (PCT): CPT

## 2024-01-09 PROCEDURE — 83605 ASSAY OF LACTIC ACID: CPT

## 2024-01-09 PROCEDURE — 6360000002 HC RX W HCPCS: Performed by: EMERGENCY MEDICINE

## 2024-01-09 PROCEDURE — 1200000000 HC SEMI PRIVATE

## 2024-01-09 PROCEDURE — 93005 ELECTROCARDIOGRAM TRACING: CPT | Performed by: EMERGENCY MEDICINE

## 2024-01-09 RX ORDER — POTASSIUM CHLORIDE 7.45 MG/ML
10 INJECTION INTRAVENOUS PRN
Status: DISCONTINUED | OUTPATIENT
Start: 2024-01-09 | End: 2024-01-19 | Stop reason: HOSPADM

## 2024-01-09 RX ORDER — DEXTROSE MONOHYDRATE 100 MG/ML
INJECTION, SOLUTION INTRAVENOUS CONTINUOUS PRN
Status: DISCONTINUED | OUTPATIENT
Start: 2024-01-09 | End: 2024-01-19 | Stop reason: HOSPADM

## 2024-01-09 RX ORDER — GLUCAGON 1 MG/ML
1 KIT INJECTION PRN
Status: DISCONTINUED | OUTPATIENT
Start: 2024-01-09 | End: 2024-01-19 | Stop reason: HOSPADM

## 2024-01-09 RX ORDER — INSULIN LISPRO 100 [IU]/ML
0-8 INJECTION, SOLUTION INTRAVENOUS; SUBCUTANEOUS
Status: DISCONTINUED | OUTPATIENT
Start: 2024-01-09 | End: 2024-01-19 | Stop reason: HOSPADM

## 2024-01-09 RX ORDER — POLYETHYLENE GLYCOL 3350 17 G/17G
17 POWDER, FOR SOLUTION ORAL DAILY PRN
Status: DISCONTINUED | OUTPATIENT
Start: 2024-01-09 | End: 2024-01-19 | Stop reason: HOSPADM

## 2024-01-09 RX ORDER — POTASSIUM CHLORIDE 20 MEQ/1
40 TABLET, EXTENDED RELEASE ORAL PRN
Status: DISCONTINUED | OUTPATIENT
Start: 2024-01-09 | End: 2024-01-19 | Stop reason: HOSPADM

## 2024-01-09 RX ORDER — ONDANSETRON 4 MG/1
4 TABLET, ORALLY DISINTEGRATING ORAL EVERY 8 HOURS PRN
Status: DISCONTINUED | OUTPATIENT
Start: 2024-01-09 | End: 2024-01-19 | Stop reason: HOSPADM

## 2024-01-09 RX ORDER — SODIUM CHLORIDE 0.9 % (FLUSH) 0.9 %
5-40 SYRINGE (ML) INJECTION PRN
Status: DISCONTINUED | OUTPATIENT
Start: 2024-01-09 | End: 2024-01-19 | Stop reason: HOSPADM

## 2024-01-09 RX ORDER — INSULIN GLARGINE 100 [IU]/ML
8 INJECTION, SOLUTION SUBCUTANEOUS NIGHTLY
Status: DISCONTINUED | OUTPATIENT
Start: 2024-01-09 | End: 2024-01-10

## 2024-01-09 RX ORDER — SODIUM CHLORIDE 0.9 % (FLUSH) 0.9 %
5-40 SYRINGE (ML) INJECTION EVERY 12 HOURS SCHEDULED
Status: DISCONTINUED | OUTPATIENT
Start: 2024-01-09 | End: 2024-01-19 | Stop reason: HOSPADM

## 2024-01-09 RX ORDER — FUROSEMIDE 10 MG/ML
20 INJECTION INTRAMUSCULAR; INTRAVENOUS 2 TIMES DAILY
Status: DISCONTINUED | OUTPATIENT
Start: 2024-01-09 | End: 2024-01-11

## 2024-01-09 RX ORDER — MAGNESIUM SULFATE IN WATER 40 MG/ML
2000 INJECTION, SOLUTION INTRAVENOUS PRN
Status: DISCONTINUED | OUTPATIENT
Start: 2024-01-09 | End: 2024-01-19 | Stop reason: HOSPADM

## 2024-01-09 RX ORDER — ONDANSETRON 2 MG/ML
4 INJECTION INTRAMUSCULAR; INTRAVENOUS EVERY 6 HOURS PRN
Status: DISCONTINUED | OUTPATIENT
Start: 2024-01-09 | End: 2024-01-19 | Stop reason: HOSPADM

## 2024-01-09 RX ORDER — DEXAMETHASONE SODIUM PHOSPHATE 10 MG/ML
6 INJECTION, SOLUTION INTRAMUSCULAR; INTRAVENOUS EVERY 6 HOURS
Status: DISCONTINUED | OUTPATIENT
Start: 2024-01-09 | End: 2024-01-09 | Stop reason: SDUPTHER

## 2024-01-09 RX ORDER — INSULIN LISPRO 100 [IU]/ML
0-4 INJECTION, SOLUTION INTRAVENOUS; SUBCUTANEOUS NIGHTLY
Status: DISCONTINUED | OUTPATIENT
Start: 2024-01-09 | End: 2024-01-19 | Stop reason: HOSPADM

## 2024-01-09 RX ORDER — DEXAMETHASONE SODIUM PHOSPHATE 10 MG/ML
6 INJECTION, SOLUTION INTRAMUSCULAR; INTRAVENOUS EVERY 24 HOURS
Status: DISCONTINUED | OUTPATIENT
Start: 2024-01-09 | End: 2024-01-13

## 2024-01-09 RX ORDER — SODIUM CHLORIDE 9 MG/ML
INJECTION, SOLUTION INTRAVENOUS PRN
Status: DISCONTINUED | OUTPATIENT
Start: 2024-01-09 | End: 2024-01-19 | Stop reason: HOSPADM

## 2024-01-09 RX ADMIN — FUROSEMIDE 20 MG: 10 INJECTION, SOLUTION INTRAMUSCULAR; INTRAVENOUS at 19:28

## 2024-01-09 RX ADMIN — WATER 5 MG: 1 INJECTION INTRAMUSCULAR; INTRAVENOUS; SUBCUTANEOUS at 23:56

## 2024-01-09 RX ADMIN — DEXAMETHASONE SODIUM PHOSPHATE 6 MG: 10 INJECTION INTRAMUSCULAR; INTRAVENOUS at 15:12

## 2024-01-09 RX ADMIN — SODIUM CHLORIDE, PRESERVATIVE FREE 10 ML: 5 INJECTION INTRAVENOUS at 19:31

## 2024-01-09 RX ADMIN — INSULIN LISPRO 4 UNITS: 100 INJECTION, SOLUTION INTRAVENOUS; SUBCUTANEOUS at 19:25

## 2024-01-09 NOTE — CARE COORDINATION
Maker: ricco moore - Spouse - [phone]    Discharge Planning:    Patient lives with: Spouse/Significant Other Type of Home: House  Primary Care Giver: Self  Patient Support Systems include: Spouse/Significant Other, Family Members   Current Financial resources: Medicare  Current community resources:    Current services prior to admission: Skilled Nursing Facility            Current DME:              Type of Home Care services:  None    ADLS  Prior functional level: Assistance with the following:, Mobility, Dressing, Bathing, Toileting, Cooking, Housework, Shopping  Current functional level: Assistance with the following:, Mobility, Bathing, Dressing, Toileting, Feeding, Housework, Shopping, Cooking    PT AM-PAC:   /24  OT AM-PAC:   /24    Family can provide assistance at DC: Yes  Would you like Case Management to discuss the discharge plan with any other family members/significant others, and if so, who? Yes (wife)  Plans to Return to Present Housing: Yes  Other Identified Issues/Barriers to RETURNING to current housing: med stability   Potential Assistance needed at discharge: Skilled Nursing Facility            Potential DME:    Patient expects to discharge to: Skilled nursing facility  Plan for transportation at discharge: Other (see comment) (BLS)    Financial    Payor: MEDICARE / Plan: MEDICARE PART A AND B / Product Type: *No Product type* /     Does insurance require precert for SNF: No    Potential assistance Purchasing Medications: No  Meds-to-Beds request:        [id number] - [place], [place] - [address] - P [phone] - F [phone]  [address]  [facility] [address]  Phone: [phone] Fax: [phone]      Notes:    Factors facilitating achievement of predicted outcomes: Family support    Barriers to discharge: Confusion, Cognitive deficit, Limited safety awareness, Limited participation, Limited insight into deficits, Upper extremity weakness, Lower  extremity weakness, Long standing deficits, and Medical complications    Additional Case Management Notes:     Pt is from home w/his wife and was recently dc to [facility].   SW spoke to admissions at [facility]. They have a covid outbreak and suspected pt may have covid. Pt was on their skilled side and is able to return at dc if needed/wanted. Sophy will continue to follow pt. Pt is on 3L o2 at baseline, currently on 5L NC.     The Plan for Transition of Care is related to the following treatment goals of COVID-19 [U07.1]    IF APPLICABLE: The Patient and/or patient representative Mike and his family were provided with a choice of provider and agrees with the discharge plan. Freedom of choice list with basic dialogue that supports the patient's individualized plan of care/goals and shares the quality data associated with the providers was provided to:     Patient Representative Name:       The Patient and/or Patient Representative Agree with the Discharge Plan?      Aletha Zafar, BAO, LSW  Case Management Department  Ph: [phone]

## 2024-01-09 NOTE — H&P
ICU/Internal Medicine Note    Patient Name: Mike Patel   Admit Date: 1/9/2024   Code:Prior  PCP: Mika Orellana MD   Attending: Vonda Magaña MD    Chief Complaint: Respiratory Distress (Per nursing facility pts sats in 40s. Ems placed on nonrebreather and got readings in 80s. Pt alert and oriented )       Subjective   HPI:   Mike Patel is a 83 y.o. 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      ROS:  As per HPI    ED Course:  Oriented x 3, tachypneic  Vitals: BP of 132/91, HR of 118, Temp of afebrile, SpO2 of 93% on 15 L of oxygen  Physical Exam: Tachycardic, tachypneic, no wheezing, some basilar crackles  EKG: Normal sinus rhythm without any ischemic changes  Labs: Hypokalemia of 5.3, BUN/creatinine normal range, lactic acidosis of 3.4 without anion gap, proBNP elevated to 734 with baseline of 400, trops 51, CBC WNL, COVID-positive, VBG showed mild hypoxic and hypercapnic respiratory failure with pH of 7.3 and pCO2 of 52.4 later improved to 32.4  Imaging: CXR shows residual opacities at the left lung bases, improved appearance of opacities of right lung base.  Treatment: Dexamethasone    Summary of Clinical Encounters:  12-17/12/2023: Hospital admission.  Presented with dizziness lightheadedness diagnosed with FAWAD due to overdiuresis, pulmonology consulted and hypoxia was attributed to COVID-related he was sent on oxygen on exertion.  Lasix 40 was stopped  3-11/12/2023: Hospital admission presented with shortness of breath, diagnosed with heart failure exacerbation and was treated with IV Lasix with dobutamine.  12-19/11/2023: Presented with altered mentation, MRI brain done which was negative, diagnosed with COVID-pneumonia to be likely reason for metabolic encephalopathy    Significant Diagnostic Studies:  PFTs  Mouth/Throat:      Mouth: Mucous membranes are moist.   Eyes:      Extraocular Movements: Extraocular movements intact.      Conjunctiva/sclera: Conjunctivae normal.      Pupils: Pupils are equal, round, and reactive to light.   Cardiovascular:      Rate and Rhythm: Normal rate and regular rhythm.      Pulses: Normal pulses.      Heart sounds: Normal heart sounds. No murmur heard.     No friction rub. No gallop.   Pulmonary:      Effort: Pulmonary effort is normal. No respiratory distress.      Breath sounds: No stridor. Rhonchi and rales present. No wheezing.   Chest:      Chest wall: No tenderness.   Abdominal:      General: Abdomen is flat. There is no distension.      Palpations: Abdomen is soft.      Tenderness: There is no abdominal tenderness. There is no guarding.   Genitourinary:     Testes: Normal.   Musculoskeletal:         General: No swelling, tenderness or deformity.      Cervical back: Normal range of motion and neck supple. No rigidity or tenderness.      Right lower leg: Edema (+2 pitting) present.      Left lower leg: Edema (+2 pitting) present.   Skin:     General: Skin is warm.      Capillary Refill: Capillary refill takes less than 2 seconds.      Coloration: Skin is not jaundiced or pale.   Neurological:      General: No focal deficit present.      Mental Status: He is alert and oriented to person, place, and time.      Cranial Nerves: No cranial nerve deficit.      Sensory: No sensory deficit.      Motor: No weakness.      Coordination: Coordination normal.      Gait: Gait normal.   Psychiatric:         Mood and Affect: Mood normal.         Behavior: Behavior normal.           Labs:   Recent Labs     01/09/24  0841   *   K 5.3*   BUN 27*   CREATININE 1.1   CL 97*   CO2 21   GLUCOSE 258*   CALCIUM 9.5     Recent Labs     01/09/24  0841   WBC 8.0   HGB 11.3*   HCT 33.8*      MCV 94.7     No results for input(s): \"CHOLTOT\", \"TRIG\", \"HDL\", \"CHOLHDL\", \"LDL\" in the last 72

## 2024-01-09 NOTE — ED PROVIDER NOTES
THE [facility]  EMERGENCY DEPARTMENT ENCOUNTER          ATTENDING PHYSICIAN NOTE       Date of evaluation: 1/9/2024    Chief Complaint     Respiratory Distress (Per nursing facility pts sats in 40s. Ems placed on nonrebreather and got readings in 80s. Pt alert and oriented )      History of Present Illness     Mike Patel is a 83 y.o. male who presents to the emergency department by EMS from his facility for reported hypoxia.  According to EMS, they were told by staff at the nursing facility that the patient has chronic low oxygen of uncertain cause, and that even on oxygen supplementation his oxygen saturation is usually in the 80s.  EMS states they were told that the nursing facility is only able to provide 5 L of nasal cannula oxygen supplementation, and that the patient's typical O2 saturation at the nursing home recently has been in the mid to high 80s on this supplementation.  Today, on morning rounds, he was found to appear more short of breath than usual, and nursing staff obtained an oximeter reading in the 40s.  For this reason EMS was called.  On their arrival, EMS placed the patient on a nonrebreather oxygen facemask, and states that they were only able to get the patient's O2 saturations up into the 80s.  However, he did not appear to be in respiratory distress to them, was pleasantly conversational although confused, and had no complaints.    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  It does appear that the patient was admitted from December 3 through December 11 for a heart failure

## 2024-01-09 NOTE — ED NOTES
ED TO INPATIENT SBAR HANDOFF    Patient Name: Mike Patel   :  1940  83 y.o.   MRN:  [id number]  Preferred Name  Mike  ED Room #:  A07/A07-07  Family/Caregiver Present yes  Restraints no   Sitter no   Sepsis Risk Score Sepsis Risk Score: 4.84    Situation  Code Status: Full Code No additional code details.    Allergies: Glimepiride, Metformin, Sitagliptin, Acetaminophen, Diphenhydramine, and Statins  Weight: Patient Vitals for the past 96 hrs (Last 3 readings):   Weight   24 0825 89.3 kg (196 lb 13.9 oz)     Arrived from: nursing home  Chief Complaint:   Chief Complaint   Patient presents with    Respiratory Distress     Per nursing facility pts sats in 40s. Ems placed on nonrebreather and got readings in 80s. Pt alert and oriented      Hospital Problem/Diagnosis:  Principal Problem:    COVID-19  Resolved Problems:    * No resolved hospital problems. *    Imaging:   XR CHEST PORTABLE   Final Result      1. Residual opacities at left lung base favoring peribronchial thickening.   Etiologies would include reactive airway disease, bronchitis, viral syndrome.   2. Improved appearance of opacities at right lung base.           Abnormal labs:   Abnormal Labs Reviewed   COVID-19 & INFLUENZA COMBO - Abnormal; Notable for the following components:       Result Value    SARS-CoV-2 RNA, RT PCR DETECTED (*)     All other components within normal limits   CBC WITH AUTO DIFFERENTIAL - Abnormal; Notable for the following components:    RBC 3.57 (*)     Hemoglobin 11.3 (*)     Hematocrit 33.8 (*)     Path Consult Flagged (*)     Atypical Lymphocytes Relative 9 (*)     All other components within normal limits   BASIC METABOLIC PANEL W/ REFLEX TO MG FOR LOW K - Abnormal; Notable for the following components:    Sodium 131 (*)     Potassium reflex Magnesium 5.3 (*)     Chloride 97 (*)     Glucose 258 (*)     BUN 27 (*)     All other components within normal limits   BLOOD GAS, VENOUS - Abnormal; Notable for the

## 2024-01-09 NOTE — ED NOTES
This RN in room to administer steroids to patient per order. Pt's wife very upset with this RN giving steroids. Hospitalist at bedside as well. Pt's wife wanting to talk about giving patient antivirals before steroids. This RN told pt's wife the ER doctor ordered the steroids. Diet order also placed so patient can order lunch per wife. Pt on mechanical soft diet. Pt remains on 5L NC at this time. Pt with confusion, but able to follow simple commands.     Sotero Barton, RN  01/09/24 5496

## 2024-01-10 LAB
ANION GAP SERPL CALCULATED.3IONS-SCNC: 17 MMOL/L (ref 3–16)
BASOPHILS # BLD: 0 K/UL (ref 0–0.2)
BASOPHILS NFR BLD: 0 %
BUN SERPL-MCNC: 27 MG/DL (ref 7–20)
CALCIUM SERPL-MCNC: 9.5 MG/DL (ref 8.3–10.6)
CHLORIDE SERPL-SCNC: 98 MMOL/L (ref 99–110)
CO2 SERPL-SCNC: 21 MMOL/L (ref 21–32)
CREAT SERPL-MCNC: 1.1 MG/DL (ref 0.8–1.3)
DEPRECATED RDW RBC AUTO: 15.2 % (ref 12.4–15.4)
EOSINOPHIL # BLD: 0 K/UL (ref 0–0.6)
EOSINOPHIL NFR BLD: 0 %
GFR SERPLBLD CREATININE-BSD FMLA CKD-EPI: >60 ML/MIN/{1.73_M2}
GLUCOSE BLD-MCNC: 124 MG/DL (ref 70–99)
GLUCOSE BLD-MCNC: 134 MG/DL (ref 70–99)
GLUCOSE BLD-MCNC: 181 MG/DL (ref 70–99)
GLUCOSE BLD-MCNC: 280 MG/DL (ref 70–99)
GLUCOSE SERPL-MCNC: 257 MG/DL (ref 70–99)
HCT VFR BLD AUTO: 29.9 % (ref 40.5–52.5)
HGB BLD-MCNC: 10.3 G/DL (ref 13.5–17.5)
LYMPHOCYTES # BLD: 1.1 K/UL (ref 1–5.1)
LYMPHOCYTES NFR BLD: 13 %
MAGNESIUM SERPL-MCNC: 1.5 MG/DL (ref 1.8–2.4)
MCH RBC QN AUTO: 31.5 PG (ref 26–34)
MCHC RBC AUTO-ENTMCNC: 34.3 G/DL (ref 31–36)
MCV RBC AUTO: 91.7 FL (ref 80–100)
MONOCYTES # BLD: 0.7 K/UL (ref 0–1.3)
MONOCYTES NFR BLD: 8 %
NEUTROPHILS # BLD: 6.5 K/UL (ref 1.7–7.7)
NEUTROPHILS NFR BLD: 79 %
PERFORMED ON: ABNORMAL
PLATELET # BLD AUTO: 332 K/UL (ref 135–450)
PMV BLD AUTO: 8.3 FL (ref 5–10.5)
POTASSIUM SERPL-SCNC: 4 MMOL/L (ref 3.5–5.1)
RBC # BLD AUTO: 3.27 M/UL (ref 4.2–5.9)
SODIUM SERPL-SCNC: 136 MMOL/L (ref 136–145)
WBC # BLD AUTO: 8.2 K/UL (ref 4–11)

## 2024-01-10 PROCEDURE — 6360000002 HC RX W HCPCS

## 2024-01-10 PROCEDURE — 2700000000 HC OXYGEN THERAPY PER DAY

## 2024-01-10 PROCEDURE — 6370000000 HC RX 637 (ALT 250 FOR IP)

## 2024-01-10 PROCEDURE — 99221 1ST HOSP IP/OBS SF/LOW 40: CPT | Performed by: NURSE PRACTITIONER

## 2024-01-10 PROCEDURE — 83735 ASSAY OF MAGNESIUM: CPT

## 2024-01-10 PROCEDURE — 6360000002 HC RX W HCPCS: Performed by: STUDENT IN AN ORGANIZED HEALTH CARE EDUCATION/TRAINING PROGRAM

## 2024-01-10 PROCEDURE — 2580000003 HC RX 258

## 2024-01-10 PROCEDURE — 94761 N-INVAS EAR/PLS OXIMETRY MLT: CPT

## 2024-01-10 PROCEDURE — XW0DXM6 INTRODUCTION OF BARICITINIB INTO MOUTH AND PHARYNX, EXTERNAL APPROACH, NEW TECHNOLOGY GROUP 6: ICD-10-PCS | Performed by: INTERNAL MEDICINE

## 2024-01-10 PROCEDURE — 36415 COLL VENOUS BLD VENIPUNCTURE: CPT

## 2024-01-10 PROCEDURE — 80048 BASIC METABOLIC PNL TOTAL CA: CPT

## 2024-01-10 PROCEDURE — 85025 COMPLETE CBC W/AUTO DIFF WBC: CPT

## 2024-01-10 PROCEDURE — 2060000000 HC ICU INTERMEDIATE R&B

## 2024-01-10 RX ORDER — BISACODYL 10 MG
10 SUPPOSITORY, RECTAL RECTAL DAILY PRN
COMMUNITY

## 2024-01-10 RX ORDER — HYDROXYZINE HYDROCHLORIDE 25 MG/1
25 TABLET, FILM COATED ORAL EVERY 6 HOURS PRN
COMMUNITY

## 2024-01-10 RX ORDER — MAGNESIUM SULFATE IN WATER 40 MG/ML
2000 INJECTION, SOLUTION INTRAVENOUS ONCE
Status: COMPLETED | OUTPATIENT
Start: 2024-01-10 | End: 2024-01-10

## 2024-01-10 RX ORDER — INSULIN GLARGINE 100 [IU]/ML
12 INJECTION, SOLUTION SUBCUTANEOUS NIGHTLY
Status: DISCONTINUED | OUTPATIENT
Start: 2024-01-10 | End: 2024-01-19 | Stop reason: HOSPADM

## 2024-01-10 RX ORDER — HALOPERIDOL 5 MG/ML
2 INJECTION INTRAMUSCULAR
Status: ACTIVE | OUTPATIENT
Start: 2024-01-10 | End: 2024-01-11

## 2024-01-10 RX ORDER — OLANZAPINE 2.5 MG/1
2.5 TABLET, FILM COATED ORAL NIGHTLY
Status: ON HOLD | COMMUNITY
End: 2024-01-18

## 2024-01-10 RX ORDER — MIDODRINE HYDROCHLORIDE 5 MG/1
5 TABLET ORAL ONCE
Status: DISCONTINUED | OUTPATIENT
Start: 2024-01-10 | End: 2024-01-10

## 2024-01-10 RX ORDER — INSULIN LISPRO 100 [IU]/ML
8 INJECTION, SOLUTION INTRAVENOUS; SUBCUTANEOUS
Status: DISCONTINUED | OUTPATIENT
Start: 2024-01-10 | End: 2024-01-13

## 2024-01-10 RX ADMIN — INSULIN GLARGINE 12 UNITS: 100 INJECTION, SOLUTION SUBCUTANEOUS at 21:39

## 2024-01-10 RX ADMIN — INSULIN LISPRO 4 UNITS: 100 INJECTION, SOLUTION INTRAVENOUS; SUBCUTANEOUS at 09:09

## 2024-01-10 RX ADMIN — MAGNESIUM SULFATE HEPTAHYDRATE 2000 MG: 40 INJECTION, SOLUTION INTRAVENOUS at 11:53

## 2024-01-10 RX ADMIN — FUROSEMIDE 20 MG: 10 INJECTION, SOLUTION INTRAMUSCULAR; INTRAVENOUS at 09:10

## 2024-01-10 RX ADMIN — RIVAROXABAN 20 MG: 20 TABLET, FILM COATED ORAL at 17:33

## 2024-01-10 RX ADMIN — DEXAMETHASONE SODIUM PHOSPHATE 6 MG: 10 INJECTION INTRAMUSCULAR; INTRAVENOUS at 18:09

## 2024-01-10 RX ADMIN — INSULIN LISPRO 8 UNITS: 100 INJECTION, SOLUTION INTRAVENOUS; SUBCUTANEOUS at 17:32

## 2024-01-10 RX ADMIN — SODIUM CHLORIDE, PRESERVATIVE FREE 10 ML: 5 INJECTION INTRAVENOUS at 21:42

## 2024-01-10 RX ADMIN — INSULIN LISPRO 8 UNITS: 100 INJECTION, SOLUTION INTRAVENOUS; SUBCUTANEOUS at 12:29

## 2024-01-10 RX ADMIN — FUROSEMIDE 20 MG: 10 INJECTION, SOLUTION INTRAMUSCULAR; INTRAVENOUS at 18:09

## 2024-01-10 RX ADMIN — SODIUM CHLORIDE, PRESERVATIVE FREE 10 ML: 5 INJECTION INTRAVENOUS at 09:10

## 2024-01-10 RX ADMIN — BARICITINIB 4 MG: 2 TABLET, FILM COATED ORAL at 14:16

## 2024-01-10 ASSESSMENT — PAIN SCALES - PAIN ASSESSMENT IN ADVANCED DEMENTIA (PAINAD)
NEGVOCALIZATION: 0
BREATHING: 0
BREATHING: 0
CONSOLABILITY: 0
BODYLANGUAGE: 0
FACIALEXPRESSION: 0
TOTALSCORE: 0
NEGVOCALIZATION: 0
CONSOLABILITY: 0
TOTALSCORE: 0
FACIALEXPRESSION: 0
BODYLANGUAGE: 0

## 2024-01-10 ASSESSMENT — PAIN SCALES - GENERAL
PAINLEVEL_OUTOF10: 0

## 2024-01-10 ASSESSMENT — PAIN SCALES - WONG BAKER
WONGBAKER_NUMERICALRESPONSE: 0
WONGBAKER_NUMERICALRESPONSE: 0

## 2024-01-10 NOTE — PLAN OF CARE
Problem: Respiratory - Adult  Goal: Achieves optimal ventilation and oxygenation  1/10/2024 1848 by Ezequiel Decker RN  Outcome: Not Progressing  Flowsheets (Taken 1/10/2024 1848)  Achieves optimal ventilation and oxygenation:   Assess for changes in respiratory status   Assess for changes in mentation and behavior   Position to facilitate oxygenation and minimize respiratory effort   Oxygen supplementation based on oxygen saturation or arterial blood gases  Note: Pt has maintained SpO2 > 92% on 12L HFNC with no complaints of shortness of breath/dyspnea and respiratory rate 18-20 breaths/min. Pt does experience dyspnea with exertion.      Problem: Safety - Medical Restraint  Goal: Remains free of injury from restraints (Restraint for Interference with Medical Device)  Description: INTERVENTIONS:  1. Determine that other, less restrictive measures have been tried or would not be effective before applying the restraint  2. Evaluate the patient's condition at the time of restraint application  3. Inform patient/family regarding the reason for restraint  4. Q2H: Monitor safety, psychosocial status, comfort, nutrition and hydration  1/10/2024 1848 by Ezequiel Decker RN  Outcome: Progressing  Flowsheets  Taken 1/10/2024 1848 by Ezequiel Decker RN  Remains free of injury from restraints (restraint for interference with medical device): Determine that other, less restrictive measures have been tried or would not be effective before applying the restraint  Taken 1/10/2024 0700 by Shayna Little RN  Remains free of injury from restraints (restraint for interference with medical device):   Determine that other, less restrictive measures have been tried or would not be effective before applying the restraint   Evaluate the patient's condition at the time of restraint application   Inform patient/family regarding the reason for restraint   Every 2 hours: Monitor safety, psychosocial status, comfort, nutrition and

## 2024-01-10 NOTE — FLOWSHEET NOTE
Admitted pt from ED, he is fully awake, confused, oriented to persons only. He has ongoing O2 inhalation of 5 L/min via nasal cannula. He is tachypneic on exertion. Made him comfortable on bed but noted pt as anxious and agitated. Vital signs checked and recorded. Physical assessment done.   01/09/24 2131   Vitals   Temp 97.8 °F (36.6 °C)   Temp Source Oral   Pulse 77   Respirations 23   /60   MAP (Calculated) 75   BP Location Right upper arm   BP Upper/Lower Upper   BP Method Automatic   Patient Position Semi fowlers   Pain Assessment   Pain Assessment None - Denies Pain   Oxygen Therapy   SpO2 97 %   Pulse Oximetry Type Intermittent   Pulse Oximeter Device Mode Intermittent   Pulse Oximeter Device Location Right;Finger   O2 Device Nasal cannula   O2 Flow Rate (L/min) 5 L/min

## 2024-01-10 NOTE — PROGRESS NOTES
4 Eyes Skin Assessment     NAME:  Mike Patel  YOB: 1940  MEDICAL RECORD NUMBER:  [id number]    The patient is being assessed for  Admission    I agree that at least one RN has performed a thorough Head to Toe Skin Assessment on the patient. ALL assessment sites listed below have been assessed.      Areas assessed by both nurses:    Head, Face, Ears, Shoulders, Back, Chest, Arms, Elbows, Hands, Sacrum. Buttock, Coccyx, Ischium, Legs. Feet and Heels, Under Medical Devices , and Other Blanchable redness to buttocks, and bilateral heels.        Does the Patient have a Wound? No noted wound(s)       Sushant Prevention initiated by RN: Yes  Wound Care Orders initiated by RN: N/A    Pressure Injury (Stage 3,4, Unstageable, DTI, NWPT, and Complex wounds) if present, place Wound referral order by RN under : No    New Ostomies, if present place, Ostomy referral order under : No     Nurse 1 eSignature: Electronically signed by Ezequiel Decker RN on 1/10/24 at 5:57 PM EST    **SHARE this note so that the co-signing nurse can place an eSignature**    Nurse 2 eSignature: Electronically signed by Bre Davalos RN on 1/11/24 at 12:50 AM EST

## 2024-01-10 NOTE — PROGRESS NOTES
ICU/Internal Medicine Note    Patient Name: Mike Patel   Admit Date: 1/9/2024   Code:Full Code  PCP: Mika Orellana MD   Attending: Vonda Magaña MD    Chief Complaint: Respiratory Distress (Per nursing facility pts sats in 40s. Ems placed on nonrebreather and got readings in 80s. Pt alert and oriented )       Subjective   Interval History:    No acute events overnight  Patient is seen on bedside. No acute distress  -No active complaint  Denies any headache,  difficulty swallowing, cough, SOB, chest pain or tightness, fever, chill, night sweats, palpitation, ABD pain, nausea, vomiting, bowel and urinary habit changes, leg swelling,  and change in sleep and appetite.    Patient is vitally stable, still on L o2, no documented urine output      HPI:   Mike Patel is a 83 y.o. 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    ROS:  As per HPI    ED Course:  Oriented x 3, tachypneic  Vitals: BP of 132/91, HR of 118, Temp of afebrile, SpO2 of 93% on 15 L of oxygen  Physical Exam: Tachycardic, tachypneic, no wheezing, some basilar crackles  EKG: Normal sinus rhythm without any ischemic changes  Labs: Hypokalemia of 5.3, BUN/creatinine normal range, lactic acidosis of 3.4 without anion gap, proBNP elevated to 734 with baseline of 400, trops 51, CBC WNL, COVID-positive, VBG showed mild hypoxic and hypercapnic respiratory failure with pH of 7.3 and pCO2 of 52.4 later improved to 32.4  Imaging: CXR shows residual opacities at the left lung bases, improved appearance of opacities of right lung base.  Treatment: Dexamethasone    Summary of Clinical Encounters:  12-17/12/2023: Hospital admission.  Presented with dizziness lightheadedness diagnosed with FAWAD due to overdiuresis, pulmonology consulted and hypoxia was attributed to COVID-related he  hypercapnic respiratory failure  Likely secondary to recurrent COVID-pneumonia  Presented with hypoxia, rales on chest auscultation and lower extremity edema, hypercapnia on VBG  Initially required 15 L oxygen now down to 5 L with saturation of 93%  Still on 5L of 02  Continue oxygen as needed  Continue DuoNebs    Recurrent vs prolonged COVID-pneumonia  Resident of skilled nursing facility, presented with hypoxia, COVID-positive on lab test.  S/p dose of dexamethasone in ED  Continue dexamethasone 6 mg daily for 10 days  Continue oxygen as needed    Acute exacerbation of diastolic heart failure  Hx of heart failure presented with shortness of breath, hypoxia, bilateral crackles on chest auscultation and lower extremity edema labs showed proBNP elevated  No documented urine output  Continue Lasix 20 mg IV twice daily  BMP daily  Strict I's and O's  Keep magnesium> 2 and> 4    Delirium  Likely secondary to the Infection and HF  Baseline mentation problem   Was in delirium last night s/p Quetiapine  Please re-orient  patient every 3-4 hours  Please open windows and give day light  Try Haldol or Zyprexa if non pharmacological measure are not working      Chronic Medical Conditions    DMT2 most recent HbA1c is 9.1 on 12/13/2023  On insulin long-acting 30 units nightly at home  Continue insulin [id number] units nightly  Start premeal insulin lispro 8 U  Low-dose sliding scale  Hypoglycemia protocol    DVT PE unprovoked  Continue Xarelto    B-cell lymphoma in remission  S/p cart therapy  Continue to monitor      DVT PPx: Xorelto  Diet: ADULT DIET; Dysphagia - Soft and Bite Sized; Low Sodium (2 gm)   Code status:  Full Code   ELOS: 3 days  Barriers to discharge: Hypoxia and mentation  Disposition  - Preadmission: SNF  - Upon discharge: To be discussed    Will discuss with attending physician Vonda Magaña MD    The note was completed using EMR and Dragon dictation system. Every effort was made to ensure accuracy; however,

## 2024-01-10 NOTE — PLAN OF CARE
Problem: Discharge Planning  Goal: Discharge to home or other facility with appropriate resources  1/10/2024 1136 by Tawny Adam, RN  Outcome: Progressing     Problem: Respiratory - Adult  Goal: Achieves optimal ventilation and oxygenation  1/10/2024 1136 by Tawny Adam, RN  Outcome: Progressing

## 2024-01-10 NOTE — CARE COORDINATION
Alberta met with pt's wife and palliative in [facility]. Pt from [facility]. Pt's wife has been asked to have private duty caregivers for pt since he is confused. She is upset and wants pt to go to SNF and then memory care. She would hope for pt to go to a facility that can provide both. She cannot afford to private pay and is agreeable to talk to someone regarding applying for medicaid for pt.     ALBERTA spoke with Luis Fernando in Public Benefits. She will meet with pt's wife tomorrow morning.     ALBERTA left message for Felipa at [facility] to determine how many SNF days left and if they have attempted pending medicaid.    ALBERTA met with wife, Marissa. Provided SNF list and Memory Care list. She is aware ALBERTA has not spoke with [facility] at this time. Informed Luis Fernando Ann with [employer] will meet with her in the morning. Marissa will review list and will make 4-5 choices. ALBERTA will met with Marissa tomorrow.

## 2024-01-10 NOTE — PLAN OF CARE
Problem: Discharge Planning  Goal: Discharge to home or other facility with appropriate resources  1/10/2024 0645 by Shayna Little RN  Outcome: Progressing     Problem: Respiratory - Adult  Goal: Achieves optimal ventilation and oxygenation  1/10/2024 0645 by Shayna Little RN  Outcome: Progressing  Flowsheets (Taken 1/10/2024 0645)  Achieves optimal ventilation and oxygenation:   Assess for changes in respiratory status   Assess for changes in mentation and behavior   Position to facilitate oxygenation and minimize respiratory effort   Oxygen supplementation based on oxygen saturation or arterial blood gases     Problem: Cardiovascular - Adult  Goal: Maintains optimal cardiac output and hemodynamic stability  1/10/2024 0645 by Shayna Little RN  Flowsheets (Taken 1/10/2024 0645)  Maintains optimal cardiac output and hemodynamic stability:   Monitor blood pressure and heart rate   Monitor urine output and notify Licensed Independent Practitioner for values outside of normal range   Assess for signs of decreased cardiac output   Administer fluid and/or volume expanders as ordered   Administer vasoactive medications as ordered     Problem: Safety - Medical Restraint  Goal: Remains free of injury from restraints (Restraint for Interference with Medical Device)  Description: INTERVENTIONS:  1. Determine that other, less restrictive measures have been tried or would not be effective before applying the restraint  2. Evaluate the patient's condition at the time of restraint application  3. Inform patient/family regarding the reason for restraint  4. Q2H: Monitor safety, psychosocial status, comfort, nutrition and hydration  1/10/2024 0645 by Shayna Little RN  Outcome: Progressing  Flowsheets  Taken 1/10/2024 0645  Remains free of injury from restraints (restraint for interference with medical device):   Determine that other, less restrictive measures have been tried or would not be effective before

## 2024-01-10 NOTE — PROGRESS NOTES
4 Eyes Skin Assessment     NAME:  Mike Patel  YOB: 1940  MEDICAL RECORD NUMBER:  [id number]    The patient is being assessed for  Admission    I agree that at least one RN has performed a thorough Head to Toe Skin Assessment on the patient. ALL assessment sites listed below have been assessed.      Areas assessed by both nurses:    Head, Face, Ears, Shoulders, Back, Chest, Arms, Elbows, Hands, Sacrum. Buttock, Coccyx, Ischium, and Legs. Feet and Heels        Does the Patient have a Wound? Yes wound(s) were present on assessment. LDA wound assessment was Initiated and completed by RN       Sushant Prevention initiated by RN: Yes  Wound Care Orders initiated by RN: No    Pressure Injury (Stage 3,4, Unstageable, DTI, NWPT, and Complex wounds) if present, place Wound referral order by RN under : No    New Ostomies, if present place, Ostomy referral order under : No     Nurse 1 eSignature: Electronically signed by Shayna Little RN on 1/10/24 at 1:52 AM EST    **SHARE this note so that the co-signing nurse can place an eSignature**    Nurse 2 eSignature: Electronically signed by Alan Gore RN on 1/10/24 at 3:25 AM EST

## 2024-01-10 NOTE — PROGRESS NOTES
Pt is confused continuously talking, agitated, anxious, removing his clothes and diaper, and pulling out his external cath. He was getting out from bed, high risk for fall. Informed to on-call, Princess Curran, STEF , she ordered to give zyprexa  and apply non-violent restrain to keep pt safe.Wife- Marissa Patel( primary decision maker) made aware through phone call and agreed.

## 2024-01-10 NOTE — DISCHARGE INSTRUCTIONS
Extra Heart Failure sites:     https://[employer].com/publication/?s=992232   --- this is American Heart Association interactive Healthier Living with Heart Failure guidebook.  Please click hyperlink or copy / paste link into search bar. Use your mouse to scroll through the pages.  Lots of information about weight monitoring, diet tips, activity, meds, etc    HF Daggett carlton  -- this is a free smart phone carlton available for iPhone and Android download.  Use your phone to track sodium / fluid intake, zone tool symptom tracking, weights, medications, etc. Click on this hyperlink  HF Daggett Carlton   for QR code for easy download.      DASH (Dietary Approach to Stop Hypertension) diet --  https://www.nhlbi.nih.gov/education/dash-eating-plan -- this diet is a flexible eating plan that promotes heart healthy eating style.  Click on hyperlink or copy / paste link into search bar.  Lots of low sodium recipes and tips.    https://www.[employer].QoL Meds/recipes  -- more free recipes

## 2024-01-10 NOTE — CONSULTS
[facility]  Palliative Medicine Consultation Note      Date Of Admission:1/9/2024  Date of consult: 01/10/24  Seen by PC in the past:  Yes    Recommendations:        Pt is familiar to palliative care from a recent admission. Today, he and his wife at bedside are very tearful. They have apparently had numerous family members die in the past month from COVID. Wife states \"and now I feel like I'm losing my .\" The pt has been delirious and agitated, and wife was distraught at seeing him in restraints. We discussed in the north that he has now been admitted to the hospital 4 times in the past 2 months. Wife has not been happy with care in the hospital or nursing facilities. She has financial concerns about him needing long term care with private pay, and feels he's being \"kicked out\" of the SNF. Pt's wife reports that the pt is DNR/DNI, which is consistent with past admission. Offered emotional support and d/w ALBERTA Phelps and Dr. Behram Khan.    1. Goals of Care/Advanced Care planning/Code status: changed to DNR/DNI (Limited- no to all interventions). Pt/wife hope he can continue SNF and then transition to a memory care unit. Wife feels she can't care for him at home any longer.  2. Pain: denies pain  3. SOB: pt p/w respiratory distress and required up to 15 L oxygen, now on 12 L. He's admitted with recurrent COVID pneumonia and is also being diuresed with 20 mg lasix bid.  4. Delirium: pt is very tearful today and confused, oriented to himself/hospital only. He is aware of some family members dying recently from COVID, and seems to be very sad and crying about that. Recommend haldol prn or zyprexa prn, and hope to keep pt out of physical restraints.  5. Disposition: d/c to SNF when medically ready    Reason for Consult:         []  Goals of Care  []  Code Status Discussion/Advanced Care Planning   []  Psychosocial/Family Support  []  Symptom Management  [x]  Other - HF readmission    Requesting

## 2024-01-11 ENCOUNTER — APPOINTMENT (OUTPATIENT)
Dept: GENERAL RADIOLOGY | Age: 84
End: 2024-01-11
Payer: MEDICARE

## 2024-01-11 LAB
ALBUMIN SERPL-MCNC: 3 G/DL (ref 3.4–5)
ALBUMIN/GLOB SERPL: 1.1 {RATIO} (ref 1.1–2.2)
ALP SERPL-CCNC: 86 U/L (ref 40–129)
ALT SERPL-CCNC: 12 U/L (ref 10–40)
ANION GAP SERPL CALCULATED.3IONS-SCNC: 12 MMOL/L (ref 3–16)
AST SERPL-CCNC: 21 U/L (ref 15–37)
BASOPHILS # BLD: 0 K/UL (ref 0–0.2)
BASOPHILS NFR BLD: 0 %
BILIRUB SERPL-MCNC: 0.3 MG/DL (ref 0–1)
BUN SERPL-MCNC: 27 MG/DL (ref 7–20)
CALCIUM SERPL-MCNC: 8.9 MG/DL (ref 8.3–10.6)
CHLORIDE SERPL-SCNC: 96 MMOL/L (ref 99–110)
CO2 SERPL-SCNC: 26 MMOL/L (ref 21–32)
CREAT SERPL-MCNC: 1.1 MG/DL (ref 0.8–1.3)
CRP SERPL-MCNC: 6.8 MG/L (ref 0–5.1)
DEPRECATED RDW RBC AUTO: 15.3 % (ref 12.4–15.4)
EOSINOPHIL # BLD: 0 K/UL (ref 0–0.6)
EOSINOPHIL NFR BLD: 0 %
GFR SERPLBLD CREATININE-BSD FMLA CKD-EPI: >60 ML/MIN/{1.73_M2}
GLUCOSE BLD-MCNC: 144 MG/DL (ref 70–99)
GLUCOSE BLD-MCNC: 200 MG/DL (ref 70–99)
GLUCOSE BLD-MCNC: 205 MG/DL (ref 70–99)
GLUCOSE BLD-MCNC: 81 MG/DL (ref 70–99)
GLUCOSE SERPL-MCNC: 219 MG/DL (ref 70–99)
HCT VFR BLD AUTO: 30 % (ref 40.5–52.5)
HGB BLD-MCNC: 10.1 G/DL (ref 13.5–17.5)
LACTATE BLDV-SCNC: 1.2 MMOL/L (ref 0.4–2)
LYMPHOCYTES # BLD: 1 K/UL (ref 1–5.1)
LYMPHOCYTES NFR BLD: 15 %
MAGNESIUM SERPL-MCNC: 2 MG/DL (ref 1.8–2.4)
MCH RBC QN AUTO: 30.6 PG (ref 26–34)
MCHC RBC AUTO-ENTMCNC: 33.6 G/DL (ref 31–36)
MCV RBC AUTO: 91.1 FL (ref 80–100)
MONOCYTES # BLD: 0.3 K/UL (ref 0–1.3)
MONOCYTES NFR BLD: 4 %
NEUTROPHILS # BLD: 5.6 K/UL (ref 1.7–7.7)
NEUTROPHILS NFR BLD: 81 %
PERFORMED ON: ABNORMAL
PERFORMED ON: NORMAL
PLATELET # BLD AUTO: 356 K/UL (ref 135–450)
PLATELET BLD QL SMEAR: ADEQUATE
PMV BLD AUTO: 8 FL (ref 5–10.5)
POTASSIUM SERPL-SCNC: 4.6 MMOL/L (ref 3.5–5.1)
PROCALCITONIN SERPL IA-MCNC: 0.03 NG/ML (ref 0–0.15)
PROT SERPL-MCNC: 5.8 G/DL (ref 6.4–8.2)
RBC # BLD AUTO: 3.29 M/UL (ref 4.2–5.9)
RBC MORPH BLD: NORMAL
SLIDE REVIEW: ABNORMAL
SODIUM SERPL-SCNC: 134 MMOL/L (ref 136–145)
WBC # BLD AUTO: 6.9 K/UL (ref 4–11)

## 2024-01-11 PROCEDURE — 87040 BLOOD CULTURE FOR BACTERIA: CPT

## 2024-01-11 PROCEDURE — 86140 C-REACTIVE PROTEIN: CPT

## 2024-01-11 PROCEDURE — 6360000002 HC RX W HCPCS: Performed by: STUDENT IN AN ORGANIZED HEALTH CARE EDUCATION/TRAINING PROGRAM

## 2024-01-11 PROCEDURE — 80053 COMPREHEN METABOLIC PANEL: CPT

## 2024-01-11 PROCEDURE — 83735 ASSAY OF MAGNESIUM: CPT

## 2024-01-11 PROCEDURE — 84145 PROCALCITONIN (PCT): CPT

## 2024-01-11 PROCEDURE — 85025 COMPLETE CBC W/AUTO DIFF WBC: CPT

## 2024-01-11 PROCEDURE — 94761 N-INVAS EAR/PLS OXIMETRY MLT: CPT

## 2024-01-11 PROCEDURE — 71045 X-RAY EXAM CHEST 1 VIEW: CPT

## 2024-01-11 PROCEDURE — 97166 OT EVAL MOD COMPLEX 45 MIN: CPT

## 2024-01-11 PROCEDURE — 83605 ASSAY OF LACTIC ACID: CPT

## 2024-01-11 PROCEDURE — 2580000003 HC RX 258

## 2024-01-11 PROCEDURE — 36415 COLL VENOUS BLD VENIPUNCTURE: CPT

## 2024-01-11 PROCEDURE — 6360000002 HC RX W HCPCS

## 2024-01-11 PROCEDURE — 97162 PT EVAL MOD COMPLEX 30 MIN: CPT

## 2024-01-11 PROCEDURE — 6370000000 HC RX 637 (ALT 250 FOR IP)

## 2024-01-11 PROCEDURE — 97535 SELF CARE MNGMENT TRAINING: CPT

## 2024-01-11 PROCEDURE — 2060000000 HC ICU INTERMEDIATE R&B

## 2024-01-11 PROCEDURE — 2700000000 HC OXYGEN THERAPY PER DAY

## 2024-01-11 PROCEDURE — 97530 THERAPEUTIC ACTIVITIES: CPT

## 2024-01-11 RX ORDER — SERTRALINE HYDROCHLORIDE 25 MG/1
25 TABLET, FILM COATED ORAL DAILY
Status: DISCONTINUED | OUTPATIENT
Start: 2024-01-11 | End: 2024-01-19 | Stop reason: HOSPADM

## 2024-01-11 RX ORDER — FUROSEMIDE 10 MG/ML
40 INJECTION INTRAMUSCULAR; INTRAVENOUS 2 TIMES DAILY
Status: DISCONTINUED | OUTPATIENT
Start: 2024-01-11 | End: 2024-01-12

## 2024-01-11 RX ORDER — LEVOFLOXACIN 500 MG/1
500 TABLET, FILM COATED ORAL DAILY
Status: DISCONTINUED | OUTPATIENT
Start: 2024-01-11 | End: 2024-01-12

## 2024-01-11 RX ADMIN — INSULIN LISPRO 2 UNITS: 100 INJECTION, SOLUTION INTRAVENOUS; SUBCUTANEOUS at 08:56

## 2024-01-11 RX ADMIN — SODIUM CHLORIDE, PRESERVATIVE FREE 10 ML: 5 INJECTION INTRAVENOUS at 09:02

## 2024-01-11 RX ADMIN — INSULIN LISPRO 2 UNITS: 100 INJECTION, SOLUTION INTRAVENOUS; SUBCUTANEOUS at 12:48

## 2024-01-11 RX ADMIN — SERTRALINE HYDROCHLORIDE 25 MG: 25 TABLET ORAL at 16:49

## 2024-01-11 RX ADMIN — RIVAROXABAN 20 MG: 20 TABLET, FILM COATED ORAL at 16:49

## 2024-01-11 RX ADMIN — FUROSEMIDE 40 MG: 10 INJECTION, SOLUTION INTRAMUSCULAR; INTRAVENOUS at 16:49

## 2024-01-11 RX ADMIN — INSULIN GLARGINE 12 UNITS: 100 INJECTION, SOLUTION SUBCUTANEOUS at 21:51

## 2024-01-11 RX ADMIN — BARICITINIB 4 MG: 2 TABLET, FILM COATED ORAL at 11:53

## 2024-01-11 RX ADMIN — INSULIN LISPRO 8 UNITS: 100 INJECTION, SOLUTION INTRAVENOUS; SUBCUTANEOUS at 12:48

## 2024-01-11 RX ADMIN — DEXAMETHASONE SODIUM PHOSPHATE 6 MG: 10 INJECTION INTRAMUSCULAR; INTRAVENOUS at 18:27

## 2024-01-11 RX ADMIN — INSULIN LISPRO 8 UNITS: 100 INJECTION, SOLUTION INTRAVENOUS; SUBCUTANEOUS at 18:27

## 2024-01-11 RX ADMIN — LEVOFLOXACIN 500 MG: 500 TABLET, FILM COATED ORAL at 16:50

## 2024-01-11 RX ADMIN — SODIUM CHLORIDE, PRESERVATIVE FREE 10 ML: 5 INJECTION INTRAVENOUS at 21:51

## 2024-01-11 RX ADMIN — INSULIN LISPRO 8 UNITS: 100 INJECTION, SOLUTION INTRAVENOUS; SUBCUTANEOUS at 08:56

## 2024-01-11 RX ADMIN — FUROSEMIDE 20 MG: 10 INJECTION, SOLUTION INTRAMUSCULAR; INTRAVENOUS at 08:57

## 2024-01-11 ASSESSMENT — PAIN SCALES - GENERAL
PAINLEVEL_OUTOF10: 0
PAINLEVEL_OUTOF10: 0

## 2024-01-11 NOTE — PLAN OF CARE
Problem: Discharge Planning  Goal: Discharge to home or other facility with appropriate resources  1/11/2024 0045 by Bre Davalos RN  Outcome: Progressing  Flowsheets (Taken 1/11/2024 0045)  Discharge to home or other facility with appropriate resources:   Identify barriers to discharge with patient and caregiver   Identify discharge learning needs (meds, wound care, etc)   Refer to discharge planning if patient needs post-hospital services based on physician order or complex needs related to functional status, cognitive ability or social support system   Arrange for interpreters to assist at discharge as needed     Problem: Respiratory - Adult  Goal: Achieves optimal ventilation and oxygenation  1/11/2024 0045 by Bre Davalos RN  Outcome: Progressing  Flowsheets (Taken 1/11/2024 0045)  Achieves optimal ventilation and oxygenation:   Assess for changes in respiratory status   Position to facilitate oxygenation and minimize respiratory effort   Assess the need for suctioning and aspirate as needed   Respiratory therapy support as indicated   Assess for changes in mentation and behavior   Oxygen supplementation based on oxygen saturation or arterial blood gases   Assess and instruct to report shortness of breath or any respiratory difficulty   Encourage broncho-pulmonary hygiene including cough, deep breathe, incentive spirometry     Problem: Cardiovascular - Adult  Goal: Maintains optimal cardiac output and hemodynamic stability  Outcome: Progressing  Flowsheets (Taken 1/11/2024 0045)  Maintains optimal cardiac output and hemodynamic stability:   Monitor blood pressure and heart rate   Assess for signs of decreased cardiac output   Administer vasoactive medications as ordered   Monitor urine output and notify Licensed Independent Practitioner for values outside of normal range   Administer fluid and/or volume expanders as ordered     Problem: Respiratory - Adult  Goal: Achieves optimal

## 2024-01-11 NOTE — PLAN OF CARE
Problem: Respiratory - Adult  Goal: Achieves optimal ventilation and oxygenation  1/11/2024 1324 by Elma Zheng, RN  Outcome: Progressing  Flowsheets (Taken 1/11/2024 1324)  Achieves optimal ventilation and oxygenation:   Assess for changes in respiratory status   Assess for changes in mentation and behavior   Oxygen supplementation based on oxygen saturation or arterial blood gases   Position to facilitate oxygenation and minimize respiratory effort  Note: Pts O2 requirements have been labile this shift. When patient is resting and calm, pt is weaned to 6 L HF NC, with minimal exertion, pt requires additional O2, needing to be increased to 15 L when ambulated to chair. Was assisted back to bed and weaned to 6 L. MDs aware. Will continue to monitor and wean as able.   1/11/2024 0045 by Bre Davalos, RN  Outcome: Progressing  Flowsheets (Taken 1/11/2024 0045)  Achieves optimal ventilation and oxygenation:   Assess for changes in respiratory status   Position to facilitate oxygenation and minimize respiratory effort   Assess the need for suctioning and aspirate as needed   Respiratory therapy support as indicated   Assess for changes in mentation and behavior   Oxygen supplementation based on oxygen saturation or arterial blood gases   Assess and instruct to report shortness of breath or any respiratory difficulty   Encourage broncho-pulmonary hygiene including cough, deep breathe, incentive spirometry     Problem: Safety - Adult  Goal: Free from fall injury  Outcome: Progressing  Note: Sitted at bedside for impulsivity and agitation. Will continue to monitor.      Problem: Confusion  Goal: Confusion, delirium, dementia, or psychosis is improved or at baseline  Description: INTERVENTIONS:  1. Assess for possible contributors to thought disturbance, including medications, impaired vision or hearing, underlying metabolic abnormalities, dehydration, psychiatric diagnoses, and notify attending LIP  2.

## 2024-01-11 NOTE — PROGRESS NOTES
awareness of deficits  Initiation: Requires cues for all  Sequencing: Requires cues for all  Cognition Comment: Poor cognition, anxious about hospitalization/mobility/health condition  Orientation  Overall Orientation Status: Impaired  Orientation Level: Oriented to person;Disoriented to time;Disoriented to place;Disoriented to situation                  Education Given To: Patient (wife)  Education Provided: Role of Therapy;Plan of Care;ADL Adaptive Strategies;Transfer Training;Precautions  Education Method: Verbal  Barriers to Learning: None (patient unable to demo learning)  Education Outcome: Verbalized understanding               AM-PAC - ADL  AM-PAC Daily Activity - Inpatient   How much help is needed for putting on and taking off regular lower body clothing?: Total  How much help is needed for bathing (which includes washing, rinsing, drying)?: Total  How much help is needed for toileting (which includes using toilet, bedpan, or urinal)?: Total  How much help is needed for putting on and taking off regular upper body clothing?: A Lot  How much help is needed for taking care of personal grooming?: A Little  How much help for eating meals?: A Little  AM-[facility] Inpatient Daily Activity Raw Score: 11  AM-PAC Inpatient ADL T-Scale Score : 29.04  ADL Inpatient CMS 0-100% Score: 70.42  ADL Inpatient CMS G-Code Modifier : CL         Goals  Short Term Goals  Time Frame for Short Term Goals: by discharge  Short Term Goal 1: tolerate EOB sitting during functional activity with SBA and stable vitals x15 minutes  Short Term Goal 2: perform stance with SBA x2 minutes in preparation for ADLs  Short Term Goal 3: complete BSC/commode transfer with SBA using LRAD  Short Term Goal 4: perform 1x10-15 BUE exercises with min  A  Patient Goals   Patient goals : none stated       Therapy Time   Individual Concurrent Group Co-treatment   Time In 0940         Time Out 1018         Minutes 38         Timed Code Treatment Minutes: 23

## 2024-01-11 NOTE — CARE COORDINATION
CM attempted to call wife to discuss DCP, no answer. HIPAA compliant VM left with call back info.    [id number]:  CM spoke with pt/wife at bedside regarding DCP- wife states she has not looked at list yet. CM requested for her to discuss the list with dgtr tonight and get back to me with referral options by tomorrow morning. She verbalized understanding- CM provided phone number and email.    Thank you  Cat Mark RUGGIERO, BSN, [facility]   [phone]

## 2024-01-11 NOTE — PROGRESS NOTES
Comprehensive Nutrition Assessment    RECOMMENDATIONS:  PO Diet: Continue dysphagia - soft and bite sized diet  ONS: Add Glucerna BID  Nutrition Education: Education not indicated     NUTRITION ASSESSMENT:   Nutritional summary & status: Positive screen for wt loss, pt has had a 14% wt loss in the past 3 months. He has been declining for the past few months w/ multiple hospitalizations. Currently in droplet plus isolation precautions, unable to assess. On a dysphagia - soft and bite sized diet w/ 100% PO intake on all meals. Palliative on board, code status changed and wife discussing POC and likely d/c to facility.   Admission // PMH: COVID//HF, T2DM, acute hypoxic respiratory failure    MALNUTRITION ASSESSMENT  Context of Malnutrition: Chronic Illness   Malnutrition Status: Insufficient data  Findings of the 6 clinical characteristics of malnutrition (Minimum of 2 out of 6 clinical characteristics is required to make the diagnosis of moderate or severe Protein Calorie Malnutrition based on AND/ASPEN Guidelines):  Energy Intake:  Unable to assess  Weight Loss:  Greater than 7.5% over 3 months     Body Fat Loss:  Unable to assess     Muscle Mass Loss:  Unable to assess    Fluid Accumulation:  No significant fluid accumulation      NUTRITION DIAGNOSIS   Inadequate protein-energy intake related to acute injury/trauma as evidenced by weight loss    Nutrition Monitoring and Evaluation:   Food/Nutrient Intake Outcomes:  Food and Nutrient Intake, Supplement Intake  Physical Signs/Symptoms Outcomes:  Biochemical Data, Nutrition Focused Physical Findings, Weight, Hemodynamic Status     OBJECTIVE DATA: Significant to nutrition assessment  Nutrition Related Findings: . Na 134. Active bowel sounds. Trace DAVIS.  Wounds: None  Nutrition Goals: Meet at least 75% of estimated needs     CURRENT NUTRITION THERAPIES  ADULT DIET; Dysphagia - Soft and Bite Sized; Low Sodium (2 gm)  PO Intake: %   PO Supplement Intake:None

## 2024-01-11 NOTE — PROGRESS NOTES
ICU/Internal Medicine Note    Patient Name: Mike Patel   Admit Date: 1/9/2024   Code:Limited  PCP: Mika Orellana MD   Attending: Fernando Johnston MD    Chief Complaint: Respiratory Distress (Per nursing facility pts sats in 40s. Ems placed on nonrebreather and got readings in 80s. Pt alert and oriented )       Subjective   Interval History:    No acute events overnight  Patient is seen on bedside.  Patient was getting PT OT and his oxygen saturation dropped significantly.  His oxygen was increased to 15 L which gradually came down to 6 L after rest.    denies any headache,  difficulty swallowing, cough, chest pain or tightness, fever, chill, night sweats, palpitation, ABD pain, nausea, vomiting, bowel and urinary habit changes, leg swelling,  and change in sleep and appetite.    Patient is vitally stable, still on 5L o2, 1500ml of documented urine output      HPI:   Mike Patel is a 83 y.o. 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    ROS:  As per HPI    ED Course:  Oriented x 3, tachypneic  Vitals: BP of 132/91, HR of 118, Temp of afebrile, SpO2 of 93% on 15 L of oxygen  Physical Exam: Tachycardic, tachypneic, no wheezing, some basilar crackles  EKG: Normal sinus rhythm without any ischemic changes  Labs: Hypokalemia of 5.3, BUN/creatinine normal range, lactic acidosis of 3.4 without anion gap, proBNP elevated to 734 with baseline of 400, trops 51, CBC WNL, COVID-positive, VBG showed mild hypoxic and hypercapnic respiratory failure with pH of 7.3 and pCO2 of 52.4 later improved to 32.4  Imaging: CXR shows residual opacities at the left lung bases, improved appearance of opacities of right lung base.  Treatment: Dexamethasone    Summary of Clinical Encounters:  12-17/12/2023: Hospital admission.  Presented with dizziness  opacities at left lung base favoring peribronchial thickening.   Etiologies would include reactive airway disease, bronchitis, viral syndrome.   2. Improved appearance of opacities at right lung base.             Medications:   furosemide  40 mg IntraVENous BID    levoFLOXacin  500 mg Oral Daily    sertraline  25 mg Oral Daily    insulin glargine  12 Units SubCUTAneous Nightly    insulin lispro  8 Units SubCUTAneous TID     baricitinib  4 mg Oral Daily    rivaroxaban  20 mg Oral Daily    sodium chloride flush  5-40 mL IntraVENous 2 times per day    insulin lispro  0-8 Units SubCUTAneous TID     insulin lispro  0-4 Units SubCUTAneous Nightly    dexAMETHasone  6 mg IntraVENous Q24H       sodium chloride      dextrose          Assessment & Plan     Acute hypoxic and hypercapnic respiratory failure  Likely secondary to recurrent COVID-pneumonia  Presented with hypoxia, rales on chest auscultation and lower extremity edema, hypercapnia on VBG  Initially required 15 L oxygen now down to 5 L with saturation of 93%  O2 req have gone up. Repeat CXR shows worsening interstitial fracture  Inc lasix to 40 mg IV BID  Continue oxygen as needed  Continue DuoNebs  Ordered respiratory culture, Legionella, and repeat blood cultures      Recurrent vs prolonged COVID-pneumonia  Resident of skilled nursing facility, presented with hypoxia, COVID-positive on lab test.  S/p dose of dexamethasone in ED  No leukocytosis  Continue dexamethasone 6 mg daily for 10 days  Started on Baricitinib  Continue oxygen as needed  Start levofloxacin    Acute exacerbation of diastolic heart failure  Hx of heart failure presented with shortness of breath, hypoxia, bilateral crackles on chest auscultation and lower extremity edema labs showed proBNP elevated  Urine output: 1500ml  Inc Lasix 40 mg IV twice daily  BMP daily  Strict I's and O's  Keep magnesium> 2 and> 4    Delirium  Depression  Ddx: Mood disorder  Likely secondary to the Infection and

## 2024-01-11 NOTE — PROGRESS NOTES
Physical Therapy  Facility/Department: [address]  Physical Therapy Initial Assessment/Treatment    Name: Mike Patel  : 1940  MRN: [id number]  Date of Service: 2024    Discharge Recommendations:  Subacute/Skilled Nursing Facility, 24 hour supervision or assist   PT Equipment Recommendations  Equipment Needed: Yes  Mobility Devices: Wheelchair  Wheelchair: Standard      Patient Diagnosis(es): The primary encounter diagnosis was Pneumonia due to COVID-19 virus. A diagnosis of Acute on chronic hypoxic respiratory failure (HCC) was also pertinent to this visit.  Past Medical History:  has a past medical history of Diastolic CHF (HCC) and Non Hodgkin's lymphoma (HCC).  Past Surgical History:  has a past surgical history that includes lymph node biopsy and IR PORT PLACEMENT < 5 YEARS.    Assessment   Body Structures, Functions, Activity Limitations Requiring Skilled Therapeutic Intervention: Decreased functional mobility ;Decreased strength;Decreased safe awareness;Decreased cognition;Decreased endurance;Decreased balance;Decreased coordination  Assessment: Pt is 84 y/o male with diagnosis of COVID-19. Pt presents to hospital from SNF where he received assistance with mobility with use of RW and WC. Currently pt requires Mod Ax1 for transfers and CGA for bed mobility. Unable to get accurate O2 reading throught session and notified RN and RT. Pt was anxious with labored breathing throught session and has significant cognitive deficits limiting treatment. Pt requires physical therapy services to improve endurance, fatigue, balance, and mobility. PT recommends SNF for continued care. If pt returns home, recommend 24 hr physical assist, HHPT, and WC.  Therapy Prognosis: Good  Decision Making: Medium Complexity  Barriers to Learning: Cognitive deficits  Requires PT Follow-Up: Yes  Activity Tolerance  Activity Tolerance: Patient tolerated evaluation without incident;Patient limited by fatigue;Patient limited

## 2024-01-12 LAB
ALBUMIN SERPL-MCNC: 2.9 G/DL (ref 3.4–5)
ALBUMIN/GLOB SERPL: 1.1 {RATIO} (ref 1.1–2.2)
ALP SERPL-CCNC: 83 U/L (ref 40–129)
ALT SERPL-CCNC: 12 U/L (ref 10–40)
ANION GAP SERPL CALCULATED.3IONS-SCNC: 12 MMOL/L (ref 3–16)
AST SERPL-CCNC: 21 U/L (ref 15–37)
BASOPHILS # BLD: 0 K/UL (ref 0–0.2)
BASOPHILS NFR BLD: 0.3 %
BILIRUB SERPL-MCNC: 0.4 MG/DL (ref 0–1)
BUN SERPL-MCNC: 32 MG/DL (ref 7–20)
CALCIUM SERPL-MCNC: 9.1 MG/DL (ref 8.3–10.6)
CHLORIDE SERPL-SCNC: 93 MMOL/L (ref 99–110)
CO2 SERPL-SCNC: 28 MMOL/L (ref 21–32)
CREAT SERPL-MCNC: 1.2 MG/DL (ref 0.8–1.3)
DEPRECATED RDW RBC AUTO: 15.4 % (ref 12.4–15.4)
EOSINOPHIL # BLD: 0 K/UL (ref 0–0.6)
EOSINOPHIL NFR BLD: 0 %
GFR SERPLBLD CREATININE-BSD FMLA CKD-EPI: 60 ML/MIN/{1.73_M2}
GLUCOSE BLD-MCNC: 120 MG/DL (ref 70–99)
GLUCOSE BLD-MCNC: 180 MG/DL (ref 70–99)
GLUCOSE BLD-MCNC: 199 MG/DL (ref 70–99)
GLUCOSE BLD-MCNC: 234 MG/DL (ref 70–99)
GLUCOSE SERPL-MCNC: 193 MG/DL (ref 70–99)
HCT VFR BLD AUTO: 29.9 % (ref 40.5–52.5)
HGB BLD-MCNC: 10 G/DL (ref 13.5–17.5)
LYMPHOCYTES # BLD: 0.8 K/UL (ref 1–5.1)
LYMPHOCYTES NFR BLD: 11.5 %
MAGNESIUM SERPL-MCNC: 1.8 MG/DL (ref 1.8–2.4)
MCH RBC QN AUTO: 30.7 PG (ref 26–34)
MCHC RBC AUTO-ENTMCNC: 33.6 G/DL (ref 31–36)
MCV RBC AUTO: 91.5 FL (ref 80–100)
MONOCYTES # BLD: 0.3 K/UL (ref 0–1.3)
MONOCYTES NFR BLD: 4.4 %
NEUTROPHILS # BLD: 5.9 K/UL (ref 1.7–7.7)
NEUTROPHILS NFR BLD: 83.8 %
PERFORMED ON: ABNORMAL
PLATELET # BLD AUTO: 352 K/UL (ref 135–450)
PMV BLD AUTO: 8.2 FL (ref 5–10.5)
POTASSIUM SERPL-SCNC: 5.1 MMOL/L (ref 3.5–5.1)
PROT SERPL-MCNC: 5.5 G/DL (ref 6.4–8.2)
RBC # BLD AUTO: 3.27 M/UL (ref 4.2–5.9)
RBC MORPH BLD: NORMAL
SLIDE REVIEW: ABNORMAL
SODIUM SERPL-SCNC: 133 MMOL/L (ref 136–145)
WBC # BLD AUTO: 7 K/UL (ref 4–11)

## 2024-01-12 PROCEDURE — 94761 N-INVAS EAR/PLS OXIMETRY MLT: CPT

## 2024-01-12 PROCEDURE — 6360000002 HC RX W HCPCS

## 2024-01-12 PROCEDURE — 6370000000 HC RX 637 (ALT 250 FOR IP)

## 2024-01-12 PROCEDURE — 80053 COMPREHEN METABOLIC PANEL: CPT

## 2024-01-12 PROCEDURE — 36415 COLL VENOUS BLD VENIPUNCTURE: CPT

## 2024-01-12 PROCEDURE — 83735 ASSAY OF MAGNESIUM: CPT

## 2024-01-12 PROCEDURE — 2580000003 HC RX 258

## 2024-01-12 PROCEDURE — 2700000000 HC OXYGEN THERAPY PER DAY

## 2024-01-12 PROCEDURE — 85025 COMPLETE CBC W/AUTO DIFF WBC: CPT

## 2024-01-12 PROCEDURE — 2060000000 HC ICU INTERMEDIATE R&B

## 2024-01-12 RX ORDER — LEVOFLOXACIN 500 MG/1
250 TABLET, FILM COATED ORAL ONCE
Status: COMPLETED | OUTPATIENT
Start: 2024-01-12 | End: 2024-01-12

## 2024-01-12 RX ORDER — LEVOFLOXACIN 750 MG/1
750 TABLET, FILM COATED ORAL EVERY OTHER DAY
Status: DISCONTINUED | OUTPATIENT
Start: 2024-01-13 | End: 2024-01-14

## 2024-01-12 RX ORDER — FUROSEMIDE 40 MG/1
40 TABLET ORAL 2 TIMES DAILY
Status: DISCONTINUED | OUTPATIENT
Start: 2024-01-12 | End: 2024-01-13

## 2024-01-12 RX ADMIN — LEVOFLOXACIN 250 MG: 500 TABLET, FILM COATED ORAL at 10:49

## 2024-01-12 RX ADMIN — FUROSEMIDE 40 MG: 10 INJECTION, SOLUTION INTRAMUSCULAR; INTRAVENOUS at 09:39

## 2024-01-12 RX ADMIN — INSULIN LISPRO 8 UNITS: 100 INJECTION, SOLUTION INTRAVENOUS; SUBCUTANEOUS at 12:24

## 2024-01-12 RX ADMIN — INSULIN LISPRO 8 UNITS: 100 INJECTION, SOLUTION INTRAVENOUS; SUBCUTANEOUS at 09:40

## 2024-01-12 RX ADMIN — FUROSEMIDE 40 MG: 40 TABLET ORAL at 17:40

## 2024-01-12 RX ADMIN — INSULIN LISPRO 8 UNITS: 100 INJECTION, SOLUTION INTRAVENOUS; SUBCUTANEOUS at 17:40

## 2024-01-12 RX ADMIN — SODIUM CHLORIDE, PRESERVATIVE FREE 10 ML: 5 INJECTION INTRAVENOUS at 09:40

## 2024-01-12 RX ADMIN — SODIUM CHLORIDE, PRESERVATIVE FREE 10 ML: 5 INJECTION INTRAVENOUS at 20:31

## 2024-01-12 RX ADMIN — DEXAMETHASONE SODIUM PHOSPHATE 6 MG: 10 INJECTION INTRAMUSCULAR; INTRAVENOUS at 17:47

## 2024-01-12 RX ADMIN — SERTRALINE HYDROCHLORIDE 25 MG: 25 TABLET ORAL at 09:40

## 2024-01-12 RX ADMIN — RIVAROXABAN 20 MG: 20 TABLET, FILM COATED ORAL at 17:40

## 2024-01-12 RX ADMIN — BARICITINIB 4 MG: 2 TABLET, FILM COATED ORAL at 10:49

## 2024-01-12 RX ADMIN — INSULIN GLARGINE 12 UNITS: 100 INJECTION, SOLUTION SUBCUTANEOUS at 20:31

## 2024-01-12 ASSESSMENT — PAIN SCALES - GENERAL
PAINLEVEL_OUTOF10: 0

## 2024-01-12 ASSESSMENT — PAIN SCALES - WONG BAKER
WONGBAKER_NUMERICALRESPONSE: 0

## 2024-01-12 NOTE — CARE COORDINATION
CM continues to follow for DCP- plan for SNF vs LTC at [facility]. Luis Fernando with Public Benefits to see today to determine Medicaid eligibility.   Referrals as follows:    [facility]- declined (no SNF beds, memory care out-of-pocket ~$12,000/month, does not accept medicaid)    [facility] - declined (no beds)    [facility] - pending (would be out of pocket until Medicaid)    [facility] - accepted (pt must have POA/guardianship papers prior to admission)    [facility] - declined (out of network)    Thank you  Cat Mark RN, BSN, CM  PCU   [phone]

## 2024-01-12 NOTE — PROGRESS NOTES
ICU/Internal Medicine Note    Patient Name: Mike Patel   Admit Date: 1/9/2024   Code:Limited  PCP: Mika Orellana MD   Attending: Fernando Johnston MD    Chief Complaint: Respiratory Distress (Per nursing facility pts sats in 40s. Ems placed on nonrebreather and got readings in 80s. Pt alert and oriented )       Subjective   Interval History:    No acute events overnight  Patient is seen on bedside.  Patient was getting PT OT and his oxygen saturation dropped significantly.  His oxygen was increased to 15 L which gradually came down to 6 L after rest.    denies any headache,  difficulty swallowing, cough, chest pain or tightness, fever, chill, night sweats, palpitation, ABD pain, nausea, vomiting, bowel and urinary habit changes, leg swelling,  and change in sleep and appetite.    Patient is vitally stable, still on 7L o2, 2000ml of documented urine output      HPI:   Mike Patel is a 83 y.o. 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    ROS:  As per HPI    ED Course:  Oriented x 3, tachypneic  Vitals: BP of 132/91, HR of 118, Temp of afebrile, SpO2 of 93% on 15 L of oxygen  Physical Exam: Tachycardic, tachypneic, no wheezing, some basilar crackles  EKG: Normal sinus rhythm without any ischemic changes  Labs: Hypokalemia of 5.3, BUN/creatinine normal range, lactic acidosis of 3.4 without anion gap, proBNP elevated to 734 with baseline of 400, trops 51, CBC WNL, COVID-positive, VBG showed mild hypoxic and hypercapnic respiratory failure with pH of 7.3 and pCO2 of 52.4 later improved to 32.4  Imaging: CXR shows residual opacities at the left lung bases, improved appearance of opacities of right lung base.  Treatment: Dexamethasone    Summary of Clinical Encounters:  12-17/12/2023: Hospital admission.  Presented with dizziness  injection vial Inject 13 Units into the skin nightly 12/15/23   Micah Garg MD   spironolactone (ALDACTONE) 50 MG tablet Take 1 tablet by mouth daily 12/11/23   Philip Eldridge MD   guaiFENesin (MUCINEX) 600 MG extended release tablet Take 1 tablet by mouth 2 times daily 11/19/23   Mark Childs, APRN - CNP   rivaroxaban (XARELTO) 20 MG TABS tablet Take 1 tablet by mouth Daily with supper    Joe Alvarez MD   nystatin (MYCOSTATIN) 277836 UNIT/GM powder Apply 2 times daily. 11/7/21   Jessy Acosta MD   Cholecalciferol (VITAMIN D3) 125 MCG (5000 UT) TABS Take by mouth daily    Joe Alvarez MD   Multiple Vitamins-Minerals (THERAPEUTIC MULTIVITAMIN-MINERALS) tablet Take 1 tablet by mouth daily    Joe Alvarez MD   vitamin C (ASCORBIC ACID) 500 MG tablet Take 1 tablet by mouth daily    Joe Alvarez MD       Allergies:  Allergies   Allergen Reactions    Glimepiride Hives    Metformin Hives    Sitagliptin     Acetaminophen Other (See Comments)     numbness    Diphenhydramine Other (See Comments)     Benadryl with tylenol   Numbness    Statins Other (See Comments)     Other reaction(s): Weakness  Paralyzes body         Social Hx:  Social History     Socioeconomic History    Marital status:      Spouse name: None    Number of children: None    Years of education: None    Highest education level: None   Tobacco Use    Smoking status: Never    Smokeless tobacco: Never   Substance and Sexual Activity    Alcohol use: Never    Drug use: Never     Social Determinants of Health     Food Insecurity: No Food Insecurity (1/11/2024)    Hunger Vital Sign     Worried About Running Out of Food in the Last Year: Never true     Ran Out of Food in the Last Year: Never true   Transportation Needs: No Transportation Needs (1/11/2024)    PRAPARE - Transportation     Lack of Transportation (Medical): No     Lack of Transportation (Non-Medical): No   Housing Stability: Low Risk

## 2024-01-12 NOTE — PLAN OF CARE
Problem: Discharge Planning  Goal: Discharge to home or other facility with appropriate resources  1/12/2024 1516 by Sonam Silva, RN  Outcome: Progressing  Flowsheets (Taken 1/12/2024 0239 by Bre Davalos, RN)  Discharge to home or other facility with appropriate resources:   Identify barriers to discharge with patient and caregiver   Arrange for needed discharge resources and transportation as appropriate   Identify discharge learning needs (meds, wound care, etc)   Refer to discharge planning if patient needs post-hospital services based on physician order or complex needs related to functional status, cognitive ability or social support system     Problem: Respiratory - Adult  Goal: Achieves optimal ventilation and oxygenation  1/12/2024 1516 by Sonam Silva, RN  Outcome: Progressing  Flowsheets (Taken 1/12/2024 0239 by Bre Davalos, RN)  Achieves optimal ventilation and oxygenation:   Assess for changes in respiratory status   Assess for changes in mentation and behavior   Position to facilitate oxygenation and minimize respiratory effort   Oxygen supplementation based on oxygen saturation or arterial blood gases   Encourage broncho-pulmonary hygiene including cough, deep breathe, incentive spirometry   Assess the need for suctioning and aspirate as needed   Assess and instruct to report shortness of breath or any respiratory difficulty   Respiratory therapy support as indicated     Problem: Metabolic/Fluid and Electrolytes - Adult  Goal: Electrolytes maintained within normal limits  Outcome: Progressing  Flowsheets (Taken 1/12/2024 1516)  Electrolytes maintained within normal limits:   Monitor labs and assess patient for signs and symptoms of electrolyte imbalances   Monitor response to electrolyte replacements, including repeat lab results as appropriate   Administer electrolyte replacement as ordered     Problem: Safety - Adult  Goal: Free from fall injury  Outcome:

## 2024-01-12 NOTE — CARE COORDINATION
Verbal at bedside for IMM with patient and wife. Wife signed the IMM form. Patient was unable to sign form.

## 2024-01-12 NOTE — PLAN OF CARE
Problem: Discharge Planning  Goal: Discharge to home or other facility with appropriate resources  Outcome: Progressing  Flowsheets (Taken 1/12/2024 0239)  Discharge to home or other facility with appropriate resources:   Identify barriers to discharge with patient and caregiver   Arrange for needed discharge resources and transportation as appropriate   Identify discharge learning needs (meds, wound care, etc)   Refer to discharge planning if patient needs post-hospital services based on physician order or complex needs related to functional status, cognitive ability or social support system     Problem: Respiratory - Adult  Goal: Achieves optimal ventilation and oxygenation  1/12/2024 0239 by Bre Davalos, RN  Outcome: Progressing  Flowsheets (Taken 1/12/2024 0239)  Achieves optimal ventilation and oxygenation:   Assess for changes in respiratory status   Assess for changes in mentation and behavior   Position to facilitate oxygenation and minimize respiratory effort   Oxygen supplementation based on oxygen saturation or arterial blood gases   Encourage broncho-pulmonary hygiene including cough, deep breathe, incentive spirometry   Assess the need for suctioning and aspirate as needed   Assess and instruct to report shortness of breath or any respiratory difficulty   Respiratory therapy support as indicated     Problem: Cardiovascular - Adult  Goal: Maintains optimal cardiac output and hemodynamic stability  Outcome: Progressing  Flowsheets (Taken 1/12/2024 0239)  Maintains optimal cardiac output and hemodynamic stability:   Monitor blood pressure and heart rate   Monitor urine output and notify Licensed Independent Practitioner for values outside of normal range   Assess for signs of decreased cardiac output

## 2024-01-12 NOTE — PROGRESS NOTES
Pharmacy Note - Renal Dosing    Levofloxacin ordered for treatment of CAP. Per [facility] Renal Dose Adjustment Policy, Levofloxacin will be changed to 750 mg po q48h. Pt had 500mg dose last night - will give additional 250mg po x1 today.      Estimated Creatinine Clearance: Estimated Creatinine Clearance: 49 mL/min (based on SCr of 1.2 mg/dL).  Dialysis Status, FAWAD, CKD:   BMI: Body mass index is 24.6 kg/m².    Rationale for Adjustment: Agent is renally eliminated    Pharmacy will continue to monitor renal function and adjust dose as necessary.      Please call with questions--  Raven Lam, PharmD, BCPS  Wireless: [phone]   1/12/2024 9:50 AM

## 2024-01-13 LAB
ALBUMIN SERPL-MCNC: 2.7 G/DL (ref 3.4–5)
ALBUMIN/GLOB SERPL: 0.9 {RATIO} (ref 1.1–2.2)
ALP SERPL-CCNC: 85 U/L (ref 40–129)
ALT SERPL-CCNC: 10 U/L (ref 10–40)
ANION GAP SERPL CALCULATED.3IONS-SCNC: 14 MMOL/L (ref 3–16)
AST SERPL-CCNC: 21 U/L (ref 15–37)
BASOPHILS # BLD: 0 K/UL (ref 0–0.2)
BASOPHILS NFR BLD: 0 %
BILIRUB SERPL-MCNC: 0.5 MG/DL (ref 0–1)
BUN SERPL-MCNC: 44 MG/DL (ref 7–20)
CALCIUM SERPL-MCNC: 9.2 MG/DL (ref 8.3–10.6)
CHLORIDE SERPL-SCNC: 92 MMOL/L (ref 99–110)
CO2 SERPL-SCNC: 25 MMOL/L (ref 21–32)
CREAT SERPL-MCNC: 1.5 MG/DL (ref 0.8–1.3)
DEPRECATED RDW RBC AUTO: 14.8 % (ref 12.4–15.4)
EOSINOPHIL # BLD: 0 K/UL (ref 0–0.6)
EOSINOPHIL NFR BLD: 0 %
GFR SERPLBLD CREATININE-BSD FMLA CKD-EPI: 46 ML/MIN/{1.73_M2}
GLUCOSE BLD-MCNC: 134 MG/DL (ref 70–99)
GLUCOSE BLD-MCNC: 149 MG/DL (ref 70–99)
GLUCOSE BLD-MCNC: 154 MG/DL (ref 70–99)
GLUCOSE BLD-MCNC: 162 MG/DL (ref 70–99)
GLUCOSE SERPL-MCNC: 169 MG/DL (ref 70–99)
HCT VFR BLD AUTO: 30.2 % (ref 40.5–52.5)
HGB BLD-MCNC: 10.5 G/DL (ref 13.5–17.5)
LYMPHOCYTES # BLD: 1.3 K/UL (ref 1–5.1)
LYMPHOCYTES NFR BLD: 16 %
MAGNESIUM SERPL-MCNC: 1.9 MG/DL (ref 1.8–2.4)
MCH RBC QN AUTO: 31.8 PG (ref 26–34)
MCHC RBC AUTO-ENTMCNC: 34.7 G/DL (ref 31–36)
MCV RBC AUTO: 91.5 FL (ref 80–100)
MONOCYTES # BLD: 0.3 K/UL (ref 0–1.3)
MONOCYTES NFR BLD: 4 %
NEUTROPHILS # BLD: 6.5 K/UL (ref 1.7–7.7)
NEUTROPHILS NFR BLD: 80 %
OVALOCYTES BLD QL SMEAR: ABNORMAL
PERFORMED ON: ABNORMAL
PLATELET # BLD AUTO: 331 K/UL (ref 135–450)
PLATELET BLD QL SMEAR: ADEQUATE
PMV BLD AUTO: 7.9 FL (ref 5–10.5)
POLYCHROMASIA BLD QL SMEAR: ABNORMAL
POTASSIUM SERPL-SCNC: 4.7 MMOL/L (ref 3.5–5.1)
PROT SERPL-MCNC: 5.8 G/DL (ref 6.4–8.2)
RBC # BLD AUTO: 3.3 M/UL (ref 4.2–5.9)
SODIUM SERPL-SCNC: 131 MMOL/L (ref 136–145)
WBC # BLD AUTO: 8.1 K/UL (ref 4–11)

## 2024-01-13 PROCEDURE — 83735 ASSAY OF MAGNESIUM: CPT

## 2024-01-13 PROCEDURE — 2700000000 HC OXYGEN THERAPY PER DAY

## 2024-01-13 PROCEDURE — 80053 COMPREHEN METABOLIC PANEL: CPT

## 2024-01-13 PROCEDURE — 87449 NOS EACH ORGANISM AG IA: CPT

## 2024-01-13 PROCEDURE — 2580000003 HC RX 258: Performed by: INTERNAL MEDICINE

## 2024-01-13 PROCEDURE — 2580000003 HC RX 258

## 2024-01-13 PROCEDURE — 6360000002 HC RX W HCPCS: Performed by: INTERNAL MEDICINE

## 2024-01-13 PROCEDURE — 6370000000 HC RX 637 (ALT 250 FOR IP)

## 2024-01-13 PROCEDURE — 2060000000 HC ICU INTERMEDIATE R&B

## 2024-01-13 PROCEDURE — 94761 N-INVAS EAR/PLS OXIMETRY MLT: CPT

## 2024-01-13 PROCEDURE — 36415 COLL VENOUS BLD VENIPUNCTURE: CPT

## 2024-01-13 PROCEDURE — 85025 COMPLETE CBC W/AUTO DIFF WBC: CPT

## 2024-01-13 PROCEDURE — 6370000000 HC RX 637 (ALT 250 FOR IP): Performed by: INTERNAL MEDICINE

## 2024-01-13 RX ORDER — SODIUM CHLORIDE 9 MG/ML
INJECTION, SOLUTION INTRAVENOUS CONTINUOUS
Status: ACTIVE | OUTPATIENT
Start: 2024-01-13 | End: 2024-01-13

## 2024-01-13 RX ORDER — DEXAMETHASONE 4 MG/1
6 TABLET ORAL EVERY 24 HOURS
Status: DISCONTINUED | OUTPATIENT
Start: 2024-01-13 | End: 2024-01-14

## 2024-01-13 RX ORDER — INSULIN LISPRO 100 [IU]/ML
3 INJECTION, SOLUTION INTRAVENOUS; SUBCUTANEOUS
Status: DISCONTINUED | OUTPATIENT
Start: 2024-01-13 | End: 2024-01-19 | Stop reason: HOSPADM

## 2024-01-13 RX ADMIN — LEVOFLOXACIN 750 MG: 750 TABLET, FILM COATED ORAL at 09:27

## 2024-01-13 RX ADMIN — INSULIN LISPRO 3 UNITS: 100 INJECTION, SOLUTION INTRAVENOUS; SUBCUTANEOUS at 12:56

## 2024-01-13 RX ADMIN — SERTRALINE HYDROCHLORIDE 25 MG: 25 TABLET ORAL at 09:27

## 2024-01-13 RX ADMIN — DEXAMETHASONE 6 MG: 4 TABLET ORAL at 17:38

## 2024-01-13 RX ADMIN — SODIUM CHLORIDE, PRESERVATIVE FREE 10 ML: 5 INJECTION INTRAVENOUS at 09:27

## 2024-01-13 RX ADMIN — INSULIN LISPRO 3 UNITS: 100 INJECTION, SOLUTION INTRAVENOUS; SUBCUTANEOUS at 17:46

## 2024-01-13 RX ADMIN — BARICITINIB 4 MG: 2 TABLET, FILM COATED ORAL at 09:27

## 2024-01-13 RX ADMIN — INSULIN GLARGINE 12 UNITS: 100 INJECTION, SOLUTION SUBCUTANEOUS at 22:35

## 2024-01-13 RX ADMIN — SODIUM CHLORIDE: 9 INJECTION, SOLUTION INTRAVENOUS at 11:00

## 2024-01-13 RX ADMIN — RIVAROXABAN 20 MG: 20 TABLET, FILM COATED ORAL at 17:38

## 2024-01-13 ASSESSMENT — PAIN SCALES - WONG BAKER
WONGBAKER_NUMERICALRESPONSE: 0

## 2024-01-13 ASSESSMENT — PAIN SCALES - PAIN ASSESSMENT IN ADVANCED DEMENTIA (PAINAD)
NEGVOCALIZATION: 0
FACIALEXPRESSION: 0
TOTALSCORE: 0
BODYLANGUAGE: 0
BREATHING: 0
CONSOLABILITY: 0

## 2024-01-13 ASSESSMENT — PAIN SCALES - GENERAL
PAINLEVEL_OUTOF10: 0

## 2024-01-13 NOTE — PROGRESS NOTES
V2.0    [facility] Progress Note      Name:  Mike Patel /Age/Sex: 1940  (83 y.o. male)   MRN & CSN:  [id number] & [id number] Encounter Date/Time: 2024 1:02 PM EST   Location:  [address] [place] [address] PCP: Mika Orellana MD     Attending:Fernando Johnston MD       Hospital Day: 5    Assessment and Recommendations     Active Hospital Problems    Diagnosis Date Noted    COVID-19 [U07.1] 2024     Acute hypoxic and hypercapnic respiratory failure - possible due to recurrent COVID-pneumonia with superimposed bacterial pneumonia with gram +ve/Atypical organisms  Presented with hypoxia, rales on chest auscultation and lower extremity edema, hypercapnia on VBG  Initially required 15 L oxygen ---> down to 5 L  -- now 3-4 L   STILL DESATS on minimal exertion  Echo with EF 55% and indeterminate diastolic function, no significant valvular abnormalities   -- Repeat CXR shows worsening interstitial fracture  Will repeat XR for further assessment  Rise in Cr and borderline blood pressure so will hold lasix now  Continue DuoNebs  Follow up respiratory culture/PNA panel, Urine legionella/Strep   Will get CTPA for further assessment on  once Cr improves     Hypotension - suspected due to diuresis, stop Lasix, start IVF x 10 hrs    Recurrent vs prolonged COVID-pneumonia  Resident of skilled nursing facility, presented with hypoxia, COVID-positive on lab test. S/p dose of dexamethasone in ED. No leukocytosis  Continue dexamethasone 6 mg daily for 10 days  Continue on Baricitinib, likely dc 1-2 days  Continue oxygen as needed  Continue levofloxacin     Possible Acute exacerbation of diastolic heart failure  Hx of heart failure presented with shortness of breath, hypoxia, bilateral crackles on chest auscultation and lower extremity edema labs showed proBNP elevated  Appears euvolemic now  Cr rise to 1.5  Hold diuretics  RFTs daily     Delirium  Depression  Ddx: Mood disorder  Likely secondary to the  Date: 1/9/2024  INDICATION: hypoxia Portable chest 1/9/2024 8:49 AM: Comparison study is dated 12/12/2023. Implanted venous port is unchanged in position. Cardiomediastinal silhouette appears normal in size and configuration. Pulmonary vascularity appears within normal limits. Subtle opacities are visualized at the left lung base that may represent regional peribronchial thickening. Previously noted right basilar opacities have improved. No evidence of pleural effusion. Bony thorax appears unchanged.     1. Residual opacities at left lung base favoring peribronchial thickening. Etiologies would include reactive airway disease, bronchitis, viral syndrome. 2. Improved appearance of opacities at right lung base.       CBC:   Recent Labs     01/11/24  0614 01/12/24  0544 01/13/24  0538   WBC 6.9 7.0 8.1   HGB 10.1* 10.0* 10.5*    352 331     BMP:    Recent Labs     01/11/24  0614 01/12/24  0544 01/13/24  0538   * 133* 131*   K 4.6 5.1 4.7   CL 96* 93* 92*   CO2 26 28 25   BUN 27* 32* 44*   CREATININE 1.1 1.2 1.5*   GLUCOSE 219* 193* 169*     Hepatic:   Recent Labs     01/11/24  0614 01/12/24  0544 01/13/24  0538   AST 21 21 21   ALT 12 12 10   BILITOT 0.3 0.4 0.5   ALKPHOS 86 83 85     Lipids:   Lab Results   Component Value Date/Time    CHOL 177 12/04/2023 05:51 AM    HDL 27 12/04/2023 05:51 AM    TRIG 112 12/04/2023 05:51 AM     Hemoglobin A1C:   Lab Results   Component Value Date/Time    LABA1C 9.1 12/13/2023 02:07 AM     TSH:   Lab Results   Component Value Date/Time    TSH 0.68 12/03/2023 06:27 AM     Troponin: No results found for: \"TROPONINT\"  Lactic Acid:   Recent Labs     01/11/24 0614   LACTA 1.2     BNP: No results for input(s): \"PROBNP\" in the last 72 hours.  UA:  Lab Results   Component Value Date/Time    NITRU Negative 12/13/2023 02:07 AM    COLORU Yellow 12/13/2023 02:07 AM    PHUR 5.5 12/13/2023 02:07 AM    WBCUA 0-2 12/13/2023 02:07 AM    RBCUA  12/13/2023 02:07 AM    MUCUS Rare

## 2024-01-13 NOTE — PROGRESS NOTES
Pharmacy Note - Renal Dosing    Pt is on Baricitinib for treatment of COVID-19. Per [facility] Renal Dose Adjustment Policy, Baricitiniib dose will be changed to 2 mg po daily as est GFR is now < 60 mL/min.     Estimated GFR:  46 mL/min  BMI: Body mass index is 25.27 kg/m²..    Pharmacy will continue to monitor renal function and adjust dose as necessary.      Please call with questions--  Thanks--  Jenny Ramon, HaoD, BCPS, BCGP  [phone] ([facility])   1/13/2024 2:41 PM

## 2024-01-13 NOTE — PLAN OF CARE
Problem: Discharge Planning  Goal: Discharge to home or other facility with appropriate resources  Outcome: Progressing  Flowsheets (Taken 1/13/2024 1835)  Discharge to home or other facility with appropriate resources:   Identify barriers to discharge with patient and caregiver   Arrange for needed discharge resources and transportation as appropriate     Problem: Respiratory - Adult  Goal: Achieves optimal ventilation and oxygenation  Outcome: Progressing  Flowsheets (Taken 1/13/2024 1835)  Achieves optimal ventilation and oxygenation:   Assess for changes in respiratory status   Assess for changes in mentation and behavior     Problem: Cardiovascular - Adult  Goal: Maintains optimal cardiac output and hemodynamic stability  Outcome: Progressing  Flowsheets (Taken 1/13/2024 1835)  Maintains optimal cardiac output and hemodynamic stability:   Monitor blood pressure and heart rate   Monitor urine output and notify Licensed Independent Practitioner for values outside of normal range   Assess for signs of decreased cardiac output  Goal: Absence of cardiac dysrhythmias or at baseline  Outcome: Progressing  Flowsheets (Taken 1/13/2024 1835)  Absence of cardiac dysrhythmias or at baseline:   Monitor cardiac rate and rhythm   Assess for signs of decreased cardiac output     Problem: Metabolic/Fluid and Electrolytes - Adult  Goal: Electrolytes maintained within normal limits  Outcome: Progressing  Flowsheets (Taken 1/13/2024 1835)  Electrolytes maintained within normal limits:   Monitor labs and assess patient for signs and symptoms of electrolyte imbalances   Administer electrolyte replacement as ordered   Monitor response to electrolyte replacements, including repeat lab results as appropriate  Goal: Hemodynamic stability and optimal renal function maintained  Outcome: Progressing  Flowsheets (Taken 1/13/2024 1835)  Hemodynamic stability and optimal renal function maintained: Monitor labs and assess for signs and

## 2024-01-13 NOTE — PLAN OF CARE
Problem: Discharge Planning  Goal: Discharge to home or other facility with appropriate resources  1/13/2024 0030 by Bre Davalos RN  Outcome: Progressing  Flowsheets (Taken 1/13/2024 0030)  Discharge to home or other facility with appropriate resources:   Identify barriers to discharge with patient and caregiver   Identify discharge learning needs (meds, wound care, etc)   Refer to discharge planning if patient needs post-hospital services based on physician order or complex needs related to functional status, cognitive ability or social support system   Arrange for interpreters to assist at discharge as needed     Problem: Respiratory - Adult  Goal: Achieves optimal ventilation and oxygenation  1/13/2024 0030 by Bre Davalos RN  Outcome: Progressing  Flowsheets (Taken 1/13/2024 0030)  Achieves optimal ventilation and oxygenation:   Assess for changes in respiratory status   Position to facilitate oxygenation and minimize respiratory effort   Assess the need for suctioning and aspirate as needed   Respiratory therapy support as indicated   Assess for changes in mentation and behavior   Oxygen supplementation based on oxygen saturation or arterial blood gases   Encourage broncho-pulmonary hygiene including cough, deep breathe, incentive spirometry   Assess and instruct to report shortness of breath or any respiratory difficulty     Problem: Cardiovascular - Adult  Goal: Maintains optimal cardiac output and hemodynamic stability  Outcome: Progressing  Flowsheets (Taken 1/13/2024 0030)  Maintains optimal cardiac output and hemodynamic stability:   Monitor blood pressure and heart rate   Assess for signs of decreased cardiac output     Problem: Safety - Adult  Goal: Free from fall injury  1/13/2024 0030 by Bre Davalos RN  Outcome: Progressing  Flowsheets (Taken 1/13/2024 0030)  Free From Fall Injury: Instruct family/caregiver on patient safety  Note: Fall protocol in

## 2024-01-14 ENCOUNTER — APPOINTMENT (OUTPATIENT)
Dept: CT IMAGING | Age: 84
End: 2024-01-14
Payer: MEDICARE

## 2024-01-14 LAB
ALBUMIN SERPL-MCNC: 2.8 G/DL (ref 3.4–5)
ALBUMIN/GLOB SERPL: 1 {RATIO} (ref 1.1–2.2)
ALP SERPL-CCNC: 79 U/L (ref 40–129)
ALT SERPL-CCNC: 10 U/L (ref 10–40)
ANION GAP SERPL CALCULATED.3IONS-SCNC: 12 MMOL/L (ref 3–16)
AST SERPL-CCNC: 25 U/L (ref 15–37)
BASOPHILS # BLD: 0 K/UL (ref 0–0.2)
BASOPHILS NFR BLD: 0 %
BILIRUB SERPL-MCNC: 0.3 MG/DL (ref 0–1)
BUN SERPL-MCNC: 40 MG/DL (ref 7–20)
CALCIUM SERPL-MCNC: 8.7 MG/DL (ref 8.3–10.6)
CHLORIDE SERPL-SCNC: 95 MMOL/L (ref 99–110)
CO2 SERPL-SCNC: 25 MMOL/L (ref 21–32)
CREAT SERPL-MCNC: 1.1 MG/DL (ref 0.8–1.3)
DEPRECATED RDW RBC AUTO: 15.2 % (ref 12.4–15.4)
EOSINOPHIL # BLD: 0 K/UL (ref 0–0.6)
EOSINOPHIL NFR BLD: 0 %
GFR SERPLBLD CREATININE-BSD FMLA CKD-EPI: >60 ML/MIN/{1.73_M2}
GLUCOSE BLD-MCNC: 110 MG/DL (ref 70–99)
GLUCOSE BLD-MCNC: 153 MG/DL (ref 70–99)
GLUCOSE BLD-MCNC: 174 MG/DL (ref 70–99)
GLUCOSE BLD-MCNC: 235 MG/DL (ref 70–99)
GLUCOSE SERPL-MCNC: 247 MG/DL (ref 70–99)
HCT VFR BLD AUTO: 30 % (ref 40.5–52.5)
HGB BLD-MCNC: 9.9 G/DL (ref 13.5–17.5)
LACTATE BLDV-SCNC: 1.1 MMOL/L (ref 0.4–2)
LEGIONELLA AG UR QL: NORMAL
LYMPHOCYTES # BLD: 0.6 K/UL (ref 1–5.1)
LYMPHOCYTES NFR BLD: 8 %
MAGNESIUM SERPL-MCNC: 2 MG/DL (ref 1.8–2.4)
MCH RBC QN AUTO: 30.6 PG (ref 26–34)
MCHC RBC AUTO-ENTMCNC: 33.1 G/DL (ref 31–36)
MCV RBC AUTO: 92.3 FL (ref 80–100)
MONOCYTES # BLD: 0.1 K/UL (ref 0–1.3)
MONOCYTES NFR BLD: 2 %
NEUTROPHILS # BLD: 6.3 K/UL (ref 1.7–7.7)
NEUTROPHILS NFR BLD: 90 %
OVALOCYTES BLD QL SMEAR: ABNORMAL
PERFORMED ON: ABNORMAL
PLATELET # BLD AUTO: 311 K/UL (ref 135–450)
PMV BLD AUTO: 7.6 FL (ref 5–10.5)
POTASSIUM SERPL-SCNC: 4.2 MMOL/L (ref 3.5–5.1)
PROT SERPL-MCNC: 5.5 G/DL (ref 6.4–8.2)
RBC # BLD AUTO: 3.25 M/UL (ref 4.2–5.9)
S PNEUM AG UR QL: NORMAL
SODIUM SERPL-SCNC: 132 MMOL/L (ref 136–145)
WBC # BLD AUTO: 7 K/UL (ref 4–11)

## 2024-01-14 PROCEDURE — 6370000000 HC RX 637 (ALT 250 FOR IP)

## 2024-01-14 PROCEDURE — 6360000002 HC RX W HCPCS: Performed by: INTERNAL MEDICINE

## 2024-01-14 PROCEDURE — 2060000000 HC ICU INTERMEDIATE R&B

## 2024-01-14 PROCEDURE — 71260 CT THORAX DX C+: CPT

## 2024-01-14 PROCEDURE — 6360000004 HC RX CONTRAST MEDICATION: Performed by: INTERNAL MEDICINE

## 2024-01-14 PROCEDURE — 74177 CT ABD & PELVIS W/CONTRAST: CPT

## 2024-01-14 PROCEDURE — 85025 COMPLETE CBC W/AUTO DIFF WBC: CPT

## 2024-01-14 PROCEDURE — 2580000003 HC RX 258: Performed by: INTERNAL MEDICINE

## 2024-01-14 PROCEDURE — 80053 COMPREHEN METABOLIC PANEL: CPT

## 2024-01-14 PROCEDURE — 36415 COLL VENOUS BLD VENIPUNCTURE: CPT

## 2024-01-14 PROCEDURE — 99223 1ST HOSP IP/OBS HIGH 75: CPT | Performed by: INTERNAL MEDICINE

## 2024-01-14 PROCEDURE — 83735 ASSAY OF MAGNESIUM: CPT

## 2024-01-14 PROCEDURE — 2580000003 HC RX 258

## 2024-01-14 PROCEDURE — 6370000000 HC RX 637 (ALT 250 FOR IP): Performed by: INTERNAL MEDICINE

## 2024-01-14 PROCEDURE — 83605 ASSAY OF LACTIC ACID: CPT

## 2024-01-14 RX ORDER — 0.9 % SODIUM CHLORIDE 0.9 %
500 INTRAVENOUS SOLUTION INTRAVENOUS ONCE
Status: COMPLETED | OUTPATIENT
Start: 2024-01-14 | End: 2024-01-14

## 2024-01-14 RX ORDER — LEVOFLOXACIN 750 MG/1
750 TABLET, FILM COATED ORAL DAILY
Status: COMPLETED | OUTPATIENT
Start: 2024-01-14 | End: 2024-01-15

## 2024-01-14 RX ORDER — SODIUM CHLORIDE, SODIUM LACTATE, POTASSIUM CHLORIDE, CALCIUM CHLORIDE 600; 310; 30; 20 MG/100ML; MG/100ML; MG/100ML; MG/100ML
INJECTION, SOLUTION INTRAVENOUS CONTINUOUS
Status: ACTIVE | OUTPATIENT
Start: 2024-01-14 | End: 2024-01-14

## 2024-01-14 RX ORDER — FUROSEMIDE 10 MG/ML
20 INJECTION INTRAMUSCULAR; INTRAVENOUS DAILY
Status: DISCONTINUED | OUTPATIENT
Start: 2024-01-15 | End: 2024-01-17

## 2024-01-14 RX ADMIN — DEXAMETHASONE 6 MG: 4 TABLET ORAL at 17:41

## 2024-01-14 RX ADMIN — LEVOFLOXACIN 750 MG: 750 TABLET, FILM COATED ORAL at 14:02

## 2024-01-14 RX ADMIN — SODIUM CHLORIDE, PRESERVATIVE FREE 10 ML: 5 INJECTION INTRAVENOUS at 09:15

## 2024-01-14 RX ADMIN — SODIUM CHLORIDE 500 ML: 9 INJECTION, SOLUTION INTRAVENOUS at 05:38

## 2024-01-14 RX ADMIN — INSULIN GLARGINE 12 UNITS: 100 INJECTION, SOLUTION SUBCUTANEOUS at 22:23

## 2024-01-14 RX ADMIN — SERTRALINE HYDROCHLORIDE 25 MG: 25 TABLET ORAL at 09:14

## 2024-01-14 RX ADMIN — SODIUM CHLORIDE, PRESERVATIVE FREE 10 ML: 5 INJECTION INTRAVENOUS at 22:13

## 2024-01-14 RX ADMIN — RIVAROXABAN 20 MG: 20 TABLET, FILM COATED ORAL at 17:41

## 2024-01-14 RX ADMIN — INSULIN LISPRO 3 UNITS: 100 INJECTION, SOLUTION INTRAVENOUS; SUBCUTANEOUS at 09:15

## 2024-01-14 RX ADMIN — BARICITINIB 2 MG: 2 TABLET, FILM COATED ORAL at 09:14

## 2024-01-14 RX ADMIN — INSULIN LISPRO 3 UNITS: 100 INJECTION, SOLUTION INTRAVENOUS; SUBCUTANEOUS at 12:29

## 2024-01-14 RX ADMIN — SODIUM CHLORIDE, POTASSIUM CHLORIDE, SODIUM LACTATE AND CALCIUM CHLORIDE: 600; 310; 30; 20 INJECTION, SOLUTION INTRAVENOUS at 09:25

## 2024-01-14 RX ADMIN — IOPAMIDOL 75 ML: 755 INJECTION, SOLUTION INTRAVENOUS at 08:27

## 2024-01-14 RX ADMIN — INSULIN LISPRO 2 UNITS: 100 INJECTION, SOLUTION INTRAVENOUS; SUBCUTANEOUS at 09:14

## 2024-01-14 ASSESSMENT — PAIN SCALES - GENERAL
PAINLEVEL_OUTOF10: 0
PAINLEVEL_OUTOF10: 0

## 2024-01-14 NOTE — PROGRESS NOTES
Pharmacy Note - Renal Dosing    Pt is on Baricitinib for treatment of COVID-19. Per [facility] Renal Dose Adjustment Policy, Baricitiniib dose will be changed to 4 mg po daily as est GFR is now > 60 mL/min.       Estimated GFR:  >60 mL/min  Estimated Creatinine Clearance: 53 mL/min (based on SCr of 1.1 mg/dL).  BMI: Body mass index is 26.07 kg/m²..    Pharmacy will continue to monitor renal function and adjust dose as necessary.      Please call with questions--  Thanks--  Jenny Ramon, PharmD, BCPS, BCGP  [phone] ([facility])   1/14/2024 2:19 PM

## 2024-01-14 NOTE — PLAN OF CARE
Problem: Discharge Planning  Goal: Discharge to home or other facility with appropriate resources  Outcome: Progressing  Flowsheets (Taken 1/14/2024 1456)  Discharge to home or other facility with appropriate resources:   Identify barriers to discharge with patient and caregiver   Arrange for needed discharge resources and transportation as appropriate   Identify discharge learning needs (meds, wound care, etc)     Problem: Respiratory - Adult  Goal: Achieves optimal ventilation and oxygenation  Outcome: Progressing  Flowsheets (Taken 1/14/2024 1456)  Achieves optimal ventilation and oxygenation:   Assess for changes in respiratory status   Assess for changes in mentation and behavior   Position to facilitate oxygenation and minimize respiratory effort     Problem: Cardiovascular - Adult  Goal: Maintains optimal cardiac output and hemodynamic stability  Outcome: Progressing  Flowsheets (Taken 1/14/2024 1456)  Maintains optimal cardiac output and hemodynamic stability:   Monitor blood pressure and heart rate   Monitor urine output and notify Licensed Independent Practitioner for values outside of normal range  Goal: Absence of cardiac dysrhythmias or at baseline  Outcome: Progressing  Flowsheets (Taken 1/14/2024 1456)  Absence of cardiac dysrhythmias or at baseline: Monitor cardiac rate and rhythm     Problem: Metabolic/Fluid and Electrolytes - Adult  Goal: Electrolytes maintained within normal limits  Outcome: Progressing  Flowsheets (Taken 1/14/2024 1456)  Electrolytes maintained within normal limits:   Monitor labs and assess patient for signs and symptoms of electrolyte imbalances   Administer electrolyte replacement as ordered  Goal: Hemodynamic stability and optimal renal function maintained  Outcome: Progressing  Flowsheets (Taken 1/14/2024 1456)  Hemodynamic stability and optimal renal function maintained:   Monitor labs and assess for signs and symptoms of volume excess or deficit   Monitor intake, output

## 2024-01-14 NOTE — PROGRESS NOTES
V2.0    [facility] Progress Note      Name:  Mike Patel /Age/Sex: 1940  (83 y.o. male)   MRN & CSN:  [id number] & [id number] Encounter Date/Time: 2024 1:02 PM EST   Location:  [facility] PCP: Mika Orellana MD     Attending:Fernando Johnston MD       Hospital Day: 6    Assessment and Recommendations     Active Hospital Problems    Diagnosis Date Noted    COVID-19 [U07.1] 2024     Acute hypoxic and hypercapnic respiratory failure - possible due to recurrent COVID-pneumonia with superimposed bacterial pneumonia with gram +ve/Atypical organisms  Presented with hypoxia, rales on chest auscultation and lower extremity edema, hypercapnia on VBG  Echo with EF 55% and indeterminate diastolic function, no significant valvular abnormalities   -- Repeat CXR shows worsening interstitial fracture  Will repeat XR for further assessment  Rise in Cr and borderline blood pressure so will hold lasix now  Continue DuoNebs  Follow up respiratory culture/PNA panel, Urine legionella/Strep   S/p CTPA with diffuse ground glass opacities  STILL DESATS on minimal exertion  Will ask pulmonary for evaluation     Hypotension - suspected due to diuresis, stop Lasix, start IVF x 10 hrs    Recurrent vs prolonged COVID-pneumonia  Resident of skilled nursing facility, presented with hypoxia, COVID-positive on lab test. S/p dose of dexamethasone in ED. No leukocytosis  Continue dexamethasone 6 mg daily for 10 days  Continue on Baricitinib, likely dc 1-2 days  Continue oxygen as needed  Continue levofloxacin emipirix rx, so far infectious obrien negative     Possible Acute exacerbation of diastolic heart failure  Hx of heart failure presented with shortness of breath, hypoxia, bilateral crackles on chest auscultation and lower extremity edema labs showed proBNP elevated  Appears euvolemic now  Cr did come back to 1.2  Hold diuretics  RFTs daily     Delirium/Depression/Mood disorder  Likely secondary to the Infection and

## 2024-01-15 LAB
ALBUMIN SERPL-MCNC: 2.5 G/DL (ref 3.4–5)
ALBUMIN SERPL-MCNC: 2.6 G/DL (ref 3.4–5)
ALBUMIN/GLOB SERPL: 0.8 {RATIO} (ref 1.1–2.2)
ALBUMIN/GLOB SERPL: 0.9 {RATIO} (ref 1.1–2.2)
ALP SERPL-CCNC: 81 U/L (ref 40–129)
ALP SERPL-CCNC: 83 U/L (ref 40–129)
ALT SERPL-CCNC: 10 U/L (ref 10–40)
ALT SERPL-CCNC: 11 U/L (ref 10–40)
ANION GAP SERPL CALCULATED.3IONS-SCNC: 11 MMOL/L (ref 3–16)
ANION GAP SERPL CALCULATED.3IONS-SCNC: 11 MMOL/L (ref 3–16)
AST SERPL-CCNC: 25 U/L (ref 15–37)
AST SERPL-CCNC: 40 U/L (ref 15–37)
BACTERIA BLD CULT ORG #2: NORMAL
BACTERIA BLD CULT: NORMAL
BILIRUB SERPL-MCNC: 0.3 MG/DL (ref 0–1)
BILIRUB SERPL-MCNC: 0.3 MG/DL (ref 0–1)
BUN SERPL-MCNC: 33 MG/DL (ref 7–20)
BUN SERPL-MCNC: 33 MG/DL (ref 7–20)
CALCIUM SERPL-MCNC: 8.6 MG/DL (ref 8.3–10.6)
CALCIUM SERPL-MCNC: 8.7 MG/DL (ref 8.3–10.6)
CHLORIDE SERPL-SCNC: 96 MMOL/L (ref 99–110)
CHLORIDE SERPL-SCNC: 98 MMOL/L (ref 99–110)
CO2 SERPL-SCNC: 23 MMOL/L (ref 21–32)
CO2 SERPL-SCNC: 23 MMOL/L (ref 21–32)
CREAT SERPL-MCNC: 1 MG/DL (ref 0.8–1.3)
CREAT SERPL-MCNC: 1 MG/DL (ref 0.8–1.3)
GFR SERPLBLD CREATININE-BSD FMLA CKD-EPI: >60 ML/MIN/{1.73_M2}
GFR SERPLBLD CREATININE-BSD FMLA CKD-EPI: >60 ML/MIN/{1.73_M2}
GLUCOSE BLD-MCNC: 106 MG/DL (ref 70–99)
GLUCOSE BLD-MCNC: 151 MG/DL (ref 70–99)
GLUCOSE BLD-MCNC: 203 MG/DL (ref 70–99)
GLUCOSE BLD-MCNC: 82 MG/DL (ref 70–99)
GLUCOSE SERPL-MCNC: 189 MG/DL (ref 70–99)
GLUCOSE SERPL-MCNC: 216 MG/DL (ref 70–99)
PERFORMED ON: ABNORMAL
PERFORMED ON: NORMAL
POTASSIUM SERPL-SCNC: 4.7 MMOL/L (ref 3.5–5.1)
POTASSIUM SERPL-SCNC: 5.7 MMOL/L (ref 3.5–5.1)
PROT SERPL-MCNC: 5.5 G/DL (ref 6.4–8.2)
PROT SERPL-MCNC: 5.6 G/DL (ref 6.4–8.2)
SODIUM SERPL-SCNC: 130 MMOL/L (ref 136–145)
SODIUM SERPL-SCNC: 132 MMOL/L (ref 136–145)

## 2024-01-15 PROCEDURE — 6370000000 HC RX 637 (ALT 250 FOR IP)

## 2024-01-15 PROCEDURE — 80053 COMPREHEN METABOLIC PANEL: CPT

## 2024-01-15 PROCEDURE — 94761 N-INVAS EAR/PLS OXIMETRY MLT: CPT

## 2024-01-15 PROCEDURE — 97535 SELF CARE MNGMENT TRAINING: CPT

## 2024-01-15 PROCEDURE — 2580000003 HC RX 258

## 2024-01-15 PROCEDURE — 97116 GAIT TRAINING THERAPY: CPT

## 2024-01-15 PROCEDURE — 97530 THERAPEUTIC ACTIVITIES: CPT

## 2024-01-15 PROCEDURE — 2060000000 HC ICU INTERMEDIATE R&B

## 2024-01-15 PROCEDURE — 6360000002 HC RX W HCPCS: Performed by: INTERNAL MEDICINE

## 2024-01-15 PROCEDURE — 36415 COLL VENOUS BLD VENIPUNCTURE: CPT

## 2024-01-15 PROCEDURE — 2700000000 HC OXYGEN THERAPY PER DAY

## 2024-01-15 PROCEDURE — 6370000000 HC RX 637 (ALT 250 FOR IP): Performed by: INTERNAL MEDICINE

## 2024-01-15 RX ADMIN — FUROSEMIDE 20 MG: 10 INJECTION, SOLUTION INTRAMUSCULAR; INTRAVENOUS at 09:07

## 2024-01-15 RX ADMIN — INSULIN LISPRO 3 UNITS: 100 INJECTION, SOLUTION INTRAVENOUS; SUBCUTANEOUS at 12:26

## 2024-01-15 RX ADMIN — INSULIN LISPRO 3 UNITS: 100 INJECTION, SOLUTION INTRAVENOUS; SUBCUTANEOUS at 17:19

## 2024-01-15 RX ADMIN — SERTRALINE HYDROCHLORIDE 25 MG: 25 TABLET ORAL at 09:07

## 2024-01-15 RX ADMIN — INSULIN LISPRO 2 UNITS: 100 INJECTION, SOLUTION INTRAVENOUS; SUBCUTANEOUS at 09:05

## 2024-01-15 RX ADMIN — LEVOFLOXACIN 750 MG: 750 TABLET, FILM COATED ORAL at 09:07

## 2024-01-15 RX ADMIN — SODIUM CHLORIDE, PRESERVATIVE FREE 10 ML: 5 INJECTION INTRAVENOUS at 09:08

## 2024-01-15 RX ADMIN — RIVAROXABAN 20 MG: 20 TABLET, FILM COATED ORAL at 17:20

## 2024-01-15 RX ADMIN — INSULIN LISPRO 3 UNITS: 100 INJECTION, SOLUTION INTRAVENOUS; SUBCUTANEOUS at 09:05

## 2024-01-15 ASSESSMENT — PAIN SCALES - GENERAL: PAINLEVEL_OUTOF10: 0

## 2024-01-15 NOTE — PROGRESS NOTES
Pt was calm, restful the majority of the night. This morning he was attempting to get out of bed. He also became mildly aggressive, but was easily redirectable. Music seems to help soothe him.

## 2024-01-15 NOTE — PROGRESS NOTES
Yes  Patient assessed for rehabilitation services?: Yes  Additional Pertinent Hx: 83 y.o. 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 Presents with AMS, agitation, delirium.  Family / Caregiver Present: Yes (wife)  Referring Practitioner: Fernando Johnston MD  Diagnosis: COVID 19, pneumonia  Subjective  Subjective: Pt in bed, pleasant and talkative, agreeable to therapy. \"This is my wife Marissa\"  Pain: none       Objective   Observation/Palpation  Observation: Pt on 8 L O2 supine, and RN increased O2 to 12 L O2 with activity.      ADL  LE Dressing: Dependent/Total (doff/don brief at bed level)  Toileting: Dependent/Total (external catheter in place, brief soiled, assist for hygiene and donning clean brief at bed level)    Activity Tolerance  Activity Tolerance: Patient limited by endurance  Activity Tolerance Comments: Pt SOB with bed mobility on 12L. Unable to obtain accurate O2 sats, nursing present    Bed mobility  Rolling to Left: Minimal assistance  Rolling to Right: Minimal assistance  Supine to Sit: Dependent/Total (mod +min A; max verbal and tactile cues to initiate)  Sit to Supine: Minimal assistance  Scooting: Moderate assistance  Bed Mobility Comments: Using handrail    Sitting Balance: initial mod A progressing to SBA at edge of bed (seated x15 minutes)  Standing Balance: min A of 2, bilateral hand held assist  Time in Stance: 15 seconds    Transfers  Sit to stand: Dependent/Total (min A of 2 from bed)  Stand to sit: Dependent/Total (min A of 2 to bed)  Transfer Comments: Pt took 2 lateral steps towards HOB with min A of 2 and bilateral hand held assist    Cognition  Overall Cognitive Status: Exceptions  Following Commands: Follows one step commands with increased time;Follows one step commands with repetition  Memory: Decreased recall  of recent events;Decreased recall of biographical Information  Safety Judgement: Decreased awareness of need for safety  Problem Solving: Decreased awareness of errors  Insights: Decreased awareness of deficits  Initiation: Requires cues for all  Sequencing: Requires cues for all  Orientation  Orientation Level: Oriented to person;Disoriented to time;Disoriented to place;Disoriented to situation    Education Given To: Patient (wife)  Education Provided: Role of Therapy;Plan of Care;ADL Adaptive Strategies;Transfer Training;Precautions  Education Method: Verbal  Barriers to Learning: Cognition (no learning noted)  Education Outcome: Continued education needed    AM-PAC - ADL  AM-PAC Daily Activity - Inpatient   How much help is needed for putting on and taking off regular lower body clothing?: Total  How much help is needed for bathing (which includes washing, rinsing, drying)?: Total  How much help is needed for toileting (which includes using toilet, bedpan, or urinal)?: Total  How much help is needed for putting on and taking off regular upper body clothing?: A Lot  How much help is needed for taking care of personal grooming?: A Little  How much help for eating meals?: A Little  AM-[facility] Inpatient Daily Activity Raw Score: 11  AM-PAC Inpatient ADL T-Scale Score : 29.04  ADL Inpatient CMS 0-100% Score: 70.42  ADL Inpatient CMS G-Code Modifier : CL    Safety Devices  Type of Devices: Call light within reach;Nurse notified;All fall risk precautions in place;Bed alarm in place;Left in bed    Goals  Short Term Goals  Time Frame for Short Term Goals: by discharge  Short Term Goal 1: tolerate EOB sitting during functional activity with SBA and stable vitals x15 minutes- ongoing  Short Term Goal 2: perform stance with SBA x2 minutes in preparation for ADLs- not met  Short Term Goal 3: complete BSC/commode transfer with SBA using LRAD- not met  Short Term Goal 4: perform 1x10-15 BUE exercises with min  A- not met  Patient

## 2024-01-15 NOTE — PLAN OF CARE
Problem: Discharge Planning  Goal: Discharge to home or other facility with appropriate resources  Flowsheets (Taken 1/15/2024 1425)  Discharge to home or other facility with appropriate resources:   Identify barriers to discharge with patient and caregiver   Arrange for needed discharge resources and transportation as appropriate     Problem: Respiratory - Adult  Goal: Achieves optimal ventilation and oxygenation  Flowsheets (Taken 1/15/2024 1425)  Achieves optimal ventilation and oxygenation:   Assess for changes in mentation and behavior   Assess for changes in respiratory status   Position to facilitate oxygenation and minimize respiratory effort   Respiratory therapy support as indicated   Assess and instruct to report shortness of breath or any respiratory difficulty   Oxygen supplementation based on oxygen saturation or arterial blood gases     Problem: Cardiovascular - Adult  Goal: Maintains optimal cardiac output and hemodynamic stability  Flowsheets (Taken 1/15/2024 1425)  Maintains optimal cardiac output and hemodynamic stability:   Monitor blood pressure and heart rate   Assess for signs of decreased cardiac output   Administer fluid and/or volume expanders as ordered  Goal: Absence of cardiac dysrhythmias or at baseline  Flowsheets (Taken 1/15/2024 1425)  Absence of cardiac dysrhythmias or at baseline:   Monitor cardiac rate and rhythm   Assess for signs of decreased cardiac output     Problem: Metabolic/Fluid and Electrolytes - Adult  Goal: Electrolytes maintained within normal limits  Flowsheets (Taken 1/15/2024 1425)  Electrolytes maintained within normal limits:   Monitor labs and assess patient for signs and symptoms of electrolyte imbalances   Administer electrolyte replacement as ordered   Monitor response to electrolyte replacements, including repeat lab results as appropriate   Fluid restriction as ordered  Goal: Hemodynamic stability and optimal renal function maintained  Flowsheets (Taken

## 2024-01-15 NOTE — PROGRESS NOTES
Physical Therapy  [address]  Physical Therapy Daily Treatment Note    Name: Mike Patel  : 1940  MRN: [id number]  Date of Service: 1/15/2024    Discharge Recommendations:  Subacute/Skilled Nursing Facility   PT Equipment Recommendations  Equipment Needed:  (Defer)      Patient Diagnosis(es): The primary encounter diagnosis was Pneumonia due to COVID-19 virus. A diagnosis of Acute on chronic hypoxic respiratory failure (HCC) was also pertinent to this visit.  Past Medical History:  has a past medical history of Diastolic CHF (HCC) and Non Hodgkin's lymphoma (HCC).  Past Surgical History:  has a past surgical history that includes lymph node biopsy and IR PORT PLACEMENT < 5 YEARS.    Assessment   Assessment: Pt with limited mobility due to questionable low O2 sats, difficult to get accurate reading.  Pt on 8 L O2, but increased to 12 L O2 during activity by RN.  Pt able to stand and take 2 sidesteps with HHA/min assist x 2.  Pt pleasantly confused throughout treatment, wife present and somewhat irritable with staff.  Rec SNF at d/c.  Will follow.  Treatment Diagnosis: Decreased endurance for activity  Therapy Prognosis: Good  Decision Making: Medium Complexity  Barriers to Learning: Cognitive deficits  Requires PT Follow-Up: Yes  Activity Tolerance  Activity Tolerance: Patient limited by endurance  Activity Tolerance Comments: Unable to obtain accurate O2 sats, nursing present     Plan   Physical Therapy Plan  General Plan:  (2-5)  Current Treatment Recommendations: Strengthening, Balance training, Functional mobility training, Transfer training, Cognitive/Perceptual training, Endurance training, Gait training, Stair training, Cognitive reorientation, Pain management, Safety education & training, Patient/Caregiver education & training, Equipment evaluation, education, & procurement, Therapeutic activities  Safety Devices  Type of Devices: Call light within reach, Nurse notified, All  [facility].    Vision/Hearing  Vision  Vision: Within Functional Limits  Hearing  Hearing: Within functional limits      Cognition   Orientation  Orientation Level: Oriented to person;Disoriented to time;Disoriented to place;Disoriented to situation  Cognition  Following Commands: Follows one step commands with increased time;Follows one step commands with repetition  Memory: Decreased recall of recent events;Decreased recall of biographical Information  Safety Judgement: Decreased awareness of need for safety  Initiation: Requires cues for all  Sequencing: Requires cues for all     Objective   Observation/Palpation  Observation: Pt on 8 L O2 supine, and RN increased O2 to 12 L O2 with activity.  Pt with make purewick.    AROM RLE (degrees)  RLE AROM: WFL  AROM LLE (degrees)  LLE AROM : WFL    Transfers  Sit to Stand: 2 Person Assistance;Minimal Assistance  Stand to Sit: 2 Person Assistance;Minimal Assistance    Ambulation  Device: Hand-Held Assist  Assistance: 2 Person assistance;Minimal assistance  Gait Deviations: Slow Jamila;Decreased step length;Decreased step height  Distance: 2 sidesteps EOB    Balance  Sitting - Static: +;Poor  Comments: Mod to SBA due to posterior lean    AM-PAC - Mobility  AM-PAC Basic Mobility - Inpatient   How much help is needed turning from your back to your side while in a flat bed without using bedrails?: A Lot  How much help is needed moving from lying on your back to sitting on the side of a flat bed without using bedrails?: A Lot  How much help is needed moving to and from a bed to a chair?: A Lot  How much help is needed standing up from a chair using your arms?: A Lot  How much help is needed walking in hospital room?: A Lot  How much help is needed climbing 3-5 steps with a railing?: Total  AM-PAC Inpatient Mobility Raw Score : 11  AM-PAC Inpatient T-Scale Score : 33.86  Mobility Inpatient CMS 0-100% Score: 72.57  Mobility Inpatient CMS G-Code Modifier : CL    Goals  Short Term

## 2024-01-15 NOTE — CONSULTS
Pulmonary Consult    Patient's PCP: Mika Orellana MD  Referred by:   Fernando Johnston MD for acute hypoxic respiratory failure     HISTORY OF PRESENT ILLNESS:    This is a  83 y.o. male with PMH of CHF, COVID infection in Nov and others as listed below presented to the hospital with SOB and hypoxia.  At the time of evaluation he was confused and unable to give history.  He was able to speak full sentences.  Breathing was not labored.  He was on 6 liters O2.  At baseline he is on 3 liters.      Past Medical / Surgical History:    Past Medical History:   Diagnosis Date    Diastolic CHF (HCC)     Non Hodgkin's lymphoma (HCC)     Type D     Past Surgical History:   Procedure Laterality Date    IR PORT PLACEMENT < 5 YEARS      LYMPH NODE BIOPSY       Prior to Admission:    No current facility-administered medications on file prior to encounter.     Current Outpatient Medications on File Prior to Encounter   Medication Sig Dispense Refill    bisacodyl (DULCOLAX) 10 MG suppository Place 1 suppository rectally daily as needed for Constipation (Constipation)      hydrOXYzine HCl (ATARAX) 25 MG tablet Take 1 tablet by mouth every 6 hours as needed for Anxiety (increased anxiety and behaviors)      magnesium hydroxide (MILK OF MAGNESIA) 400 MG/5ML suspension Take 30 mLs by mouth daily as needed for Constipation      OLANZapine (ZYPREXA) 2.5 MG tablet Take 1 tablet by mouth nightly      albuterol (PROVENTIL) (2.5 MG/3ML) 0.083% nebulizer solution Take 3 mLs by nebulization every 6 hours as needed for Wheezing or Shortness of Breath (Patient not taking: Reported on 1/9/2024) 120 each 0    insulin glargine (LANTUS) 100 UNIT/ML injection vial Inject 13 Units into the skin nightly 10 mL 3    spironolactone (ALDACTONE) 50 MG tablet Take 1 tablet by mouth daily 30 tablet 3    guaiFENesin (MUCINEX) 600 MG extended release tablet Take 1 tablet by mouth 2 times daily 60 tablet 0    rivaroxaban (XARELTO) 20 MG TABS tablet Take 1                                           Recent Labs     01/12/24  0544 01/13/24  0538 01/14/24  0519   * 131* 132*   K 5.1 4.7 4.2   CL 93* 92* 95*   CO2 28 25 25   BUN 32* 44* 40*   CREATININE 1.2 1.5* 1.1   GLUCOSE 193* 169* 247*     Recent Labs     01/12/24  0544 01/13/24  0538 01/14/24  0519   AST 21 21 25   ALT 12 10 10   BILITOT 0.4 0.5 0.3   ALKPHOS 83 85 79     No results for input(s): \"TROPONINI\" in the last 72 hours.  No results for input(s): \"BNP\" in the last 72 hours.  Lab Results   Component Value Date/Time    PHART 7.447 01/09/2024 08:50 AM    MAB1UYF 32.4 01/09/2024 08:50 AM    PO2ART 161.0 01/09/2024 08:50 AM     No results for input(s): \"INR\" in the last 72 hours.  No results for input(s): \"NITRITE\", \"COLORU\", \"PHUR\", \"LABCAST\", \"WBCUA\", \"RBCUA\", \"MUCUS\", \"TRICHOMONAS\", \"YEAST\", \"BACTERIA\", \"CLARITYU\", \"SPECGRAV\", \"LEUKOCYTESUR\", \"UROBILINOGEN\", \"BILIRUBINUR\", \"BLOODU\", \"GLUCOSEU\", \"AMORPHOUS\" in the last 72 hours.    Invalid input(s): \"KETONESU\"     DATA:  CT chest  IMPRESSION:     1. No CT findings of pulmonary thromboembolism on the current exam.     2. Extensive diffuse bilateral patchy and groundglass airspace opacities most  pronounced in the lung bases.     3. Mild cardiomegaly.    CT abdomen  IMPRESSION:     1. No findings for acute intra-abdominal or pelvic abnormality.     2. Nonobstructive bowel gas pattern.     3. Small umbilical hernia containing fat.     4. Prostatic hypertrophy.     5. Bilateral inguinal hernias containing fat.     Echo on 12/5/23  Summary   Technically difficult examination.   Normal left ventricle size, wall thickness, and systolic function.   Difficult to estimate accurately due to rhythm and TDS but appears preserved   with EF 55%.   No regional wall motion abnormalities   Indeterminate diastolic function.   Aortic valve appears sclerotic but opens adequately.   There is no aortic valve regurgitation or stenosis.   Mild mitral regurgitation.   There is

## 2024-01-15 NOTE — CARE COORDINATION
CM spoke with wife at bedside regarding DCP- adamant that pt will not be discharging anytime soon. Refusing to make discharge decisions: giving more facility options, choosing an accepting facility, etc. Agreeable to CM calling and speaking with dgtr, Gissell, regarding DCP.     CM spoke with Gissell via phone, states plan is to discharge to SNF with the ultimate goal being to return home with HHC/aides services. States their top preference is [facility]- CM called referral to Fel[facility], stated she would call back in the morning with decision, but that they may not have any male beds available. Gissell stated they were working on trying to get pt a portable shoulder concentrator to make mobility easier, as he uses a walker at baseline- O2 via [employer], CM call liaisonAbigail, regarding getting shoulder concentrator. Will get back to CM.    Thank you  Cat Mark RUGGIERO, BSN, CM  PCU   [phone]

## 2024-01-15 NOTE — PROGRESS NOTES
in size and configuration. Pulmonary vascularity appears within normal limits. Subtle opacities are visualized at the left lung base that may represent regional peribronchial thickening. Previously noted right basilar opacities have improved. No evidence of pleural effusion. Bony thorax appears unchanged.     1. Residual opacities at left lung base favoring peribronchial thickening. Etiologies would include reactive airway disease, bronchitis, viral syndrome. 2. Improved appearance of opacities at right lung base.       CBC:   Recent Labs     01/13/24  0538 01/14/24  0519   WBC 8.1 7.0   HGB 10.5* 9.9*    311     BMP:    Recent Labs     01/14/24  0519 01/15/24  0624 01/15/24  0839   * 130* 132*   K 4.2 5.7* 4.7   CL 95* 96* 98*   CO2 25 23 23   BUN 40* 33* 33*   CREATININE 1.1 1.0 1.0   GLUCOSE 247* 216* 189*     Hepatic:   Recent Labs     01/14/24  0519 01/15/24  0624 01/15/24  0839   AST 25 40* 25   ALT 10 11 10   BILITOT 0.3 0.3 0.3   ALKPHOS 79 81 83     Lipids:   Lab Results   Component Value Date/Time    CHOL 177 12/04/2023 05:51 AM    HDL 27 12/04/2023 05:51 AM    TRIG 112 12/04/2023 05:51 AM     Hemoglobin A1C:   Lab Results   Component Value Date/Time    LABA1C 9.1 12/13/2023 02:07 AM     TSH:   Lab Results   Component Value Date/Time    TSH 0.68 12/03/2023 06:27 AM     Troponin: No results found for: \"TROPONINT\"  Lactic Acid:   Recent Labs     01/14/24  0650   LACTA 1.1     BNP: No results for input(s): \"PROBNP\" in the last 72 hours.  UA:  Lab Results   Component Value Date/Time    NITRU Negative 12/13/2023 02:07 AM    COLORU Yellow 12/13/2023 02:07 AM    PHUR 5.5 12/13/2023 02:07 AM    WBCUA 0-2 12/13/2023 02:07 AM    RBCUA  12/13/2023 02:07 AM    MUCUS Rare 11/12/2023 05:56 PM    BACTERIA 2+ 12/13/2023 02:07 AM    CLARITYU Clear 12/13/2023 02:07 AM    SPECGRAV >=1.030 12/13/2023 02:07 AM    LEUKOCYTESUR Negative 12/13/2023 02:07 AM    UROBILINOGEN 0.2 12/13/2023 02:07 AM    BILIRUBINUR

## 2024-01-16 ENCOUNTER — APPOINTMENT (OUTPATIENT)
Dept: GENERAL RADIOLOGY | Age: 84
End: 2024-01-16
Attending: INTERNAL MEDICINE
Payer: MEDICARE

## 2024-01-16 LAB
ALBUMIN SERPL-MCNC: 2.9 G/DL (ref 3.4–5)
ANION GAP SERPL CALCULATED.3IONS-SCNC: 11 MMOL/L (ref 3–16)
BUN SERPL-MCNC: 32 MG/DL (ref 7–20)
CALCIUM SERPL-MCNC: 9 MG/DL (ref 8.3–10.6)
CHLORIDE SERPL-SCNC: 95 MMOL/L (ref 99–110)
CO2 SERPL-SCNC: 27 MMOL/L (ref 21–32)
CREAT SERPL-MCNC: 1.2 MG/DL (ref 0.8–1.3)
GFR SERPLBLD CREATININE-BSD FMLA CKD-EPI: 60 ML/MIN/{1.73_M2}
GLUCOSE BLD-MCNC: 100 MG/DL (ref 70–99)
GLUCOSE BLD-MCNC: 117 MG/DL (ref 70–99)
GLUCOSE BLD-MCNC: 146 MG/DL (ref 70–99)
GLUCOSE BLD-MCNC: 89 MG/DL (ref 70–99)
GLUCOSE BLD-MCNC: 97 MG/DL (ref 70–99)
GLUCOSE SERPL-MCNC: 133 MG/DL (ref 70–99)
PERFORMED ON: ABNORMAL
PERFORMED ON: NORMAL
PERFORMED ON: NORMAL
PHOSPHATE SERPL-MCNC: 2.5 MG/DL (ref 2.5–4.9)
POTASSIUM SERPL-SCNC: 3.9 MMOL/L (ref 3.5–5.1)
SODIUM SERPL-SCNC: 133 MMOL/L (ref 136–145)

## 2024-01-16 PROCEDURE — 6370000000 HC RX 637 (ALT 250 FOR IP)

## 2024-01-16 PROCEDURE — 2580000003 HC RX 258

## 2024-01-16 PROCEDURE — 94761 N-INVAS EAR/PLS OXIMETRY MLT: CPT

## 2024-01-16 PROCEDURE — 97530 THERAPEUTIC ACTIVITIES: CPT

## 2024-01-16 PROCEDURE — 80069 RENAL FUNCTION PANEL: CPT

## 2024-01-16 PROCEDURE — 6360000002 HC RX W HCPCS: Performed by: INTERNAL MEDICINE

## 2024-01-16 PROCEDURE — 97535 SELF CARE MNGMENT TRAINING: CPT

## 2024-01-16 PROCEDURE — 36415 COLL VENOUS BLD VENIPUNCTURE: CPT

## 2024-01-16 PROCEDURE — 84443 ASSAY THYROID STIM HORMONE: CPT

## 2024-01-16 PROCEDURE — 2700000000 HC OXYGEN THERAPY PER DAY

## 2024-01-16 PROCEDURE — 2060000000 HC ICU INTERMEDIATE R&B

## 2024-01-16 PROCEDURE — 6370000000 HC RX 637 (ALT 250 FOR IP): Performed by: INTERNAL MEDICINE

## 2024-01-16 PROCEDURE — 84439 ASSAY OF FREE THYROXINE: CPT

## 2024-01-16 PROCEDURE — 71045 X-RAY EXAM CHEST 1 VIEW: CPT

## 2024-01-16 RX ORDER — ACETAMINOPHEN 500 MG
500 TABLET ORAL ONCE
Status: COMPLETED | OUTPATIENT
Start: 2024-01-16 | End: 2024-01-16

## 2024-01-16 RX ADMIN — INSULIN LISPRO 3 UNITS: 100 INJECTION, SOLUTION INTRAVENOUS; SUBCUTANEOUS at 17:23

## 2024-01-16 RX ADMIN — SODIUM CHLORIDE, PRESERVATIVE FREE 10 ML: 5 INJECTION INTRAVENOUS at 09:46

## 2024-01-16 RX ADMIN — RIVAROXABAN 20 MG: 20 TABLET, FILM COATED ORAL at 17:24

## 2024-01-16 RX ADMIN — SODIUM CHLORIDE, PRESERVATIVE FREE 10 ML: 5 INJECTION INTRAVENOUS at 20:08

## 2024-01-16 RX ADMIN — INSULIN LISPRO 3 UNITS: 100 INJECTION, SOLUTION INTRAVENOUS; SUBCUTANEOUS at 12:13

## 2024-01-16 RX ADMIN — ACETAMINOPHEN 500 MG: 500 TABLET ORAL at 10:31

## 2024-01-16 RX ADMIN — SODIUM CHLORIDE, PRESERVATIVE FREE 10 ML: 5 INJECTION INTRAVENOUS at 00:11

## 2024-01-16 RX ADMIN — INSULIN LISPRO 3 UNITS: 100 INJECTION, SOLUTION INTRAVENOUS; SUBCUTANEOUS at 09:39

## 2024-01-16 RX ADMIN — FUROSEMIDE 20 MG: 10 INJECTION, SOLUTION INTRAMUSCULAR; INTRAVENOUS at 09:39

## 2024-01-16 RX ADMIN — SERTRALINE HYDROCHLORIDE 25 MG: 25 TABLET ORAL at 09:39

## 2024-01-16 ASSESSMENT — PAIN SCALES - GENERAL
PAINLEVEL_OUTOF10: 0

## 2024-01-16 NOTE — PROGRESS NOTES
Occupational Therapy  Occupational Therapy  Daily Treatment Note  Patient Name: Mike Patel  MRN: [id number]    Chart Reviewed: Yes       Other position/activity restrictions: up as tolerated     Additional Pertinent Hx: Mike Patel is a 83 y.o. 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      Diagnosis: COVID 19, pneumonia  Treatment Diagnosis: impaired ADLs, transfers    Subjective: \"I am quite comfortable here\" Patient in bed upon arrival with wife at bedside. Agreeable to OT.     Pain: denies pain     Social/Functional History  Lives With: Spouse  Type of Home: House  Home Layout: Multi-level, Bed/Bath upstairs  Home Access: Stairs to enter with rails  Entrance Stairs - Number of Steps: 1  Entrance Stairs - Rails: Right  Bathroom Shower/Tub: Tub/Shower unit  Bathroom Toilet: Handicap height  Bathroom Equipment: None  Home Equipment: Walker, rolling  Has the patient had two or more falls in the past year or any fall with injury in the past year?: Yes (\"a lot\")  Receives Help From: Family (Prior to hospitalization, pt living in SNF requiring consistent assistance.)  ADL Assistance: Needs assistance (required some assistance with all activity in SNF)  Homemaking Assistance: Needs assistance (required some assistance with all activity in SNF)  Ambulation Assistance: Needs assistance (used RW and WC in SNF with assistance)  Transfer Assistance: Needs assistance (Required assistance in SNF)  Active : No  Additional Comments: Pt is a poor historian due to cognitive deficits, wife present and reported social/functional history. Pt was in SNF for 4 weeks prior to admission to hospital.  Prior Function  Receives Help From: Family (Prior to hospitalization, pt living in SNF requiring consistent assistance.)  ADL Assistance: Needs

## 2024-01-16 NOTE — PLAN OF CARE
Problem: Discharge Planning  Goal: Discharge to home or other facility with appropriate resources  1/15/2024 2219 by Cammy Ceron RN  Outcome: Progressing  Flowsheets (Taken 1/15/2024 1425 by Sharon Herrera RN)  Discharge to home or other facility with appropriate resources:   Identify barriers to discharge with patient and caregiver   Arrange for needed discharge resources and transportation as appropriate     Problem: Respiratory - Adult  Goal: Achieves optimal ventilation and oxygenation  1/15/2024 2219 by Cammy Ceron RN  Outcome: Progressing  Flowsheets (Taken 1/15/2024 1425 by Sharon Herrera RN)  Achieves optimal ventilation and oxygenation:   Assess for changes in mentation and behavior   Assess for changes in respiratory status   Position to facilitate oxygenation and minimize respiratory effort   Respiratory therapy support as indicated   Assess and instruct to report shortness of breath or any respiratory difficulty   Oxygen supplementation based on oxygen saturation or arterial blood gases     Problem: Cardiovascular - Adult  Goal: Maintains optimal cardiac output and hemodynamic stability  1/15/2024 2219 by Cammy Ceron RN  Outcome: Progressing  Flowsheets (Taken 1/15/2024 1425 by Sharon Herrera RN)  Maintains optimal cardiac output and hemodynamic stability:   Monitor blood pressure and heart rate   Assess for signs of decreased cardiac output   Administer fluid and/or volume expanders as ordered     Problem: Cardiovascular - Adult  Goal: Absence of cardiac dysrhythmias or at baseline  1/15/2024 2219 by Cammy Ceron RN  Outcome: Progressing  Flowsheets (Taken 1/15/2024 1425 by Sharon Herrera RN)  Absence of cardiac dysrhythmias or at baseline:   Monitor cardiac rate and rhythm   Assess for signs of decreased cardiac output     Problem: Metabolic/Fluid and Electrolytes - Adult  Goal: Electrolytes maintained within normal  short contacts to provide reality reorientation, refocusing and direction     Problem: Metabolic/Fluid and Electrolytes - Adult  Goal: Hemodynamic stability and optimal renal function maintained  1/15/2024 1425 by Sharon Herrera, RN  Flowsheets (Taken 1/15/2024 1425)  Hemodynamic stability and optimal renal function maintained:   Monitor labs and assess for signs and symptoms of volume excess or deficit   Monitor intake, output and patient weight   Encourage oral intake as appropriate     Problem: Metabolic/Fluid and Electrolytes - Adult  Goal: Hemodynamic stability and optimal renal function maintained  1/15/2024 1425 by Sharon Herrera RN  Flowsheets (Taken 1/15/2024 1425)  Hemodynamic stability and optimal renal function maintained:   Monitor labs and assess for signs and symptoms of volume excess or deficit   Monitor intake, output and patient weight   Encourage oral intake as appropriate     Problem: ABCDS Injury Assessment  Goal: Absence of physical injury  Flowsheets (Taken 1/15/2024 2219)  Absence of Physical Injury: Implement safety measures based on patient assessment

## 2024-01-16 NOTE — PROGRESS NOTES
V2.0    [facility] Progress Note      Name:  Mike Patel /Age/Sex: 1940  (83 y.o. male)   MRN & CSN:  [id number] & [id number] Encounter Date/Time: 2024 1:02 PM EST   Location:  On license of [facility] PCP: Mika Orellana MD     Attending:Fernando Johnston MD       Hospital Day: 8    Assessment and Recommendations     Active Hospital Problems    Diagnosis Date Noted    COVID-19 [U07.1] 2024     Acute hypoxic and hypercapnic respiratory failure - possible due to recurrent COVID-pneumonia with superimposed bacterial pneumonia with gram +ve/Atypical organisms  Presented with hypoxia, rales on chest auscultation and lower extremity edema, hypercapnia on VBG  Echo with EF 55% and indeterminate diastolic function, no significant valvular abnormalities  Recurrent vs prolonged COVID-pneumonia ruled out, unlikely.  Decadron and baricitinib stopped  S/p completion of Abx 5 days of Levaquin  Continue DuoNebs, Acapella  Mucolytic Mucinex bid  CT pulm embolism protocol without evidence of pulm embolism, diffuse groundglass opacities, discussed with Dr. Dior, does not feel recurrent COVID-19 or prolonged COVID-19 leading to symptoms.  Continue IV Lasix  SBP in 80s ok to continue diuresis, Cr stable and patient is not symptomatic    Hypotension -- check TSH, Free T4, check Am cortisol    Possible Acute exacerbation of diastolic heart failure  Hx of heart failure presented with shortness of breath, hypoxia, bilateral crackles on chest auscultation and lower extremity edema labs showed proBNP elevated  Continue diuresis, monitor renal function  May have to accept higher creatinine for symptomatic benefit     Delirium/Depression/Mood disorder: In the setting of acute illness  Unclear baseline, per wife has been off possible since October when he was diagnosed with covid  Delirium precuations  Please open windows and give day light  Try Haldol or Zyprexa if non pharmacological measure are not working  Started on

## 2024-01-16 NOTE — CARE COORDINATION
ALBERTA continues to work on DCP. Received VM from Felipa at [facility]- no private rooms available at this time, also stated that family would have to have private duty care set up prior to DC for safety purposes.     ALBERTA spoke with Rossana at [facility]- working to see if they would be able to accept pt to their skilled side, as pt had previously been accepted to memory care side. Rossana will get back to me.    ALBERTA attempted to call and update dgGissell jack, with this information- no answer, ALBERTA LVM.    Pt on 15L O2 today, not medically ready for DC.    Thank you  Cat Mark RN, BSN,   PCU   [phone]

## 2024-01-16 NOTE — PROGRESS NOTES
Physical Therapy  Facility/Department: [address]  Physical Therapy Treatment/Co-Treatment with OT    Name: Mike Patel  : 1940  MRN: [id number]  Date of Service: 2024    Discharge Recommendations:  Subacute/Skilled Nursing Facility   PT Equipment Recommendations  Other: plan to continue to assess and likely defer recommendations to the next level of care      Patient Diagnosis(es): The primary encounter diagnosis was Pneumonia due to COVID-19 virus. A diagnosis of Acute on chronic hypoxic respiratory failure (HCC) was also pertinent to this visit.  Past Medical History:  has a past medical history of Diastolic CHF (HCC) and Non Hodgkin's lymphoma (HCC).  Past Surgical History:  has a past surgical history that includes lymph node biopsy and IR PORT PLACEMENT < 5 YEARS.    Assessment   Body Structures, Functions, Activity Limitations Requiring Skilled Therapeutic Intervention: Decreased functional mobility ;Decreased strength;Decreased safe awareness;Decreased cognition;Decreased endurance;Decreased balance;Decreased coordination;Decreased ADL status  Assessment: Patient demonstrates impaired functional mobility, limited this date by low BP.  Patient on 10L O2 via nasal cannula, desaturating briefly to 82% but recovering in < 1 minute with semifowlers rest.  Patient unable to tolerate OOB or standing this date.  PT recommends SNF at this time secondary to current level of (A) to perform all mobility attempts.  Patient will continue to benefit from additional skilled PT intervention to facilitate safe mobility and to optimize (I) to promote return to prior level of function.  Treatment Diagnosis: impaired functional mobility  Therapy Prognosis: Good;Guarded  Barriers to Learning: cognition, dementia  Requires PT Follow-Up: Yes  Activity Tolerance  Activity Tolerance: Patient limited by endurance;Patient limited by fatigue;Treatment limited secondary to decreased cognition  Activity Tolerance  Goals  Time Frame for Short Term Goals: by dc - all goals ongoing 1/16  Short Term Goal 1: Pt to perform bed mobility with SBA  ONGOING  Short Term Goal 2: Pt to perform STS transfers SBA with LRAD  ONGOING  Short Term Goal 3: Pt to perform ambulation 50 ft SBA with LRAD  OGOING  Short Term Goal 4: Pt to perform 1 step navigation CGA with LRAD  ONGOING  Patient Goals   Patient Goals : per chart, SNF       Education  Patient Education  Education Given To: Patient;Family  Education Provided: Role of Therapy;Plan of Care  Education Provided Comments: use of call light, PT recommendations  Education Method: Demonstration;Verbal  Barriers to Learning: Cognition  Education Outcome: Continued education needed      Therapy Time   Individual Concurrent Group Co-treatment   Time In       1321   Time Out       1350   Minutes       29        Timed Code Treatment Minutes: 29 minutes    Total Treatment Minutes: 29 Minutes    If patient discharges prior to next treatment, this note will serve as discharge summary.    Yamileth March PT, DPT #[id number]

## 2024-01-17 LAB
ALBUMIN SERPL-MCNC: 2.8 G/DL (ref 3.4–5)
ANION GAP SERPL CALCULATED.3IONS-SCNC: 13 MMOL/L (ref 3–16)
BUN SERPL-MCNC: 33 MG/DL (ref 7–20)
CALCIUM SERPL-MCNC: 8.7 MG/DL (ref 8.3–10.6)
CHLORIDE SERPL-SCNC: 95 MMOL/L (ref 99–110)
CO2 SERPL-SCNC: 25 MMOL/L (ref 21–32)
CORTIS SERPL-MCNC: 17.6 UG/DL
CREAT SERPL-MCNC: 1.1 MG/DL (ref 0.8–1.3)
GFR SERPLBLD CREATININE-BSD FMLA CKD-EPI: >60 ML/MIN/{1.73_M2}
GLUCOSE BLD-MCNC: 100 MG/DL (ref 70–99)
GLUCOSE BLD-MCNC: 103 MG/DL (ref 70–99)
GLUCOSE BLD-MCNC: 138 MG/DL (ref 70–99)
GLUCOSE BLD-MCNC: 149 MG/DL (ref 70–99)
GLUCOSE SERPL-MCNC: 89 MG/DL (ref 70–99)
PERFORMED ON: ABNORMAL
PHOSPHATE SERPL-MCNC: 2.7 MG/DL (ref 2.5–4.9)
POTASSIUM SERPL-SCNC: 3.8 MMOL/L (ref 3.5–5.1)
SODIUM SERPL-SCNC: 133 MMOL/L (ref 136–145)
T4 FREE SERPL-MCNC: 1.1 NG/DL (ref 0.9–1.8)
TSH SERPL DL<=0.005 MIU/L-ACNC: 0.84 UIU/ML (ref 0.27–4.2)

## 2024-01-17 PROCEDURE — 2060000000 HC ICU INTERMEDIATE R&B

## 2024-01-17 PROCEDURE — 6370000000 HC RX 637 (ALT 250 FOR IP)

## 2024-01-17 PROCEDURE — 6360000002 HC RX W HCPCS: Performed by: INTERNAL MEDICINE

## 2024-01-17 PROCEDURE — 82533 TOTAL CORTISOL: CPT

## 2024-01-17 PROCEDURE — 51798 US URINE CAPACITY MEASURE: CPT

## 2024-01-17 PROCEDURE — 6370000000 HC RX 637 (ALT 250 FOR IP): Performed by: INTERNAL MEDICINE

## 2024-01-17 PROCEDURE — 94761 N-INVAS EAR/PLS OXIMETRY MLT: CPT

## 2024-01-17 PROCEDURE — 36415 COLL VENOUS BLD VENIPUNCTURE: CPT

## 2024-01-17 PROCEDURE — 99232 SBSQ HOSP IP/OBS MODERATE 35: CPT | Performed by: INTERNAL MEDICINE

## 2024-01-17 PROCEDURE — 2580000003 HC RX 258

## 2024-01-17 PROCEDURE — 80069 RENAL FUNCTION PANEL: CPT

## 2024-01-17 PROCEDURE — 2700000000 HC OXYGEN THERAPY PER DAY

## 2024-01-17 RX ORDER — FUROSEMIDE 10 MG/ML
20 INJECTION INTRAMUSCULAR; INTRAVENOUS ONCE
Status: COMPLETED | OUTPATIENT
Start: 2024-01-17 | End: 2024-01-17

## 2024-01-17 RX ORDER — IPRATROPIUM BROMIDE AND ALBUTEROL SULFATE 2.5; .5 MG/3ML; MG/3ML
1 SOLUTION RESPIRATORY (INHALATION) EVERY 4 HOURS PRN
Status: DISCONTINUED | OUTPATIENT
Start: 2024-01-17 | End: 2024-01-19 | Stop reason: HOSPADM

## 2024-01-17 RX ORDER — FUROSEMIDE 10 MG/ML
40 INJECTION INTRAMUSCULAR; INTRAVENOUS DAILY
Status: DISCONTINUED | OUTPATIENT
Start: 2024-01-18 | End: 2024-01-18

## 2024-01-17 RX ORDER — GUAIFENESIN 600 MG/1
600 TABLET, EXTENDED RELEASE ORAL 2 TIMES DAILY
Status: DISCONTINUED | OUTPATIENT
Start: 2024-01-17 | End: 2024-01-19 | Stop reason: HOSPADM

## 2024-01-17 RX ORDER — IPRATROPIUM BROMIDE AND ALBUTEROL SULFATE 2.5; .5 MG/3ML; MG/3ML
1 SOLUTION RESPIRATORY (INHALATION)
Status: DISCONTINUED | OUTPATIENT
Start: 2024-01-17 | End: 2024-01-17

## 2024-01-17 RX ADMIN — SODIUM CHLORIDE, PRESERVATIVE FREE 10 ML: 5 INJECTION INTRAVENOUS at 19:41

## 2024-01-17 RX ADMIN — GUAIFENESIN 600 MG: 600 TABLET ORAL at 19:40

## 2024-01-17 RX ADMIN — SODIUM CHLORIDE, PRESERVATIVE FREE 10 ML: 5 INJECTION INTRAVENOUS at 07:57

## 2024-01-17 RX ADMIN — INSULIN GLARGINE 12 UNITS: 100 INJECTION, SOLUTION SUBCUTANEOUS at 19:40

## 2024-01-17 RX ADMIN — INSULIN LISPRO 3 UNITS: 100 INJECTION, SOLUTION INTRAVENOUS; SUBCUTANEOUS at 18:16

## 2024-01-17 RX ADMIN — SERTRALINE HYDROCHLORIDE 25 MG: 25 TABLET ORAL at 10:08

## 2024-01-17 RX ADMIN — RIVAROXABAN 20 MG: 20 TABLET, FILM COATED ORAL at 18:15

## 2024-01-17 RX ADMIN — FUROSEMIDE 20 MG: 10 INJECTION, SOLUTION INTRAMUSCULAR; INTRAVENOUS at 07:51

## 2024-01-17 RX ADMIN — GUAIFENESIN 600 MG: 600 TABLET ORAL at 10:08

## 2024-01-17 RX ADMIN — INSULIN LISPRO 3 UNITS: 100 INJECTION, SOLUTION INTRAVENOUS; SUBCUTANEOUS at 10:07

## 2024-01-17 RX ADMIN — INSULIN LISPRO 3 UNITS: 100 INJECTION, SOLUTION INTRAVENOUS; SUBCUTANEOUS at 13:58

## 2024-01-17 RX ADMIN — FUROSEMIDE 20 MG: 10 INJECTION, SOLUTION INTRAMUSCULAR; INTRAVENOUS at 08:08

## 2024-01-17 ASSESSMENT — PAIN SCALES - GENERAL
PAINLEVEL_OUTOF10: 0

## 2024-01-17 NOTE — PLAN OF CARE
Problem: Confusion  Goal: Confusion, delirium, dementia, or psychosis is improved or at baseline  Description: INTERVENTIONS:  1. Assess for possible contributors to thought disturbance, including medications, impaired vision or hearing, underlying metabolic abnormalities, dehydration, psychiatric diagnoses, and notify attending LIP  2. [place] high risk fall precautions, as indicated  3. Provide frequent short contacts to provide reality reorientation, refocusing and direction  4. Decrease environmental stimuli, including noise as appropriate  5. Monitor and intervene to maintain adequate nutrition, hydration, elimination, sleep and activity  6. If unable to ensure safety without constant attention obtain sitter and review sitter guidelines with assigned personnel  7. Initiate Psychosocial CNS and Spiritual Care consult, as indicated  Outcome: Not Progressing  Flowsheets (Taken 1/17/2024 0244)  Effect of thought disturbance (confusion, delirium, dementia, or psychosis) are managed with adequate functional status:   Provide frequent short contacts to provide reality reorientation, refocusing and direction   [place] high risk fall precautions, as indicated   Assess for contributors to thought disturbance, including medications, impaired vision or hearing, underlying metabolic abnormalities, dehydration, psychiatric diagnoses, notify LIP   Decrease environmental stimuli, including noise as appropriate  Note: Pt A/O x1. Following commands.     Problem: Respiratory - Adult  Goal: Achieves optimal ventilation and oxygenation  1/17/2024 0244 by Estee Schaffer, RN  Outcome: Progressing  Flowsheets (Taken 1/17/2024 0244)  Achieves optimal ventilation and oxygenation:   Assess for changes in respiratory status   Assess for changes in mentation and behavior  Note: Pt weaned down to 5L HFNC. SpO2 >92%. Continuing to wean as tolerated.     Problem: Cardiovascular - Adult  Goal: Maintains optimal cardiac output and

## 2024-01-17 NOTE — PROGRESS NOTES
Pulmonary & Critical Care Medicine    Admit Date: 2024  PCP: Mika Orellana MD    CC:  hypoxia  Events of Last 24 hours:   Oxygen requirement is better.  At this time he is down to 5 liters with sats in upper 90s.    There is no cough.   No fever or chills.        Vitals:  Tmax:  VITALS:  BP (!) 87/70   Pulse 88   Temp 97.9 °F (36.6 °C) (Oral)   Resp 18   Ht 1.803 m (5' 11\")   Wt 85.4 kg (188 lb 4.4 oz)   SpO2 99%   BMI 26.26 kg/m²   24HR INTAKE/OUTPUT:    Intake/Output Summary (Last 24 hours) at 2024 1703  Last data filed at 2024 1130  Gross per 24 hour   Intake 130 ml   Output 490 ml   Net -360 ml     CURRENT PULSE OXIMETRY:  SpO2: 99 %  24HR PULSE OXIMETRY RANGE:  SpO2  Av.3 %  Min: 73 %  Max: 99 %    EXAM:  General: No distress. Alert  Eyes: PERRL. No sclera icterus. No conjunctival injection.  ENT: No discharge. Moist oral mucosa   Neck: Trachea midline. Neck is supple   Resp: No accessory muscle use. Bilateral rales.    CV: Regular rate. Regular rhythm. No mumur or rub. No edema.   GI: Non-tender. Non-distended.  Normal bowel sounds.    Neuro: Awake. Speech is clear.    Psych: No anxiety or agitation.     Medications:    IV:   sodium chloride      dextrose           Scheduled Meds:   [START ON 2024] furosemide  40 mg IntraVENous Daily    guaiFENesin  600 mg Oral BID    insulin lispro  3 Units SubCUTAneous TID WC    sertraline  25 mg Oral Daily    insulin glargine  12 Units SubCUTAneous Nightly    rivaroxaban  20 mg Oral Daily    sodium chloride flush  5-40 mL IntraVENous 2 times per day    insulin lispro  0-8 Units SubCUTAneous TID WC    insulin lispro  0-4 Units SubCUTAneous Nightly         Diet: ADULT ORAL NUTRITION SUPPLEMENT; Breakfast, Dinner; Diabetic Oral Supplement  ADULT DIET; Dysphagia - Soft and Bite Sized; 4 carb choices (60 gm/meal)     Results:  CBC: No results for input(s): \"WBC\", \"HGB\", \"HCT\", \"MCV\", \"PLT\" in the last 72 hours.  BMP:   Recent Labs

## 2024-01-17 NOTE — PLAN OF CARE
Problem: Respiratory - Adult  Goal: Achieves optimal ventilation and oxygenation  Flowsheets (Taken 1/15/2024 1425)  Achieves optimal ventilation and oxygenation:   Assess for changes in mentation and behavior   Assess for changes in respiratory status   Position to facilitate oxygenation and minimize respiratory effort   Respiratory therapy support as indicated   Assess and instruct to report shortness of breath or any respiratory difficulty   Oxygen supplementation based on oxygen saturation or arterial blood gases     Problem: Cardiovascular - Adult  Goal: Maintains optimal cardiac output and hemodynamic stability  Flowsheets (Taken 1/16/2024 1901)  Maintains optimal cardiac output and hemodynamic stability:   Monitor blood pressure and heart rate   Assess for signs of decreased cardiac output   Administer fluid and/or volume expanders as ordered  Goal: Absence of cardiac dysrhythmias or at baseline  Flowsheets (Taken 1/16/2024 1901)  Absence of cardiac dysrhythmias or at baseline:   Monitor cardiac rate and rhythm   Assess for signs of decreased cardiac output     Problem: Metabolic/Fluid and Electrolytes - Adult  Goal: Electrolytes maintained within normal limits  Flowsheets (Taken 1/16/2024 1901)  Electrolytes maintained within normal limits:   Administer electrolyte replacement as ordered   Monitor labs and assess patient for signs and symptoms of electrolyte imbalances   Monitor response to electrolyte replacements, including repeat lab results as appropriate     Problem: Safety - Adult  Goal: Free from fall injury  Flowsheets (Taken 1/16/2024 1901)  Free From Fall Injury: Instruct family/caregiver on patient safety     Problem: Pain  Goal: Verbalizes/displays adequate comfort level or baseline comfort level  Flowsheets (Taken 1/16/2024 1901)  Verbalizes/displays adequate comfort level or baseline comfort level:   Encourage patient to monitor pain and request assistance   Assess pain using appropriate pain

## 2024-01-17 NOTE — PROGRESS NOTES
RT called to bedside by RN for low SpO2. Pt was on 8LPM and 84%. RT turned pt up to 10LPM and SpO2 remained the same. PT currently on 15LPM and 91-93%. Pt noted to have low urine output by RN and lasix given. PT has fine crackles throughout.

## 2024-01-17 NOTE — PROGRESS NOTES
Comprehensive Nutrition Assessment    RECOMMENDATIONS:  PO Diet: Continue dysphagia - soft and bite sized; 4CC diet  ONS: Continue Glucerna BID  Nutrition Education: Education not indicated     NUTRITION ASSESSMENT:   Nutritional summary & status: Pt remains in droplet plus isolation, unable to assess in person. Pt on a dysphagia - soft and bite sized; carb controlled diet w/ Glucerna BID. Intakes between %, w/ ONS intakes >50%. Pt has been accepted to facility for d/c, however d/t increased O2 requirements she is unstable for d/c at this time.   Admission // PMH: COVID//HF, T2DM, acute hypoxic respiratory failure    MALNUTRITION ASSESSMENT  Context of Malnutrition: Chronic Illness   Malnutrition Status: Insufficient data  Findings of the 6 clinical characteristics of malnutrition (Minimum of 2 out of 6 clinical characteristics is required to make the diagnosis of moderate or severe Protein Calorie Malnutrition based on AND/ASPEN Guidelines):  Energy Intake:  No significant decrease in energy intake (While in admission)  Weight Loss:  Greater than 7.5% over 3 months     Body Fat Loss:  Unable to assess     Muscle Mass Loss:  Unable to assess    Fluid Accumulation:  No significant fluid accumulation      NUTRITION DIAGNOSIS   Inadequate protein-energy intake related to acute injury/trauma as evidenced by weight loss    Nutrition Monitoring and Evaluation:   Food/Nutrient Intake Outcomes:  Food and Nutrient Intake, Supplement Intake  Physical Signs/Symptoms Outcomes:  Biochemical Data, Nutrition Focused Physical Findings, Weight, Hemodynamic Status     OBJECTIVE DATA: Significant to nutrition assessment  Nutrition Related Findings: Na 133. . Active bowel sounds. +BM 1/13. -6.9L.  DAVIS trace. 15L O2.  Wounds: None  Nutrition Goals: Meet at least 75% of estimated needs     CURRENT NUTRITION THERAPIES  ADULT ORAL NUTRITION SUPPLEMENT; Breakfast, Dinner; Diabetic Oral Supplement  ADULT DIET; Dysphagia - Soft

## 2024-01-17 NOTE — PROGRESS NOTES
1/9/2024 8:49 AM: Comparison study is dated 12/12/2023. Implanted venous port is unchanged in position. Cardiomediastinal silhouette appears normal in size and configuration. Pulmonary vascularity appears within normal limits. Subtle opacities are visualized at the left lung base that may represent regional peribronchial thickening. Previously noted right basilar opacities have improved. No evidence of pleural effusion. Bony thorax appears unchanged.     1. Residual opacities at left lung base favoring peribronchial thickening. Etiologies would include reactive airway disease, bronchitis, viral syndrome. 2. Improved appearance of opacities at right lung base.       CBC:   No results for input(s): \"WBC\", \"HGB\", \"PLT\" in the last 72 hours.    BMP:    Recent Labs     01/15/24  0839 01/16/24  1212 01/17/24  0443   * 133* 133*   K 4.7 3.9 3.8   CL 98* 95* 95*   CO2 23 27 25   BUN 33* 32* 33*   CREATININE 1.0 1.2 1.1   GLUCOSE 189* 133* 89     Hepatic:   Recent Labs     01/15/24  0624 01/15/24  0839   AST 40* 25   ALT 11 10   BILITOT 0.3 0.3   ALKPHOS 81 83     Lipids:   Lab Results   Component Value Date/Time    CHOL 177 12/04/2023 05:51 AM    HDL 27 12/04/2023 05:51 AM    TRIG 112 12/04/2023 05:51 AM     Hemoglobin A1C:   Lab Results   Component Value Date/Time    LABA1C 9.1 12/13/2023 02:07 AM     TSH:   Lab Results   Component Value Date/Time    TSH 0.84 01/16/2024 12:12 PM     Troponin: No results found for: \"TROPONINT\"  Lactic Acid:   No results for input(s): \"LACTA\" in the last 72 hours.    BNP: No results for input(s): \"PROBNP\" in the last 72 hours.  UA:  Lab Results   Component Value Date/Time    NITRU Negative 12/13/2023 02:07 AM    COLORU Yellow 12/13/2023 02:07 AM    PHUR 5.5 12/13/2023 02:07 AM    WBCUA 0-2 12/13/2023 02:07 AM    RBCUA  12/13/2023 02:07 AM    MUCUS Rare 11/12/2023 05:56 PM    BACTERIA 2+ 12/13/2023 02:07 AM    CLARITYU Clear 12/13/2023 02:07 AM    SPECGRAV >=1.030 12/13/2023 02:07  AM    LEUKOCYTESUR Negative 12/13/2023 02:07 AM    UROBILINOGEN 0.2 12/13/2023 02:07 AM    BILIRUBINUR SMALL 12/13/2023 02:07 AM    BLOODU LARGE 12/13/2023 02:07 AM    GLUCOSEU Negative 12/13/2023 02:07 AM    KETUA Negative 12/13/2023 02:07 AM    AMORPHOUS 1+ 12/13/2023 02:07 AM     Urine Cultures:   Lab Results   Component Value Date/Time    LABURIN No growth at 18 to 36 hours 08/19/2020 03:55 AM     Blood Cultures:   Lab Results   Component Value Date/Time    BC No Growth after 4 days of incubation. 01/11/2024 01:54 PM     Lab Results   Component Value Date/Time    BLOODCULT2 No Growth after 4 days of incubation. 01/11/2024 01:54 PM     Organism:   Lab Results   Component Value Date/Time    ORG Staphylococcus coagulase negative DNA Detected 12/13/2023 02:10 AM    ORG Staphylococcus coagulase-negative 12/13/2023 02:10 AM         Electronically signed by Fernando Johnston MD on 1/17/2024 at 11:39 AM

## 2024-01-17 NOTE — CARE COORDINATION
ALBERTA spoke with dgtr, Gissell, via phone regarding DCP- states pt is no where near medically ready to DC d/t respiratory distress this morning. [facility] do not have any private rooms available at this time, and would also require them to have private duty aide services set up prior to admission- states this is not an option due to cost. ALBERTA informed Gissell that pt has been approved for SNF admission to [place], pending pt remains stable at 6L O2, or less. Gissell states this could be an acceptable option, ALBERTA encouraged her to visit facility for a tour.     Thank you  Cat Mark RN, BSN, ALBERTA  PCU   [phone]

## 2024-01-17 NOTE — PLAN OF CARE
Problem: Confusion  Goal: Confusion, delirium, dementia, or psychosis is improved or at baseline  Description: INTERVENTIONS:  1. Assess for possible contributors to thought disturbance, including medications, impaired vision or hearing, underlying metabolic abnormalities, dehydration, psychiatric diagnoses, and notify attending LIP  2. [place] high risk fall precautions, as indicated  3. Provide frequent short contacts to provide reality reorientation, refocusing and direction  4. Decrease environmental stimuli, including noise as appropriate  5. Monitor and intervene to maintain adequate nutrition, hydration, elimination, sleep and activity  6. If unable to ensure safety without constant attention obtain sitter and review sitter guidelines with assigned personnel  7. Initiate Psychosocial CNS and Spiritual Care consult, as indicated  1/17/2024 1128 by Adelia Washington, RN  Outcome: Progressing  1/17/2024 0244 by Estee Schaffer, RN  Outcome: Not Progressing  Flowsheets (Taken 1/17/2024 0244)  Effect of thought disturbance (confusion, delirium, dementia, or psychosis) are managed with adequate functional status:   Provide frequent short contacts to provide reality reorientation, refocusing and direction   [place] high risk fall precautions, as indicated   Assess for contributors to thought disturbance, including medications, impaired vision or hearing, underlying metabolic abnormalities, dehydration, psychiatric diagnoses, notify LIP   Decrease environmental stimuli, including noise as appropriate  Note: Pt A/O x1. Following commands.

## 2024-01-18 PROBLEM — I50.33 ACUTE ON CHRONIC DIASTOLIC HEART FAILURE (HCC): Status: ACTIVE | Noted: 2024-01-18

## 2024-01-18 PROBLEM — J96.21 ACUTE ON CHRONIC RESPIRATORY FAILURE WITH HYPOXEMIA (HCC): Status: ACTIVE | Noted: 2023-11-12

## 2024-01-18 LAB
ALBUMIN SERPL-MCNC: 3 G/DL (ref 3.4–5)
ANION GAP SERPL CALCULATED.3IONS-SCNC: 11 MMOL/L (ref 3–16)
BUN SERPL-MCNC: 40 MG/DL (ref 7–20)
CALCIUM SERPL-MCNC: 8.7 MG/DL (ref 8.3–10.6)
CHLORIDE SERPL-SCNC: 95 MMOL/L (ref 99–110)
CO2 SERPL-SCNC: 28 MMOL/L (ref 21–32)
CREAT SERPL-MCNC: 1.2 MG/DL (ref 0.8–1.3)
GFR SERPLBLD CREATININE-BSD FMLA CKD-EPI: 60 ML/MIN/{1.73_M2}
GLUCOSE BLD-MCNC: 115 MG/DL (ref 70–99)
GLUCOSE BLD-MCNC: 134 MG/DL (ref 70–99)
GLUCOSE BLD-MCNC: 157 MG/DL (ref 70–99)
GLUCOSE BLD-MCNC: 162 MG/DL (ref 70–99)
GLUCOSE SERPL-MCNC: 118 MG/DL (ref 70–99)
PERFORMED ON: ABNORMAL
PHOSPHATE SERPL-MCNC: 2.9 MG/DL (ref 2.5–4.9)
POTASSIUM SERPL-SCNC: 3.8 MMOL/L (ref 3.5–5.1)
SODIUM SERPL-SCNC: 134 MMOL/L (ref 136–145)

## 2024-01-18 PROCEDURE — 94761 N-INVAS EAR/PLS OXIMETRY MLT: CPT

## 2024-01-18 PROCEDURE — 6370000000 HC RX 637 (ALT 250 FOR IP): Performed by: INTERNAL MEDICINE

## 2024-01-18 PROCEDURE — 2060000000 HC ICU INTERMEDIATE R&B

## 2024-01-18 PROCEDURE — 2700000000 HC OXYGEN THERAPY PER DAY

## 2024-01-18 PROCEDURE — 6370000000 HC RX 637 (ALT 250 FOR IP)

## 2024-01-18 PROCEDURE — 2580000003 HC RX 258

## 2024-01-18 PROCEDURE — 97530 THERAPEUTIC ACTIVITIES: CPT

## 2024-01-18 PROCEDURE — 36415 COLL VENOUS BLD VENIPUNCTURE: CPT

## 2024-01-18 PROCEDURE — 80069 RENAL FUNCTION PANEL: CPT

## 2024-01-18 RX ORDER — TORSEMIDE 20 MG/1
20 TABLET ORAL DAILY
Status: DISCONTINUED | OUTPATIENT
Start: 2024-01-18 | End: 2024-01-19 | Stop reason: HOSPADM

## 2024-01-18 RX ORDER — OLANZAPINE 2.5 MG/1
2.5 TABLET, FILM COATED ORAL NIGHTLY PRN
Qty: 30 TABLET | Refills: 3 | DISCHARGE
Start: 2024-01-18

## 2024-01-18 RX ORDER — IPRATROPIUM BROMIDE AND ALBUTEROL SULFATE 2.5; .5 MG/3ML; MG/3ML
3 SOLUTION RESPIRATORY (INHALATION) EVERY 4 HOURS PRN
Qty: 360 ML | Refills: 0 | DISCHARGE
Start: 2024-01-18

## 2024-01-18 RX ORDER — TORSEMIDE 20 MG/1
20 TABLET ORAL DAILY
Qty: 30 TABLET | Refills: 3 | DISCHARGE
Start: 2024-01-18

## 2024-01-18 RX ORDER — SERTRALINE HYDROCHLORIDE 25 MG/1
25 TABLET, FILM COATED ORAL DAILY
Qty: 30 TABLET | Refills: 3 | DISCHARGE
Start: 2024-01-18

## 2024-01-18 RX ADMIN — SERTRALINE HYDROCHLORIDE 25 MG: 25 TABLET ORAL at 09:07

## 2024-01-18 RX ADMIN — SODIUM CHLORIDE, PRESERVATIVE FREE 10 ML: 5 INJECTION INTRAVENOUS at 09:12

## 2024-01-18 RX ADMIN — INSULIN LISPRO 3 UNITS: 100 INJECTION, SOLUTION INTRAVENOUS; SUBCUTANEOUS at 12:55

## 2024-01-18 RX ADMIN — INSULIN GLARGINE 12 UNITS: 100 INJECTION, SOLUTION SUBCUTANEOUS at 21:27

## 2024-01-18 RX ADMIN — TORSEMIDE 20 MG: 20 TABLET ORAL at 09:07

## 2024-01-18 RX ADMIN — INSULIN LISPRO 3 UNITS: 100 INJECTION, SOLUTION INTRAVENOUS; SUBCUTANEOUS at 09:07

## 2024-01-18 RX ADMIN — GUAIFENESIN 600 MG: 600 TABLET ORAL at 09:07

## 2024-01-18 RX ADMIN — SODIUM CHLORIDE, PRESERVATIVE FREE 10 ML: 5 INJECTION INTRAVENOUS at 21:27

## 2024-01-18 RX ADMIN — GUAIFENESIN 600 MG: 600 TABLET ORAL at 21:27

## 2024-01-18 RX ADMIN — RIVAROXABAN 20 MG: 20 TABLET, FILM COATED ORAL at 17:48

## 2024-01-18 ASSESSMENT — PAIN SCALES - GENERAL: PAINLEVEL_OUTOF10: 0

## 2024-01-18 NOTE — PLAN OF CARE
Problem: Respiratory - Adult  Goal: Achieves optimal ventilation and oxygenation  Outcome: Progressing  Flowsheets (Taken 1/18/2024 0332)  Achieves optimal ventilation and oxygenation:   Assess for changes in respiratory status   Position to facilitate oxygenation and minimize respiratory effort   Respiratory therapy support as indicated  Note: Pt spontaneously desaturated multiple times overnight. Pt quickly recovered. SpO2 >92% on 4L HFNC.     Problem: Cardiovascular - Adult  Goal: Maintains optimal cardiac output and hemodynamic stability  Outcome: Progressing  Flowsheets (Taken 1/18/2024 0332)  Maintains optimal cardiac output and hemodynamic stability: Monitor blood pressure and heart rate  Note: VSS     Problem: Cardiovascular - Adult  Goal: Absence of cardiac dysrhythmias or at baseline  Outcome: Progressing  Flowsheets (Taken 1/18/2024 0332)  Absence of cardiac dysrhythmias or at baseline: Monitor cardiac rate and rhythm     Problem: Safety - Adult  Goal: Free from fall injury  Outcome: Progressing  Flowsheets (Taken 1/18/2024 0332)  Free From Fall Injury: Instruct family/caregiver on patient safety  Note: Pt high fall risk, all standard safety precautions in place.      Problem: Confusion  Goal: Confusion, delirium, dementia, or psychosis is improved or at baseline  Description: INTERVENTIONS:  1. Assess for possible contributors to thought disturbance, including medications, impaired vision or hearing, underlying metabolic abnormalities, dehydration, psychiatric diagnoses, and notify attending LIP  2. [place] high risk fall precautions, as indicated  3. Provide frequent short contacts to provide reality reorientation, refocusing and direction  4. Decrease environmental stimuli, including noise as appropriate  5. Monitor and intervene to maintain adequate nutrition, hydration, elimination, sleep and activity  6. If unable to ensure safety without constant attention obtain sitter and review sitter

## 2024-01-18 NOTE — PLAN OF CARE
Problem: Discharge Planning  Goal: Discharge to home or other facility with appropriate resources  Outcome: Progressing  Flowsheets (Taken 1/18/2024 1814)  Discharge to home or other facility with appropriate resources:   Identify barriers to discharge with patient and caregiver   Arrange for needed discharge resources and transportation as appropriate   Identify discharge learning needs (meds, wound care, etc)   Arrange for interpreters to assist at discharge as needed   Refer to discharge planning if patient needs post-hospital services based on physician order or complex needs related to functional status, cognitive ability or social support system     Problem: Respiratory - Adult  Goal: Achieves optimal ventilation and oxygenation  Outcome: Progressing  Flowsheets (Taken 1/18/2024 1814)  Achieves optimal ventilation and oxygenation:   Assess for changes in respiratory status   Assess for changes in mentation and behavior   Position to facilitate oxygenation and minimize respiratory effort   Oxygen supplementation based on oxygen saturation or arterial blood gases   Initiate smoking cessation protocol as indicated   Encourage broncho-pulmonary hygiene including cough, deep breathe, incentive spirometry   Respiratory therapy support as indicated   Assess and instruct to report shortness of breath or any respiratory difficulty   Assess the need for suctioning and aspirate as needed     Problem: Cardiovascular - Adult  Goal: Maintains optimal cardiac output and hemodynamic stability  Outcome: Progressing  Flowsheets (Taken 1/18/2024 1814)  Maintains optimal cardiac output and hemodynamic stability:   Administer vasoactive medications as ordered   Administer fluid and/or volume expanders as ordered   Assess for signs of decreased cardiac output   Monitor urine output and notify Licensed Independent Practitioner for values outside of normal range   Monitor blood pressure and heart rate     Problem: Confusion  Goal:

## 2024-01-18 NOTE — DISCHARGE SUMMARY
V2.0  Discharge Summary    Name:  Mike Patel /Age/Sex: 1940 (83 y.o. male)   Admit Date: 2024  Discharge Date: 24    MRN & CSN:  [id number] & [id number] Encounter Date and Time 24 9:38 AM EST    Attending:  Fernando Johnston MD Discharging Provider: Fernando Johnston MD       Hospital Course:     HPI: Mike Patel is a 83 y.o. 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     ED Course:  Oriented x 3, tachypneic  Vitals: BP of 132/91, HR of 118, Temp of afebrile, SpO2 of 93% on 15 L of oxygen  Physical Exam: Tachycardic, tachypneic, no wheezing, some basilar crackles  EKG: Normal sinus rhythm without any ischemic changes  Labs: Hypokalemia of 5.3, BUN/creatinine normal range, lactic acidosis of 3.4 without anion gap, proBNP elevated to 734 with baseline of 400, trops 51, CBC WNL, COVID-positive, VBG showed mild hypoxic and hypercapnic respiratory failure with pH of 7.3 and pCO2 of 52.4 later improved to 32.4  Imaging: CXR shows residual opacities at the left lung bases, improved appearance of opacities of right lung base.  Treatment: Dexamethasone     Summary of Clinical Encounters:  -2023: Hospital admission.  Presented with dizziness lightheadedness diagnosed with FAWAD due to overdiuresis, pulmonology consulted and hypoxia was attributed to COVID-related he was sent on oxygen on exertion.  Lasix 40 was stopped  3-2023: Hospital admission presented with shortness of breath, diagnosed with heart failure exacerbation and was treated with IV Lasix with dobutamine.  -2023: Presented with altered mentation, MRI brain done which was negative, diagnosed with COVID-pneumonia to be likely reason for metabolic encephalopathy     Significant Diagnostic Studies:  PFTs 2020: Normal to mildly  patent. There is diffuse patchy groundglass airspace opacities bilaterally most pronounced in the lung bases. No large pleural effusion noted. Respiratory motion artifact limits evaluation. PLEURA: No pleural effusions or significant pleural thickening. HEART / GREAT VESSELS: Heart size appears mildly enlarged. No pericardial effusion. Visualized great vessels are intact. ADENOPATHY: None. CHEST WALL / LOWER NECK: No significant abnormality. Right-sided Port-A-Cath with tip in the SVC. BONES: No destructive process.     1. No CT findings of pulmonary thromboembolism on the current exam. 2. Extensive diffuse bilateral patchy and groundglass airspace opacities most pronounced in the lung bases. 3. Mild cardiomegaly.     XR CHEST PORTABLE    Result Date: 1/11/2024  EXAM: XR CHEST PORTABLE INDICATION: Increased O2 Req, COMPARISON: 1/9/2024 FINDINGS: SUPPORT DEVICES: Right IJ approach port catheter with tip projecting at the SVC. HEART / MEDIASTINUM: Cardiac silhouette is within normal limits in size. LUNGS/PLEURA: Diffuse bilateral airspace disease. This appears slightly increased in the right lower lung . No evidence of pneumothorax. BONES / SOFT TISSUES: No acute osseous abnormality. OTHER: None.     1.  Diffuse bilateral airspace disease with slight interval worsening. Electronically signed by Rick Mathews      CBC: No results for input(s): \"WBC\", \"HGB\", \"PLT\" in the last 72 hours.  BMP:    Recent Labs     01/16/24  1212 01/17/24  0443 01/18/24  0619   * 133* 134*   K 3.9 3.8 3.8   CL 95* 95* 95*   CO2 27 25 28   BUN 32* 33* 40*   CREATININE 1.2 1.1 1.2   GLUCOSE 133* 89 118*     Hepatic: No results for input(s): \"AST\", \"ALT\", \"ALB\", \"BILITOT\", \"ALKPHOS\" in the last 72 hours.  Lipids:   Lab Results   Component Value Date/Time    CHOL 177 12/04/2023 05:51 AM    HDL 27 12/04/2023 05:51 AM    TRIG 112 12/04/2023 05:51 AM     Hemoglobin A1C:   Lab Results   Component Value Date/Time    LABA1C 9.1 12/13/2023 02:07

## 2024-01-18 NOTE — PROGRESS NOTES
Occupational Therapy  Facility/Department: [address]  Occupational Therapy [place]     Name: Mike Patel  : 1940  MRN: [id number]  Date of Service: 2024    Discharge Recommendations:  Subacute/Skilled Nursing Facility          Patient Diagnosis(es): The primary encounter diagnosis was Pneumonia due to COVID-19 virus. A diagnosis of Acute on chronic hypoxic respiratory failure (HCC) was also pertinent to this visit.  Past Medical History:  has a past medical history of Diastolic CHF (HCC) and Non Hodgkin's lymphoma (HCC).  Past Surgical History:  has a past surgical history that includes lymph node biopsy and IR PORT PLACEMENT < 5 YEARS.    Treatment Diagnosis: impaired ADLs, transfers      Assessment   Performance deficits / Impairments: Decreased functional mobility ;Decreased ADL status;Decreased strength;Decreased safe awareness;Decreased cognition;Decreased endurance;Decreased balance  Assessment: Pt with improved bed mobility this date, but with sitting EOB pt with increased respiratory distress, difficulty following commands for PLB. Pt required max assist to maintain static sitting, pt unable to tolerate further activity and returned to supine, Recommend ongoing treatment. D/C plan to SNF for continued therapies  Treatment Diagnosis: impaired ADLs, transfers  Prognosis: Fair  REQUIRES OT FOLLOW-UP: Yes  Activity Tolerance  Activity Tolerance: Treatment limited secondary to medical complications (free text)  Activity Tolerance Comments: Patient with global deconditioning, O2 requirement at baseline. Difficulty obtaining accurate SpO2 reading throughout session. Pt with SOB, unable to follow cues for PLB or deep breaths. tolerates sitting EOB and in recliner approx 5-10 minutes before return to supine in bed.        Plan   Occupational Therapy Plan  Times Per Week: 2-5  Times Per Day: Once a day  Current Treatment Recommendations: Strengthening, Balance training, Endurance training,

## 2024-01-18 NOTE — PROGRESS NOTES
Physical Therapy  Facility/Department: [facility]  Daily Treatment Note  NAME: Mike Patel  : 1940  MRN: [id number]    Date of Service: 2024    Discharge Recommendations:  Subacute/Skilled Nursing Facility   PT Equipment Recommendations  Equipment Needed:  (defer to next level of care)    Patient Diagnosis(es): The primary encounter diagnosis was Pneumonia due to COVID-19 virus. A diagnosis of Acute on chronic hypoxic respiratory failure (HCC) was also pertinent to this visit.    Assessment   Assessment: Patient shows improved bed mobility compared to previous PT session but limited in overall treatment due to drop in O2 saturation.  Patient on 6L O2 upon arrival and shows drop to 77% when sitting EOB.  Increased O2 to 8L with minimal improvement.  Patient requiring max assist to sit EOB to allow for decreased work of breathing in attempt to raise O2 saturation but minimal improvement noted (O2 primarily in low to mid 80's throughout session).  Patient given continous verbal cues for breathing technique as he appears to be breathing only through mouth; minimal improvement noted with verbal cues.  Patient assisted back to supine position and continued with low O2 saturation.  RN reported non-rebreather available as needed and this was placed on patient (15L).  He showed rebound to low 90's with return to supine and rest with NRB mask.  NRB removed once patient settled and he was able to maintain O2 saturation >90% on 6L.  RN aware.  Patient limited by poor cognition and unstable O2 saturation.  Continue to recommend skilled PT upon discharge.  Will follow while in acute care setting to maximize independence with mobility.  Activity Tolerance: Patient limited by endurance;Patient limited by fatigue;Treatment limited secondary to decreased cognition  Equipment Needed:  (defer to next level of care)     Plan    Physical Therapy Plan  General Plan:  (2-5)  Current Treatment Recommendations:

## 2024-01-18 NOTE — DISCHARGE INSTR - COC
Continuity of Care Form    Patient Name: Mike Moore   :  1940  MRN:  [id number]    Admit date:  2024  Discharge date:  24    Code Status Order: Limited   Advance Directives:     Admitting Physician:  Vonda Magaña MD  PCP: Mika Orellana MD    Discharging Nurse: BAHMAN Dunlap  Discharging Hospital Unit/Room#: 4462/4462-01  Discharging Unit Phone Number: [phone]    Emergency Contact:   Extended Emergency Contact Information  Primary Emergency Contact: ricco moore  Home Phone: [phone]  Relation: Spouse  Secondary Emergency Contact: oscar-mindy bui  Home Phone: [phone]  Relation: Child    Past Surgical History:  Past Surgical History:   Procedure Laterality Date    IR PORT PLACEMENT < 5 YEARS      LYMPH NODE BIOPSY         Immunization History:     There is no immunization history on file for this patient.    Active Problems:  Patient Active Problem List   Diagnosis Code    Diffuse large B cell lymphoma ([facility]) C83.30    DVT, lower extremity, distal, acute, left ([facility]) I82.4Z2    Acute on chronic respiratory failure with hypoxemia ([facility]) J96.21    Aphasia R47.01    Delirium R41.0    Acute heart failure with preserved ejection fraction (HFpEF) ([facility]) I50.31    Hypoxia R09.02    Acute kidney injury ([facility]) N17.9    COVID-19 U07.1    Acute on chronic diastolic heart failure ([facility]) I50.33       Isolation/Infection:   Isolation            Droplet Plus          Patient Infection Status       Infection Onset Added Last Indicated Last Indicated By Review Planned Expiration Resolved Resolved By    COVID-19 24 COVID-19 & Influenza Combo 24      Resolved    COVID-19 23 COVID-19, Rapid   23 Infection                        Nurse Assessment:  Last Vital Signs: BP 93/68   Pulse 76   Temp 97.4 °F (36.3 °C) (Oral)   Resp 14   Ht 1.803 m (5' 11\")   Wt 85.5 kg (188 lb 7.9 oz)   SpO2 97%   BMI 26.29 kg/m²     Last documented

## 2024-01-18 NOTE — CARE COORDINATION
CM attempted to call Gissell regarding DC order and DCP. No answer,  left requesting call back. Family has not given CM any further SNF options- only feasible option at this time is [facility], who has already accepted pt.     1105:  ALBERTA LVM with Rossana in admissions to try and reach out to dgtr, Gissell. CM will f/u shortly.    1415:  CM received call back from daughter, Gissell, stating that they are going to appeal the discharge order, as they would like family to be with pt for the transfer to SNF (agreeable with DC to [facility]). States family can be here Saturday before 1200 to assist with transfer.     Gissell completed appeal process- case # [id number]. DND sent to Gissell's email.    Pt will require BLS transport d/t debility and O2 requirement. CM informed Rossana at [facility] of situation, states they can hold bed until Saturday morning.     MD made aware of appeal.    -------------------------    Patient/family has chosen to appeal discharge, IMM signed, Detailed Notice of Discharge delivered to patient/family and reviewed with patient/family. Patient/family aware they must initiate appeal process with Sally, number provided for contact.    IMM Letter  IMM Letter given to Patient/Family/Significant other/Guardian/POA/by:: Jessy Israel  IMM Letter date given:: 01/18/24  IMM Letter time given:: 1115    Sally Appeal Case #: [id number]         1530:  CM received VM from Gissell requesting call back. CM called back- went straight to , left VM with office hours and call back number.     1605:  CM received call back from Gissell, states family will actually be able to be at bedside tomorrow around 1230- agreeable to DC tomorrow at 1300 via EMS. Transport scheduled for 1/19 @ 1300 via [facility], Rossana at [facility] made aware.    Thank you  Cat Mark RUGGIERO, BSN, CM  PCU   [phone]

## 2024-01-19 VITALS
SYSTOLIC BLOOD PRESSURE: 97 MMHG | BODY MASS INDEX: 24.85 KG/M2 | WEIGHT: 177.47 LBS | TEMPERATURE: 97.6 F | RESPIRATION RATE: 18 BRPM | OXYGEN SATURATION: 95 % | DIASTOLIC BLOOD PRESSURE: 69 MMHG | HEIGHT: 71 IN | HEART RATE: 87 BPM

## 2024-01-19 LAB
GLUCOSE BLD-MCNC: 155 MG/DL (ref 70–99)
GLUCOSE BLD-MCNC: 199 MG/DL (ref 70–99)
PERFORMED ON: ABNORMAL
PERFORMED ON: ABNORMAL

## 2024-01-19 PROCEDURE — 6370000000 HC RX 637 (ALT 250 FOR IP)

## 2024-01-19 PROCEDURE — 6370000000 HC RX 637 (ALT 250 FOR IP): Performed by: INTERNAL MEDICINE

## 2024-01-19 PROCEDURE — 2580000003 HC RX 258

## 2024-01-19 RX ADMIN — INSULIN LISPRO 3 UNITS: 100 INJECTION, SOLUTION INTRAVENOUS; SUBCUTANEOUS at 12:31

## 2024-01-19 RX ADMIN — INSULIN LISPRO 3 UNITS: 100 INJECTION, SOLUTION INTRAVENOUS; SUBCUTANEOUS at 09:04

## 2024-01-19 RX ADMIN — TORSEMIDE 20 MG: 20 TABLET ORAL at 08:57

## 2024-01-19 RX ADMIN — SERTRALINE HYDROCHLORIDE 25 MG: 25 TABLET ORAL at 08:57

## 2024-01-19 RX ADMIN — GUAIFENESIN 600 MG: 600 TABLET ORAL at 08:57

## 2024-01-19 RX ADMIN — SODIUM CHLORIDE, PRESERVATIVE FREE 10 ML: 5 INJECTION INTRAVENOUS at 08:56

## 2024-01-19 NOTE — PROGRESS NOTES
Pt's daughter Gissell called to check on her dad. She said no one was able to visit today due to concerns snow. She informed me that she and her mom will be here tomorrow at noon to pick him up and transport him to [facility]. Per her request, I conveyed her love to her father.

## 2024-01-19 NOTE — PROGRESS NOTES
Patient discharged to [facility] via EMS. Patient report called to BAHMAN Iyer of [facility]. IV and telemetry removed prior to discharge. Patient's vital signs stable prior to discharge. Patient discharged on 3-4L nasal cannula of oxygen at baseline. Discharge instructions taken with patient at discharge. Family at bedside.

## 2024-01-19 NOTE — PLAN OF CARE
Problem: Discharge Planning  Goal: Discharge to home or other facility with appropriate resources  1/19/2024 0441 by Cammy Ceron RN  Outcome: Progressing  Flowsheets (Taken 1/18/2024 1814 by Megan Byers RN)  Discharge to home or other facility with appropriate resources:   Identify barriers to discharge with patient and caregiver   Arrange for needed discharge resources and transportation as appropriate   Identify discharge learning needs (meds, wound care, etc)   Arrange for interpreters to assist at discharge as needed   Refer to discharge planning if patient needs post-hospital services based on physician order or complex needs related to functional status, cognitive ability or social support system     Problem: Respiratory - Adult  Goal: Achieves optimal ventilation and oxygenation  1/19/2024 0441 by Cammy Ceron RN  Outcome: Progressing  Flowsheets (Taken 1/18/2024 1814 by Megan Byers RN)  Achieves optimal ventilation and oxygenation:   Assess for changes in respiratory status   Assess for changes in mentation and behavior   Position to facilitate oxygenation and minimize respiratory effort   Oxygen supplementation based on oxygen saturation or arterial blood gases   Initiate smoking cessation protocol as indicated   Encourage broncho-pulmonary hygiene including cough, deep breathe, incentive spirometry   Respiratory therapy support as indicated   Assess and instruct to report shortness of breath or any respiratory difficulty   Assess the need for suctioning and aspirate as needed     Problem: Cardiovascular - Adult  Goal: Maintains optimal cardiac output and hemodynamic stability  1/19/2024 0441 by Cammy Ceron RN  Outcome: Progressing  Flowsheets (Taken 1/18/2024 1814 by Megan Byers RN)  Maintains optimal cardiac output and hemodynamic stability:   Administer vasoactive medications as ordered   Administer fluid and/or volume expanders as ordered   Assess for signs of decreased cardiac  output   Monitor urine output and notify Licensed Independent Practitioner for values outside of normal range   Monitor blood pressure and heart rate     Problem: Cardiovascular - Adult  Goal: Absence of cardiac dysrhythmias or at baseline  Outcome: Progressing  Flowsheets (Taken 1/18/2024 0332 by Estee Schaffer, RN)  Absence of cardiac dysrhythmias or at baseline: Monitor cardiac rate and rhythm     Problem: Metabolic/Fluid and Electrolytes - Adult  Goal: Electrolytes maintained within normal limits  Outcome: Progressing  Flowsheets (Taken 1/16/2024 1901 by Sharon Herrera RN)  Electrolytes maintained within normal limits:   Administer electrolyte replacement as ordered   Monitor labs and assess patient for signs and symptoms of electrolyte imbalances   Monitor response to electrolyte replacements, including repeat lab results as appropriate     Problem: Safety - Adult  Goal: Free from fall injury  Outcome: Progressing  Flowsheets (Taken 1/18/2024 0332 by Estee Schaffer, RN)  Free From Fall Injury: Instruct family/caregiver on patient safety     Problem: Confusion  Goal: Confusion, delirium, dementia, or psychosis is improved or at baseline  Description: INTERVENTIONS:  1. Assess for possible contributors to thought disturbance, including medications, impaired vision or hearing, underlying metabolic abnormalities, dehydration, psychiatric diagnoses, and notify attending LIP  2. [place] high risk fall precautions, as indicated  3. Provide frequent short contacts to provide reality reorientation, refocusing and direction  4. Decrease environmental stimuli, including noise as appropriate  5. Monitor and intervene to maintain adequate nutrition, hydration, elimination, sleep and activity  6. If unable to ensure safety without constant attention obtain sitter and review sitter guidelines with assigned personnel  7. Initiate Psychosocial CNS and Spiritual Care consult, as indicated  1/19/2024 0441 by

## 2024-01-19 NOTE — CARE COORDINATION
Case Management Assessment            Discharge Note                    Date / Time of Note: 1/19/2024 9:11 AM                  Discharge Note Completed by: Dahiana Flores    Patient Name: Mike Patel   YOB: 1940  Diagnosis: Acute on chronic hypoxic respiratory failure (HCC) [J96.21]  Pneumonia due to COVID-19 virus [U07.1, J12.82]  COVID-19 [U07.1]   Date / Time: 1/9/2024  8:16 AM    Current PCP: Mika Orellana MD  Clinic patient: No    Hospitalization in the last 30 days: Yes  Readmission Assessment  Number of Days since last admission?: 8-30 days  Previous Disposition: SNF  Who is being Interviewed: Caregiver  What was the patient's/caregiver's perception as to why they think they needed to return back to the hospital?: Other (Comment) (covid)  Did you visit your Primary Care Physician after you left the hospital, before you returned this time?: No  Why weren't you able to visit your PCP?: Other (Comment) (at snf)  Did you see a specialist, such as Cardiac, Pulmonary, Orthopedic Physician, etc. after you left the hospital?: No  Who advised the patient to return to the hospital?: Skilled Unit  Does the patient report anything that got in the way of taking their medications?: No  In our efforts to provide the best possible care to you and others like you, can you think of anything that we could have done to help you after you left the hospital the first time, so that you might not have needed to return so soon?: Other (Comment) (none)    Advance Directives:  Code Status: Limited  [place] DNR form completed and on chart: Yes    Financial:  Payor: MEDICARE / Plan: MEDICARE PART A AND B / Product Type: *No Product type* /      Pharmacy:    [id number] - [place], [place] - [address] [phone] - F [phone]  [address]  [facility] [address]  Phone: [phone] Fax: [phone]      Assistance purchasing medications?: Potential Assistance  Purchasing Medications: No  Assistance provided by Case Management: None at this time    Does patient want to participate in local refill/ meds to beds program?: No    Meds To Beds General Rules:  1. Can ONLY be done Monday- Friday between 8:30am-5pm  2. Prescription(s) must be in pharmacy by 3pm to be filled same day  3.Copy of patient's insurance/ prescription drug card and patient face sheet must be sent along with the prescription(s)  4. Cost of Rx cannot be added to hospital bill. If financial assistance is needed, please contact unit  or ;  or  CANNOT provide pharmacy voucher for patients co-pays  5. Patients can then  the prescription on their way out of the hospital at discharge, or pharmacy can deliver to the bedside if staff is available. (payment due at time of pick-up or delivery - cash, check, or card accepted)     Able to afford home medications/ co-pay costs: Yes    ADLS:  Current PT AM-PAC Score: 11 /24  Current OT AM-PAC Score: 9 /24      DISCHARGE Disposition: Skilled Nursing Facility (SNF): [facility] Phone: [phone] Fax: [phone]    LOC at discharge: Skilled  RILEY Completed: Yes    Notification completed in HENS/PAS?:  Yes : CM has completed HENS online through secure website for SNF admission at [facility].   Document ID #: [id number]    IMM Completed:   Yes, Case management has presented and reviewed IMM letter #2 to the patient and/or family/ POA. Patient and/or family/POA verbalized understanding of their medicare rights and appeal process if needed. Patient and/or family/POA has signed and placed today's date (1/18/2024) and time (1115) on letter #2 on the the appropriate lines. Patient and/or family/POA, provided copy of signed letter and they are aware that this original copy of IMM letter #2 is available prior to discharge from the paper chart on the unit.  Electronic documentation has been entered into epic for IMM letter

## 2024-01-19 NOTE — RT PROTOCOL NOTE
RT Nebulizer Bronchodilator Protocol Note    There is a bronchodilator order in the chart from a provider indicating to follow the RT Bronchodilator Protocol and there is an “Initiate RT Bronchodilator Protocol” order as well (see protocol at bottom of note).    CXR Findings:  No results found.    The findings from the last RT Protocol Assessment were as follows:  Smoking: None or smoker <15 pack years  Respiratory Pattern: Regular pattern and RR 12-20 bpm  Breath Sounds: Slightly diminished and/or crackles  Cough: Strong, spontaneous, non-productive  Indication for Bronchodilator Therapy: Decreased or absent breath sounds  Bronchodilator Assessment Score: 2    Aerosolized bronchodilator medication orders have been revised according to the RT Nebulizer Bronchodilator Protocol below.    Respiratory Therapist to perform RT Therapy Protocol Assessment initially then follow the protocol.  Repeat RT Therapy Protocol Assessment PRN for score 0-3 or on second treatment, BID, and PRN for scores above 3.    No Indications - adjust the frequency to every 6 hours PRN wheezing or bronchospasm, if no treatments needed after 48 hours then discontinue using Per Protocol order mode.     If indication present, adjust the RT bronchodilator orders based on the Bronchodilator Assessment Score as indicated below.  If a patient is on this medication at home then do not decrease Frequency below that used at home.    0-3 - enter or revise RT bronchodilator order(s) to equivalent RT Bronchodilator order with Frequency of every 4 hours PRN for wheezing or increased work of breathing using Per Protocol order mode.       4-6 - enter or revise RT Bronchodilator order(s) to two equivalent RT bronchodilator orders with one order with BID Frequency and one order with Frequency of every 4 hours PRN wheezing or increased work of breathing using Per Protocol order mode.         7-10 - enter or revise RT Bronchodilator order(s) to two equivalent RT  bronchodilator orders with one order with TID Frequency and one order with Frequency of every 4 hours PRN wheezing or increased work of breathing using Per Protocol order mode.       11-13 - enter or revise RT Bronchodilator order(s) to one equivalent RT bronchodilator order with QID Frequency and an Albuterol order with Frequency of every 4 hours PRN wheezing or increased work of breathing using Per Protocol order mode.      Greater than 13 - enter or revise RT Bronchodilator order(s) to one equivalent RT bronchodilator order with every 4 hours Frequency and an Albuterol order with Frequency of every 2 hours PRN wheezing or increased work of breathing using Per Protocol order mode.     RT to enter RT Home Evaluation for COPD & MDI Assessment order using Per Protocol order mode.    Electronically signed by Enma Matthews RCP on 1/19/2024 at 8:09 AM

## 2024-01-19 NOTE — PLAN OF CARE
Problem: Discharge Planning  Goal: Discharge to home or other facility with appropriate resources  1/19/2024 1310 by Fabi Parada, RN  Outcome: Progressing  Flowsheets (Taken 1/19/2024 1300)  Discharge to home or other facility with appropriate resources:   Identify barriers to discharge with patient and caregiver   Arrange for needed discharge resources and transportation as appropriate   Identify discharge learning needs (meds, wound care, etc)   Arrange for interpreters to assist at discharge as needed   Refer to discharge planning if patient needs post-hospital services based on physician order or complex needs related to functional status, cognitive ability or social support system  Note: Patient discharged to [facility].      Problem: Respiratory - Adult  Goal: Achieves optimal ventilation and oxygenation  1/19/2024 1310 by Fabi Parada, RN  Outcome: Progressing  Flowsheets (Taken 1/19/2024 1310)  Achieves optimal ventilation and oxygenation:   Assess for changes in respiratory status   Assess for changes in mentation and behavior   Position to facilitate oxygenation and minimize respiratory effort   Oxygen supplementation based on oxygen saturation or arterial blood gases   Encourage broncho-pulmonary hygiene including cough, deep breathe, incentive spirometry   Assess and instruct to report shortness of breath or any respiratory difficulty   Respiratory therapy support as indicated   Assess the need for suctioning and aspirate as needed  Note: Patient on baseline oxygen 3-4L nasal cannula SpO2 92-97%.      Problem: Cardiovascular - Adult  Goal: Maintains optimal cardiac output and hemodynamic stability  1/19/2024 1310 by Fabi Parada, RN  Outcome: Progressing  Flowsheets (Taken 1/19/2024 1300)  Maintains optimal cardiac output and hemodynamic stability:   Monitor blood pressure and heart rate   Monitor urine output and notify Licensed Independent Practitioner for values outside of normal range    improved or at baseline  Description: INTERVENTIONS:  1. Assess for possible contributors to thought disturbance, including medications, impaired vision or hearing, underlying metabolic abnormalities, dehydration, psychiatric diagnoses, and notify attending LIP  2. [place] high risk fall precautions, as indicated  3. Provide frequent short contacts to provide reality reorientation, refocusing and direction  4. Decrease environmental stimuli, including noise as appropriate  5. Monitor and intervene to maintain adequate nutrition, hydration, elimination, sleep and activity  6. If unable to ensure safety without constant attention obtain sitter and review sitter guidelines with assigned personnel  7. Initiate Psychosocial CNS and Spiritual Care consult, as indicated  1/19/2024 1310 by Fabi Parada, RN  Outcome: Progressing  Flowsheets (Taken 1/19/2024 1300)  Effect of thought disturbance (confusion, delirium, dementia, or psychosis) are managed with adequate functional status:   Assess for contributors to thought disturbance, including medications, impaired vision or hearing, underlying metabolic abnormalities, dehydration, psychiatric diagnoses, notify LIP   [place] high risk fall precautions, as indicated   Provide frequent short contacts to provide reality reorientation, refocusing and direction   Decrease environmental stimuli, including noise as appropriate   Monitor and intervene to maintain adequate nutrition, hydration, elimination, sleep and activity   If unable to ensure safety without constant attention obtain sitter and review sitter guidelines with assigned personnel     Problem: Skin/Tissue Integrity  Goal: Absence of new skin breakdown  Description: 1.  Monitor for areas of redness and/or skin breakdown  2.  Assess vascular access sites hourly  3.  Every 4-6 hours minimum:  Change oxygen saturation probe site  4.  Every 4-6 hours:  If on nasal continuous positive airway pressure, respiratory